# Patient Record
Sex: FEMALE | Race: WHITE | NOT HISPANIC OR LATINO | Employment: OTHER | URBAN - METROPOLITAN AREA
[De-identification: names, ages, dates, MRNs, and addresses within clinical notes are randomized per-mention and may not be internally consistent; named-entity substitution may affect disease eponyms.]

---

## 2017-10-02 ENCOUNTER — TRANSFERRED RECORDS (OUTPATIENT)
Dept: HEALTH INFORMATION MANAGEMENT | Facility: CLINIC | Age: 67
End: 2017-10-02

## 2017-10-05 ENCOUNTER — TRANSFERRED RECORDS (OUTPATIENT)
Dept: HEALTH INFORMATION MANAGEMENT | Facility: CLINIC | Age: 67
End: 2017-10-05

## 2017-10-11 RX ORDER — CLINDAMYCIN HCL 300 MG
600 CAPSULE ORAL DAILY PRN
COMMUNITY
End: 2022-05-19

## 2017-10-11 RX ORDER — LORAZEPAM 1 MG/1
1 TABLET ORAL AT BEDTIME
Status: ON HOLD | COMMUNITY
End: 2019-08-10

## 2017-10-13 ENCOUNTER — TRANSFERRED RECORDS (OUTPATIENT)
Dept: HEALTH INFORMATION MANAGEMENT | Facility: CLINIC | Age: 67
End: 2017-10-13

## 2017-10-15 ENCOUNTER — ANESTHESIA EVENT (OUTPATIENT)
Dept: SURGERY | Facility: CLINIC | Age: 67
End: 2017-10-15
Payer: MEDICARE

## 2017-10-16 ENCOUNTER — APPOINTMENT (OUTPATIENT)
Dept: GENERAL RADIOLOGY | Facility: CLINIC | Age: 67
End: 2017-10-16
Attending: ORTHOPAEDIC SURGERY
Payer: MEDICARE

## 2017-10-16 ENCOUNTER — HOSPITAL ENCOUNTER (OUTPATIENT)
Facility: CLINIC | Age: 67
Discharge: HOME | End: 2017-10-18
Attending: ORTHOPAEDIC SURGERY | Admitting: ORTHOPAEDIC SURGERY
Payer: MEDICARE

## 2017-10-16 ENCOUNTER — ANESTHESIA (OUTPATIENT)
Dept: SURGERY | Facility: CLINIC | Age: 67
End: 2017-10-16
Payer: MEDICARE

## 2017-10-16 DIAGNOSIS — Z98.890 S/P LAMINECTOMY: Primary | ICD-10-CM

## 2017-10-16 PROBLEM — M48.00 SPINAL STENOSIS: Status: ACTIVE | Noted: 2017-10-16

## 2017-10-16 LAB — HGB BLD-MCNC: 14.4 G/DL (ref 11.7–15.7)

## 2017-10-16 PROCEDURE — 25000128 H RX IP 250 OP 636: Performed by: ANESTHESIOLOGY

## 2017-10-16 PROCEDURE — 25000128 H RX IP 250 OP 636: Performed by: NURSE ANESTHETIST, CERTIFIED REGISTERED

## 2017-10-16 PROCEDURE — 25000125 ZZHC RX 250: Performed by: NURSE ANESTHETIST, CERTIFIED REGISTERED

## 2017-10-16 PROCEDURE — 36415 COLL VENOUS BLD VENIPUNCTURE: CPT | Performed by: ANESTHESIOLOGY

## 2017-10-16 PROCEDURE — 37000008 ZZH ANESTHESIA TECHNICAL FEE, 1ST 30 MIN: Performed by: ORTHOPAEDIC SURGERY

## 2017-10-16 PROCEDURE — 27210794 ZZH OR GENERAL SUPPLY STERILE: Performed by: ORTHOPAEDIC SURGERY

## 2017-10-16 PROCEDURE — 25000128 H RX IP 250 OP 636: Performed by: PHYSICIAN ASSISTANT

## 2017-10-16 PROCEDURE — 36000063 ZZH SURGERY LEVEL 4 EA 15 ADDTL MIN: Performed by: ORTHOPAEDIC SURGERY

## 2017-10-16 PROCEDURE — 85018 HEMOGLOBIN: CPT | Performed by: ANESTHESIOLOGY

## 2017-10-16 PROCEDURE — 40000277 XR SURGERY CARM FLUORO LESS THAN 5 MIN W STILLS

## 2017-10-16 PROCEDURE — 37000009 ZZH ANESTHESIA TECHNICAL FEE, EACH ADDTL 15 MIN: Performed by: ORTHOPAEDIC SURGERY

## 2017-10-16 PROCEDURE — 25000125 ZZHC RX 250: Performed by: ORTHOPAEDIC SURGERY

## 2017-10-16 PROCEDURE — 25000566 ZZH SEVOFLURANE, EA 15 MIN: Performed by: ORTHOPAEDIC SURGERY

## 2017-10-16 PROCEDURE — A9270 NON-COVERED ITEM OR SERVICE: HCPCS | Mod: GY | Performed by: PHYSICIAN ASSISTANT

## 2017-10-16 PROCEDURE — 27210995 ZZH RX 272: Performed by: ORTHOPAEDIC SURGERY

## 2017-10-16 PROCEDURE — 36000065 ZZH SURGERY LEVEL 4 W FLUORO 1ST 30 MIN: Performed by: ORTHOPAEDIC SURGERY

## 2017-10-16 PROCEDURE — 25000128 H RX IP 250 OP 636: Performed by: ORTHOPAEDIC SURGERY

## 2017-10-16 PROCEDURE — 25000132 ZZH RX MED GY IP 250 OP 250 PS 637: Mod: GY | Performed by: PHYSICIAN ASSISTANT

## 2017-10-16 PROCEDURE — 71000012 ZZH RECOVERY PHASE 1 LEVEL 1 FIRST HR: Performed by: ORTHOPAEDIC SURGERY

## 2017-10-16 PROCEDURE — 40000170 ZZH STATISTIC PRE-PROCEDURE ASSESSMENT II: Performed by: ORTHOPAEDIC SURGERY

## 2017-10-16 RX ORDER — OXYCODONE HYDROCHLORIDE 5 MG/1
5-10 TABLET ORAL
Status: DISCONTINUED | OUTPATIENT
Start: 2017-10-16 | End: 2017-10-18 | Stop reason: HOSPADM

## 2017-10-16 RX ORDER — ONDANSETRON 4 MG/1
4 TABLET, ORALLY DISINTEGRATING ORAL EVERY 6 HOURS PRN
Status: DISCONTINUED | OUTPATIENT
Start: 2017-10-16 | End: 2017-10-18 | Stop reason: HOSPADM

## 2017-10-16 RX ORDER — LORAZEPAM 1 MG/1
1 TABLET ORAL AT BEDTIME
Status: DISCONTINUED | OUTPATIENT
Start: 2017-10-16 | End: 2017-10-18 | Stop reason: HOSPADM

## 2017-10-16 RX ORDER — ALBUTEROL SULFATE 0.83 MG/ML
2.5 SOLUTION RESPIRATORY (INHALATION) EVERY 4 HOURS PRN
Status: DISCONTINUED | OUTPATIENT
Start: 2017-10-16 | End: 2017-10-16 | Stop reason: HOSPADM

## 2017-10-16 RX ORDER — MEPERIDINE HYDROCHLORIDE 25 MG/ML
12.5 INJECTION INTRAMUSCULAR; INTRAVENOUS; SUBCUTANEOUS EVERY 5 MIN PRN
Status: DISCONTINUED | OUTPATIENT
Start: 2017-10-16 | End: 2017-10-16 | Stop reason: HOSPADM

## 2017-10-16 RX ORDER — ONDANSETRON 2 MG/ML
4 INJECTION INTRAMUSCULAR; INTRAVENOUS EVERY 6 HOURS PRN
Status: DISCONTINUED | OUTPATIENT
Start: 2017-10-16 | End: 2017-10-18 | Stop reason: HOSPADM

## 2017-10-16 RX ORDER — SODIUM CHLORIDE 9 MG/ML
INJECTION, SOLUTION INTRAVENOUS CONTINUOUS
Status: DISCONTINUED | OUTPATIENT
Start: 2017-10-16 | End: 2017-10-17 | Stop reason: CLARIF

## 2017-10-16 RX ORDER — CLINDAMYCIN PHOSPHATE 900 MG/50ML
900 INJECTION, SOLUTION INTRAVENOUS
Status: DISCONTINUED | OUTPATIENT
Start: 2017-10-16 | End: 2017-10-16 | Stop reason: HOSPADM

## 2017-10-16 RX ORDER — SODIUM CHLORIDE, SODIUM LACTATE, POTASSIUM CHLORIDE, CALCIUM CHLORIDE 600; 310; 30; 20 MG/100ML; MG/100ML; MG/100ML; MG/100ML
INJECTION, SOLUTION INTRAVENOUS CONTINUOUS PRN
Status: DISCONTINUED | OUTPATIENT
Start: 2017-10-16 | End: 2017-10-16

## 2017-10-16 RX ORDER — NEOSTIGMINE METHYLSULFATE 1 MG/ML
VIAL (ML) INJECTION PRN
Status: DISCONTINUED | OUTPATIENT
Start: 2017-10-16 | End: 2017-10-16

## 2017-10-16 RX ORDER — SODIUM CHLORIDE, SODIUM LACTATE, POTASSIUM CHLORIDE, CALCIUM CHLORIDE 600; 310; 30; 20 MG/100ML; MG/100ML; MG/100ML; MG/100ML
INJECTION, SOLUTION INTRAVENOUS CONTINUOUS
Status: DISCONTINUED | OUTPATIENT
Start: 2017-10-16 | End: 2017-10-16 | Stop reason: HOSPADM

## 2017-10-16 RX ORDER — HYDROMORPHONE HYDROCHLORIDE 1 MG/ML
.3-.5 INJECTION, SOLUTION INTRAMUSCULAR; INTRAVENOUS; SUBCUTANEOUS EVERY 30 MIN PRN
Status: DISCONTINUED | OUTPATIENT
Start: 2017-10-16 | End: 2017-10-18 | Stop reason: HOSPADM

## 2017-10-16 RX ORDER — GLYCOPYRROLATE 0.2 MG/ML
INJECTION, SOLUTION INTRAMUSCULAR; INTRAVENOUS PRN
Status: DISCONTINUED | OUTPATIENT
Start: 2017-10-16 | End: 2017-10-16

## 2017-10-16 RX ORDER — ACETAMINOPHEN 325 MG/1
650 TABLET ORAL EVERY 6 HOURS PRN
Status: DISCONTINUED | OUTPATIENT
Start: 2017-10-16 | End: 2017-10-18 | Stop reason: HOSPADM

## 2017-10-16 RX ORDER — HYDROMORPHONE HYDROCHLORIDE 1 MG/ML
.3-.5 INJECTION, SOLUTION INTRAMUSCULAR; INTRAVENOUS; SUBCUTANEOUS EVERY 5 MIN PRN
Status: DISCONTINUED | OUTPATIENT
Start: 2017-10-16 | End: 2017-10-16 | Stop reason: HOSPADM

## 2017-10-16 RX ORDER — CEFAZOLIN SODIUM 1 G/3ML
1 INJECTION, POWDER, FOR SOLUTION INTRAMUSCULAR; INTRAVENOUS SEE ADMIN INSTRUCTIONS
Status: DISCONTINUED | OUTPATIENT
Start: 2017-10-16 | End: 2017-10-16 | Stop reason: HOSPADM

## 2017-10-16 RX ORDER — CLINDAMYCIN PHOSPHATE 900 MG/50ML
900 INJECTION, SOLUTION INTRAVENOUS SEE ADMIN INSTRUCTIONS
Status: DISCONTINUED | OUTPATIENT
Start: 2017-10-16 | End: 2017-10-16 | Stop reason: HOSPADM

## 2017-10-16 RX ORDER — NALOXONE HYDROCHLORIDE 0.4 MG/ML
.1-.4 INJECTION, SOLUTION INTRAMUSCULAR; INTRAVENOUS; SUBCUTANEOUS
Status: DISCONTINUED | OUTPATIENT
Start: 2017-10-16 | End: 2017-10-18 | Stop reason: HOSPADM

## 2017-10-16 RX ORDER — FENTANYL CITRATE 50 UG/ML
25-50 INJECTION, SOLUTION INTRAMUSCULAR; INTRAVENOUS
Status: DISCONTINUED | OUTPATIENT
Start: 2017-10-16 | End: 2017-10-16 | Stop reason: HOSPADM

## 2017-10-16 RX ORDER — EPHEDRINE SULFATE 50 MG/ML
INJECTION, SOLUTION INTRAVENOUS PRN
Status: DISCONTINUED | OUTPATIENT
Start: 2017-10-16 | End: 2017-10-16

## 2017-10-16 RX ORDER — DEXAMETHASONE SODIUM PHOSPHATE 4 MG/ML
INJECTION, SOLUTION INTRA-ARTICULAR; INTRALESIONAL; INTRAMUSCULAR; INTRAVENOUS; SOFT TISSUE PRN
Status: DISCONTINUED | OUTPATIENT
Start: 2017-10-16 | End: 2017-10-16

## 2017-10-16 RX ORDER — PROPOFOL 10 MG/ML
INJECTION, EMULSION INTRAVENOUS PRN
Status: DISCONTINUED | OUTPATIENT
Start: 2017-10-16 | End: 2017-10-16

## 2017-10-16 RX ORDER — LABETALOL HYDROCHLORIDE 5 MG/ML
INJECTION, SOLUTION INTRAVENOUS PRN
Status: DISCONTINUED | OUTPATIENT
Start: 2017-10-16 | End: 2017-10-16

## 2017-10-16 RX ORDER — CEFAZOLIN SODIUM 1 G/3ML
1 INJECTION, POWDER, FOR SOLUTION INTRAMUSCULAR; INTRAVENOUS EVERY 8 HOURS
Status: DISCONTINUED | OUTPATIENT
Start: 2017-10-16 | End: 2017-10-17

## 2017-10-16 RX ORDER — LIDOCAINE HYDROCHLORIDE 20 MG/ML
INJECTION, SOLUTION INFILTRATION; PERINEURAL PRN
Status: DISCONTINUED | OUTPATIENT
Start: 2017-10-16 | End: 2017-10-16

## 2017-10-16 RX ORDER — ONDANSETRON 2 MG/ML
INJECTION INTRAMUSCULAR; INTRAVENOUS PRN
Status: DISCONTINUED | OUTPATIENT
Start: 2017-10-16 | End: 2017-10-16

## 2017-10-16 RX ORDER — CEFAZOLIN SODIUM 2 G/100ML
2 INJECTION, SOLUTION INTRAVENOUS
Status: COMPLETED | OUTPATIENT
Start: 2017-10-16 | End: 2017-10-16

## 2017-10-16 RX ORDER — ONDANSETRON 2 MG/ML
4 INJECTION INTRAMUSCULAR; INTRAVENOUS EVERY 30 MIN PRN
Status: DISCONTINUED | OUTPATIENT
Start: 2017-10-16 | End: 2017-10-16 | Stop reason: HOSPADM

## 2017-10-16 RX ORDER — FENTANYL CITRATE 50 UG/ML
INJECTION, SOLUTION INTRAMUSCULAR; INTRAVENOUS PRN
Status: DISCONTINUED | OUTPATIENT
Start: 2017-10-16 | End: 2017-10-16

## 2017-10-16 RX ORDER — ONDANSETRON 4 MG/1
4 TABLET, ORALLY DISINTEGRATING ORAL EVERY 30 MIN PRN
Status: DISCONTINUED | OUTPATIENT
Start: 2017-10-16 | End: 2017-10-16 | Stop reason: HOSPADM

## 2017-10-16 RX ADMIN — ONDANSETRON 4 MG: 2 INJECTION INTRAMUSCULAR; INTRAVENOUS at 14:30

## 2017-10-16 RX ADMIN — DEXAMETHASONE SODIUM PHOSPHATE 4 MG: 4 INJECTION, SOLUTION INTRA-ARTICULAR; INTRALESIONAL; INTRAMUSCULAR; INTRAVENOUS; SOFT TISSUE at 13:30

## 2017-10-16 RX ADMIN — SODIUM CHLORIDE: 9 INJECTION, SOLUTION INTRAVENOUS at 17:52

## 2017-10-16 RX ADMIN — LABETALOL HYDROCHLORIDE 10 MG: 5 INJECTION, SOLUTION INTRAVENOUS at 14:54

## 2017-10-16 RX ADMIN — LIDOCAINE HYDROCHLORIDE 40 MG: 20 INJECTION, SOLUTION INFILTRATION; PERINEURAL at 13:01

## 2017-10-16 RX ADMIN — PHENYLEPHRINE HYDROCHLORIDE 50 MCG: 10 INJECTION, SOLUTION INTRAMUSCULAR; INTRAVENOUS; SUBCUTANEOUS at 13:48

## 2017-10-16 RX ADMIN — GLYCOPYRROLATE 0.6 MG: 0.2 INJECTION, SOLUTION INTRAMUSCULAR; INTRAVENOUS at 14:27

## 2017-10-16 RX ADMIN — MIDAZOLAM HYDROCHLORIDE 2 MG: 1 INJECTION, SOLUTION INTRAMUSCULAR; INTRAVENOUS at 12:58

## 2017-10-16 RX ADMIN — HYDROMORPHONE HYDROCHLORIDE 0.5 MG: 1 INJECTION, SOLUTION INTRAMUSCULAR; INTRAVENOUS; SUBCUTANEOUS at 21:58

## 2017-10-16 RX ADMIN — FENTANYL CITRATE 50 MCG: 50 INJECTION, SOLUTION INTRAMUSCULAR; INTRAVENOUS at 14:30

## 2017-10-16 RX ADMIN — PHENYLEPHRINE HYDROCHLORIDE 50 MCG: 10 INJECTION, SOLUTION INTRAMUSCULAR; INTRAVENOUS; SUBCUTANEOUS at 13:39

## 2017-10-16 RX ADMIN — PHENYLEPHRINE HYDROCHLORIDE 0.2 MCG/KG/MIN: 10 INJECTION, SOLUTION INTRAMUSCULAR; INTRAVENOUS; SUBCUTANEOUS at 14:02

## 2017-10-16 RX ADMIN — SODIUM CHLORIDE, POTASSIUM CHLORIDE, SODIUM LACTATE AND CALCIUM CHLORIDE: 600; 310; 30; 20 INJECTION, SOLUTION INTRAVENOUS at 13:30

## 2017-10-16 RX ADMIN — PROPOFOL 200 MG: 10 INJECTION, EMULSION INTRAVENOUS at 13:01

## 2017-10-16 RX ADMIN — NEOSTIGMINE METHYLSULFATE 3 MG: 1 INJECTION INTRAMUSCULAR; INTRAVENOUS; SUBCUTANEOUS at 14:28

## 2017-10-16 RX ADMIN — LORAZEPAM 1 MG: 1 TABLET ORAL at 22:50

## 2017-10-16 RX ADMIN — FENTANYL CITRATE 50 MCG: 50 INJECTION, SOLUTION INTRAMUSCULAR; INTRAVENOUS at 13:01

## 2017-10-16 RX ADMIN — HYDROMORPHONE HYDROCHLORIDE 0.5 MG: 1 INJECTION, SOLUTION INTRAMUSCULAR; INTRAVENOUS; SUBCUTANEOUS at 16:27

## 2017-10-16 RX ADMIN — EPHEDRINE SULFATE 5 MG: 50 INJECTION, SOLUTION INTRAVENOUS at 13:10

## 2017-10-16 RX ADMIN — ROCURONIUM BROMIDE 50 MG: 10 INJECTION INTRAVENOUS at 13:01

## 2017-10-16 RX ADMIN — EPHEDRINE SULFATE 5 MG: 50 INJECTION, SOLUTION INTRAVENOUS at 13:39

## 2017-10-16 RX ADMIN — CEFAZOLIN SODIUM 2 G: 2 INJECTION, SOLUTION INTRAVENOUS at 13:11

## 2017-10-16 RX ADMIN — EPHEDRINE SULFATE 5 MG: 50 INJECTION, SOLUTION INTRAVENOUS at 13:48

## 2017-10-16 RX ADMIN — HYDROMORPHONE HYDROCHLORIDE 0.5 MG: 1 INJECTION, SOLUTION INTRAMUSCULAR; INTRAVENOUS; SUBCUTANEOUS at 15:29

## 2017-10-16 RX ADMIN — EPHEDRINE SULFATE 5 MG: 50 INJECTION, SOLUTION INTRAVENOUS at 13:15

## 2017-10-16 RX ADMIN — SODIUM CHLORIDE, POTASSIUM CHLORIDE, SODIUM LACTATE AND CALCIUM CHLORIDE: 600; 310; 30; 20 INJECTION, SOLUTION INTRAVENOUS at 12:57

## 2017-10-16 RX ADMIN — CEFAZOLIN SODIUM 1 G: 1 INJECTION, POWDER, FOR SOLUTION INTRAMUSCULAR; INTRAVENOUS at 21:58

## 2017-10-16 ASSESSMENT — LIFESTYLE VARIABLES: TOBACCO_USE: 1
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
## 2017-10-16 NOTE — IP AVS SNAPSHOT
[address] [facility]    [facility] [address]    [place] [address]    Phone:  [phone]                                       After Visit Summary   10/16/2017    Leonor Bernstein    MRN: [id number]           After Visit Summary Signature Page     I have received my discharge instructions, and my questions have been answered. I have discussed any challenges I see with this plan with the nurse or doctor.    ..........................................................................................................................................  Patient/Patient Representative Signature      ..........................................................................................................................................  Patient Representative Print Name and Relationship to Patient    ..................................................               ................................................  Date                                            Time    ..........................................................................................................................................  Reviewed by Signature/Title    ...................................................              ..............................................  Date                                                            Time

## 2017-10-16 NOTE — PROGRESS NOTES
Admission medication history interview status for the 10/16/2017  admission is complete. See EPIC admission navigator for prior to admission medications     Medication history source reliability:Good    Medication history interview source(s):Patient    Medication history resources (including written lists, pill bottles, clinic record):None    Primary pharmacy.[facility]    Additional medication history information not noted on PTA med list :None    Time spent in this activity: 45 minutes    Prior to Admission medications    Medication Sig Last Dose Taking? Auth Provider   FLUOXETINE HCL PO Take 60 mg by mouth daily (Takes 3 x 20mg tablet = 60mg dose) 10/16/2017 at 0700 Yes Reported, Patient   LORAZEPAM PO Take 1 mg by mouth At Bedtime 10/15/2017 at pm Yes Reported, Patient   Ferrous Sulfate (IRON SUPPLEMENT PO) Take 325 mg by mouth 2 times daily 10/15/2017 at pm Yes Reported, Patient   Naproxen Sod-Diphenhydramine (ALEVE PM PO) Take 2 tablets by mouth At Bedtime  more than a week at hs Yes Reported, Patient   CLINDAMYCIN HCL PO Take 600 mg by mouth daily as needed (1 hour before dental procedure) (Takes 2 x 300mg tablet = 600mg dose) more than month at prn Yes Reported, Patient   ergocalciferol (ERGOCALCIFEROL) 13783 UNIT capsule Take 50,000 Units by mouth Every Mon, Wed, Fri Morning  10/13/2017 at am Yes Reported, Patient   Cyanocobalamin (VITAMIN B-12 IJ) Inject  as directed every 21 days. 10/2/2017 Yes Reported, Patient

## 2017-10-16 NOTE — ANESTHESIA CARE TRANSFER NOTE
Patient: Leonor A Destinylney    Procedure(s):  BILATERAL L2-3 DECOMPRESSION  - Wound Class: I-Clean    Diagnosis: CENTRAL STENOSIS L2-3 WITH RIGHT LEG RADICULOPATHY AND SENSOR AND MOTOR DEFICIET   Diagnosis Additional Information: No value filed.    Anesthesia Type:   General, ETT     Note:  Airway :Face Mask  Patient transferred to:PACU  Comments: Patient awake, eyes open, follows commands, spontaneous respirations with tidal volumes 300-500cc.  Vital signs stable. Dr. Morton present. Patient suctioned and extubated. Dentition as preop. Patient transported to recovery with oxygen. Report given, care transferred,  and questions answered.  Vitals in PACU:  /77  HR 72  RR 18  Sats 97%Handoff Report: Identifed the Patient, Identified the Reponsible Provider, Reviewed the pertinent medical history, Discussed the surgical course, Reviewed Intra-OP anesthesia mangement and issues during anesthesia, Set expectations for post-procedure period and Allowed opportunity for questions and acknowledgement of understanding      Vitals: (Last set prior to Anesthesia Care Transfer)    CRNA VITALS  10/16/2017 1425 - 10/16/2017 1501      10/16/2017             Resp Rate (set): 10                Electronically Signed By: JACQUE Askew CRNA  October 16, 2017  3:01 PM

## 2017-10-16 NOTE — IP AVS SNAPSHOT
MRN:[id number]                      After Visit Summary   10/16/2017    Leonor Bernstein    MRN: [id number]           Thank you!     Thank you for choosing [facility] for your care. Our goal is always to provide you with excellent care. Hearing back from our patients is one way we can continue to improve our services. Please take a few minutes to complete the written survey that you may receive in the mail after you visit with us. Thank you!        Patient Information     Date Of Birth          1950        Designated Caregiver       Most Recent Value    Caregiver    Will someone help with your care after discharge? yes    Name of designated caregiver Vincent [friend ]    Phone number of caregiver [phone]    Caregiver address [place], [place]      About your hospital stay     You were admitted on:  October 16, 2017 You last received care in the:  [facility] [address] [facility]    You were discharged on:  October 18, 2017       Who to Call     For medical emergencies, please call 911.  For non-urgent questions about your medical care, please call your primary care provider or clinic, [phone]  For questions related to your surgery, please call your surgery clinic        Attending Provider     Provider Andrea Rosales MD Orthopedics       Primary Care Provider Office Phone # Fax #    Mini Hayden [phone]      After Care Instructions     Activity       Your activity upon discharge: follow Dr. Coelho d/c instruction sheet            Discharge Instructions           Supplies       List the supplies the pt needs to go home  4x4s and tape                  Follow-up Appointments     Follow-up and recommended labs and tests        patient has a follow-up appointment to see me, given a discharge instruction sheet and dressing supplies.                  Further instructions from your care team                 Care after a Discectomy/Decompression -  Dr. Andrea Coelho    The following information will help you through your recovery at home.  Pain    It is normal for you to experience some pain in the area of your incision after your surgery.  Some of your leg pain may go away immediately after your surgery.  Some of the pain will gradually decrease over the next few days or weeks depending on the amount of inflammation present in the nerve.      Sometimes Dr. Coelho will place a steroid preparation on the nerve during surgery.   This may help decrease the nerve inflammation for the first few days after surgery.  If some of your leg pain returns 3 to 5 days after surgery it may be due to the steroid wearing off.  The pain should not be as bad as your pre-op pain and will diminish over a period of weeks.      You should call Dr. Coelho s office if your leg pain returns suddenly and does not improve over 24 hours.    Activity    You may increase your activity as tolerated; walking is the best form of exercise after spine surgery.      Avoid bending, lifting, and twisting (BLT).  Avoid activities such as vacuuming, raking and shoveling.    Try to limit your lifting to 10-15lbs during the first 2 weeks and then increase to 20-25lbs over the next 6 weeks.    You may return to work approximately 1- 2 weeks after your surgery, if you have a sedentary or desk type job.  If you have a physical job Dr. Coelho and his staff will help you determine a return to work plan.      You may resume sexual activity when you feel ready.  Stop if you have pain.    Driving    You may drive if you feel strong enough and are not taking any narcotic pain medications.    Incision Site     The first 3 days after surgery Dr. Coelho would like you to keep your incision dry.   You may take a sponge bath during this time or cover your dressing with a transparent material called Tegaderm (sold at most pharmacies).      The first 3 days after surgery you should change your gauze dressing at least once a  day.  After 3 days you may remove the gauze dressing and leave it off, at this point you may shower without covering your incision, allow water and soap to run over your incision but do not scrub the area or soak in a tub.    Your incision is covered with steri-strips (narrow white tapes); they will get loose and fall of in 7-10 days.  If they do not fall off after 10 days you may remove them or Dr. Laquita dyer staff will remove them at your post-op visit.    Most patients have dissolvable stitches which do not need to be removed.  In some cases nylon stitches are used and they need to be removed, 10-14 days after surgery.  If you have staples or nylon sutures please call for a removal appointment with Dr. Laquita dyer nurse.    Pain Management     Take your prescribed pain medication as needed and directed.  Use Tylenol and Ibuprofen for your discomfort when you no longer need the narcotic pain medication.      If you need a refill on your pain medication call [phone], please allow 24 hours for your prescription to be refilled, Dr. Laquita dyer office does not refill pain medications on Friday.   Diet     Eat a healthy diet; this will help your recovery.    Drink plenty of fluids, water, milk or juice.    If you have trouble with constipation you should eat more fiber, drink more fluids, increase your walking or try an over the counter laxative.    Follow-up Visits    You should have a post-op appointment that was scheduled for you at the same time your surgery was scheduled. If you do not, please call Dr. Laquita dyer office when you get home from your surgery.    Write down any questions you have about your surgery, recovery, return to work and other topics you wished to be covered at your post-op visit.  This way, we will be able to address all of your questions at your next visit.    Call Dr. Laquita dyer office if you have any questions or concerns.    When to Call your Doctor:    If you have any redness, warmth or swelling at  "the incision site.    If your incision opens up.    If you have increasing drainage from your incision.    If you have a temperature greater than 100.5 degrees Fahrenheit.    [facility] [address] [address]  Phone: [phone]  Fax: [phone]  Web site: [email or web address]    Pending Results     No orders found from 10/14/2017 to 10/17/2017.            Statement of Approval     Ordered          10/18/17 0734  I have reviewed and agree with all the recommendations and orders detailed in this document.  EFFECTIVE NOW     Approved and electronically signed by:  Mandi Stevens PA-C           10/17/17 0736  I have reviewed and agree with all the recommendations and orders detailed in this document.  EFFECTIVE NOW     Approved and electronically signed by:  Andrea Coelho MD             Admission Information     Date & Time Provider Department Dept. Phone    10/16/2017 Andrea Coelho MD [address] [facility] [phone]      Your Vitals Were     Blood Pressure Temperature Respirations Height Weight Pulse Oximetry    130/63 (BP Location: Right arm) 98.3  F (36.8  C) (Oral) 16 1.626 m (5' 4\") 100.2 kg (221 lb) 92%    BMI (Body Mass Index)                   37.93 kg/m2           MyChart Information     [employer] lets you send messages to your doctor, view your test results, renew your prescriptions, schedule appointments and more. To sign up, go to [email or web address] . Click on \"Log in\" on the left side of the screen, which will take you to the Welcome page. Then click on \"Sign up Now\" on the right side of the page.     You will be asked to enter the access code listed below, as well as some personal information. Please follow the directions to create your username and password.     Your access code is: [email or web address]  Expires: 1/15/2018 12:44 PM     Your access code will  in 90 days. If you need help or a new code, please call your [facility] clinic or [phone].        Care " EveryWhere ID     This is your Care EveryWhere ID. This could be used by other organizations to access your [facility] medical records  [id number]        Equal Access to Services     ENDY ROGEL : Jian Bansal, stalin mohamud, prisca thurmanmalorin wilson, shama raphaeljosejayne blas. So [facility] [phone].    ATENCIÓN: Si habla español, tiene a mensah disposición servicios gratuitos de asistencia lingüística. Llame al [phone].    We comply with applicable federal civil rights laws and [place] laws. We do not discriminate on the basis of race, color, national origin, age, disability, sex, sexual orientation, or gender identity.               Review of your medicines      START taking        Dose / Directions    oxyCODONE 5 MG IR tablet   Commonly known as:  ROXICODONE   Used for:  S/P laminectomy        Dose:  5-10 mg   Take 1-2 tablets (5-10 mg) by mouth every 3 hours as needed for moderate to severe pain   Quantity:  25 tablet   Refills:  0         CONTINUE these medicines which have NOT CHANGED        Dose / Directions    ALEVE PM PO        Dose:  2 tablet   Take 2 tablets by mouth At Bedtime   Refills:  0       CLINDAMYCIN HCL PO        Dose:  600 mg   Take 600 mg by mouth daily as needed (1 hour before dental procedure) (Takes 2 x 300mg tablet = 600mg dose)   Refills:  0       ergocalciferol 42232 UNITS capsule   Commonly known as:  ERGOCALCIFEROL        Dose:  24029 Units   Take 50,000 Units by mouth Every Mon, Wed, Fri Morning   Refills:  0       FLUOXETINE HCL PO        Dose:  60 mg   Take 60 mg by mouth daily (Takes 3 x 20mg tablet = 60mg dose)   Refills:  0       IRON SUPPLEMENT PO        Dose:  325 mg   Take 325 mg by mouth 2 times daily   Refills:  0       LORAZEPAM PO        Dose:  1 mg   Take 1 mg by mouth At Bedtime   Refills:  0       VITAMIN B-12 IJ        Inject  as directed every 21 days.   Refills:  0            Where to get your medicines      Some of these will  need a paper prescription and others can be bought over the counter. Ask your nurse if you have questions.     Bring a paper prescription for each of these medications     oxyCODONE 5 MG IR tablet               ANTIBIOTIC INSTRUCTION     You've Been Prescribed an Antibiotic - Now What?  Your healthcare team thinks that you or your loved one might have an infection. Some infections can be treated with antibiotics, which are powerful, life-saving drugs. Like all medications, antibiotics have side effects and should only be used when necessary. There are some important things you should know about your antibiotic treatment.      Your healthcare team may run tests before you start taking an antibiotic.    Your team may take samples (e.g., from your blood, urine or other areas) to run tests to look for bacteria. These test can be important to determine if you need an antibiotic at all and, if you do, which antibiotic will work best.      Within a few days, your healthcare team might change or even stop your antibiotic.    Your team may start you on an antibiotic while they are working to find out what is making you sick.    Your team might change your antibiotic because test results show that a different antibiotic would be better to treat your infection.    In some cases, once your team has more information, they learn that you do not need an antibiotic at all. They may find out that you don't have an infection, or that the antibiotic you're taking won't work against your infection. For example, an infection caused by a virus can't be treated with antibiotics. Staying on an antibiotic when you don't need it is more likely to be harmful than helpful.      You may experience side effects from your antibiotic.    Like all medications, antibiotics have side effects. Some of these can be serious.    Let you healthcare team know if you have any known allergies when you are admitted to the hospital.    One significant side  effect of nearly all antibiotics is the risk of severe and sometimes deadly diarrhea caused by Clostridium difficile (C. Difficile). This occurs when a person takes antibiotics because some good germs are destroyed. Antibiotic use allows C. diificile to take over, putting patients at high risk for this serious infection.    As a patient or caregiver, it is important to understand your or your loved one's antibiotic treatment. It is especially important for caregivers to speak up when patients can't speak for themselves. Here are some important questions to ask your healthcare team.    What infection is this antibiotic treating and how do you know I have that infection?    What side effects might occur from this antibiotic?    How long will I need to take this antibiotic?    Is it safe to take this antibiotic with other medications or supplements (e.g., vitamins) that I am taking?     Are there any special directions I need to know about taking this antibiotic? For example, should I take it with food?    How will I be monitored to know whether my infection is responding to the antibiotic?    What tests may help to make sure the right antibiotic is prescribed for me?      Information provided by:  www.cdc.gov/getsmart  U.S. Department of Health and Human Services  Centers for disease Control and Prevention  National Center for Emerging and Zoonotic Infectious Diseases  Division of Healthcare Quality Promotion         Protect others around you: Learn how to safely use, store and throw away your medicines at www.disposemymeds.org.             Medication List: This is a list of all your medications and when to take them. Check marks below indicate your daily home schedule. Keep this list as a reference.      Medications           Morning Afternoon Evening Bedtime As Needed    ALEVE PM PO   Take 2 tablets by mouth At Bedtime   Next Dose Due:  Resume home schedule                                   CLINDAMYCIN HCL PO   Take  600 mg by mouth daily as needed (1 hour before dental procedure) (Takes 2 x 300mg tablet = 600mg dose)                                   ergocalciferol 58762 UNITS capsule   Commonly known as:  ERGOCALCIFEROL   Take 50,000 Units by mouth Every Mon, Wed, Fri Morning   Next Dose Due:  Resume home schedule                                FLUOXETINE HCL PO   Take 60 mg by mouth daily (Takes 3 x 20mg tablet = 60mg dose)   Last time this was given:  60 mg on 10/18/2017 10:42 AM   Next Dose Due:  Tomorrow AM                                   IRON SUPPLEMENT PO   Take 325 mg by mouth 2 times daily   Next Dose Due:  Resume home schedule                                      LORAZEPAM PO   Take 1 mg by mouth At Bedtime   Last time this was given:  1 mg on 10/16/2017 10:50 PM   Next Dose Due:  Tonight at bedtime                                   oxyCODONE 5 MG IR tablet   Commonly known as:  ROXICODONE   Take 1-2 tablets (5-10 mg) by mouth every 3 hours as needed for moderate to severe pain   Last time this was given:  10 mg on 10/18/2017 10:43 AM                                   VITAMIN B-12 IJ   Inject  as directed every 21 days.   Next Dose Due:  Resume home schedule

## 2017-10-16 NOTE — ANESTHESIA POSTPROCEDURE EVALUATION
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    Anesthesia Type:  General, ETT    Note:  Anesthesia Post Evaluation    Patient location during evaluation: PACU  Patient participation: Able to fully participate in evaluation  Level of consciousness: awake and alert  Pain management: satisfactory to patient  Airway patency: patent  Cardiovascular status: hemodynamically stable  Respiratory status: acceptable and unassisted  Hydration status: balanced  PONV: none     Anesthetic complications: None          Last vitals:  Vitals:    10/16/17 1529 10/16/17 1530 10/16/17 1550   BP:  142/69 144/75   Resp: 20 20 12   Temp:   36.9  C (98.5  F)   SpO2:  99% 98%         Electronically Signed By: Otis Morton MD  October 16, 2017  4:10 PM

## 2017-10-17 PROCEDURE — A9270 NON-COVERED ITEM OR SERVICE: HCPCS | Mod: GY | Performed by: PHYSICIAN ASSISTANT

## 2017-10-17 PROCEDURE — 25000132 ZZH RX MED GY IP 250 OP 250 PS 637: Mod: GY | Performed by: PHYSICIAN ASSISTANT

## 2017-10-17 PROCEDURE — 25000128 H RX IP 250 OP 636: Performed by: PHYSICIAN ASSISTANT

## 2017-10-17 RX ORDER — OXYCODONE HYDROCHLORIDE 5 MG/1
5-10 TABLET ORAL
Qty: 25 TABLET | Refills: 0 | Status: ON HOLD | OUTPATIENT
Start: 2017-10-17 | End: 2019-06-03

## 2017-10-17 RX ADMIN — OXYCODONE HYDROCHLORIDE 10 MG: 5 TABLET ORAL at 10:19

## 2017-10-17 RX ADMIN — OXYCODONE HYDROCHLORIDE 10 MG: 5 TABLET ORAL at 12:57

## 2017-10-17 RX ADMIN — CEFAZOLIN SODIUM 1 G: 1 INJECTION, POWDER, FOR SOLUTION INTRAMUSCULAR; INTRAVENOUS at 13:00

## 2017-10-17 RX ADMIN — FLUOXETINE 60 MG: 20 CAPSULE ORAL at 10:19

## 2017-10-17 RX ADMIN — CEFAZOLIN SODIUM 1 G: 1 INJECTION, POWDER, FOR SOLUTION INTRAMUSCULAR; INTRAVENOUS at 04:16

## 2017-10-17 RX ADMIN — HYDROMORPHONE HYDROCHLORIDE 0.5 MG: 1 INJECTION, SOLUTION INTRAMUSCULAR; INTRAVENOUS; SUBCUTANEOUS at 03:31

## 2017-10-17 RX ADMIN — ONDANSETRON 4 MG: 2 SOLUTION INTRAMUSCULAR; INTRAVENOUS at 08:46

## 2017-10-17 RX ADMIN — HYDROMORPHONE HYDROCHLORIDE 0.5 MG: 1 INJECTION, SOLUTION INTRAMUSCULAR; INTRAVENOUS; SUBCUTANEOUS at 06:06

## 2017-10-17 RX ADMIN — HYDROMORPHONE HYDROCHLORIDE 0.5 MG: 1 INJECTION, SOLUTION INTRAMUSCULAR; INTRAVENOUS; SUBCUTANEOUS at 01:18

## 2017-10-17 RX ADMIN — OXYCODONE HYDROCHLORIDE 10 MG: 5 TABLET ORAL at 20:32

## 2017-10-17 RX ADMIN — OXYCODONE HYDROCHLORIDE 10 MG: 5 TABLET ORAL at 16:16

## 2017-10-17 NOTE — PLAN OF CARE
Problem: Patient Care Overview  Goal: Plan of Care/Patient Progress Review  Outcome: Improving  Patient alert x 4, mild right thigh numbness above knee, incisional pain relieved with oxycodone, patient up in chair for meals and 2 walks in [place] so far , needs 1 more walk, d/cd HMV, plan for discharge tomorrow.

## 2017-10-17 NOTE — PLAN OF CARE
Problem: Patient Care Overview  Goal: Plan of Care/Patient Progress Review  Outcome: No Change  Patient is A&Ox4. POD1 Bilateral L2-3 Decompression. VSS. C/o incisional pain, gave PRN IV dilaudid x3. Back incision dressing CDI. Johnson draining adequately. Tolerating regular diet. Hemovac in place. CMS intact ex slight numbness to right thigh to knee. Will continue to monitor.

## 2017-10-17 NOTE — PLAN OF CARE
Problem: Patient Care Overview  Goal: Plan of Care/Patient Progress Review  Outcome: No Change  19-23: A&O x4, VSS, back pain relieved with prn iv dilaudid, CMS intact except slight numbness on right thigh to knee-reported improving. Dressing CDI with hemovac in place. Encouraged CDB and use of IS. Hypoactive bowel sound. No flatus, good UOP via martinez.

## 2017-10-17 NOTE — PROGRESS NOTES
[facility]    Spine Surgery  Daily Post-Op Note    Assessment & Plan   Procedure(s):  DECOMPRESSION LUMBAR TWO LEVELS   -1 Day Post-Op      ASSESSMENT:  Stable or improving status post L2-3 decompression    PLAN:  Mobilized today, DC Johnson catheter,  DC Hemovac if less than 30 out at noon  Change dressing when Hemovac removed  Ambulate in hallway  Possibly home later this afternoon or tomorrow morning    Andrea Coelho MD      Interval History   Did not sleep wel last night., has incisional pain, right leg feels better than preop.  Still has some numbness in her right anterior thigh.no nausea or vomiting.  Mouth is dry.  Has not been up yet.  Johnson in place.    Physical Exam   Temp: 98.1  F (36.7  C) Temp src: Oral BP: 134/66   Heart Rate: 65 Resp: 16 SpO2: 99 % O2 Device: Nasal cannula Oxygen Delivery: 2 LPM  Vitals:    10/16/17 1034   Weight: 100.2 kg (221 lb)     Vital Signs with Ranges  Temp:  [97.1  F (36.2  C)-98.5  F (36.9  C)] 98.1  F (36.7  C)  Heart Rate:  [65-73] 65  Resp:  [12-24] 16  BP: (123-183)/(64-91) 134/66  SpO2:  [90 %-99 %] 99 %  I/O last 3 completed shifts:  In: 3155 [P.O.:240; I.V.:2915]  Out: 750 [Urine:650; Drains:80; Blood:20]    Alert, Oriented  Abd: S,NT,ND  Wound / Dressing: Clean, dry, and intact  Motor: hip flexors and quads strength is good bilaterally  Sensory: decreased light touch sensation right anterior thigh  Vascular: no edema  Hemovac 30 mL    Medications     NaCl 75 mL/hr at 10/16/17 1752        FLUoxetine (PROzac) capsule 60 mg  60 mg Oral Daily     LORazepam (ATIVAN) tablet 1 mg  1 mg Oral At Bedtime     sodium chloride (PF)  3 mL Intravenous Q8H     ceFAZolin  1 g Intravenous Q8H       Data     Recent Labs  Lab 10/16/17  1047   HGB 14.4       No results found for this or any previous visit (from the past 24 hour(s)).

## 2017-10-18 VITALS
BODY MASS INDEX: 37.73 KG/M2 | TEMPERATURE: 98.3 F | SYSTOLIC BLOOD PRESSURE: 130 MMHG | DIASTOLIC BLOOD PRESSURE: 63 MMHG | OXYGEN SATURATION: 92 % | RESPIRATION RATE: 16 BRPM | WEIGHT: 221 LBS | HEIGHT: 64 IN

## 2017-10-18 PROCEDURE — A9270 NON-COVERED ITEM OR SERVICE: HCPCS | Mod: GY | Performed by: PHYSICIAN ASSISTANT

## 2017-10-18 PROCEDURE — 25000132 ZZH RX MED GY IP 250 OP 250 PS 637: Mod: GY | Performed by: PHYSICIAN ASSISTANT

## 2017-10-18 RX ADMIN — OXYCODONE HYDROCHLORIDE 10 MG: 5 TABLET ORAL at 00:28

## 2017-10-18 RX ADMIN — ACETAMINOPHEN 650 MG: 325 TABLET, FILM COATED ORAL at 04:43

## 2017-10-18 RX ADMIN — FLUOXETINE 60 MG: 20 CAPSULE ORAL at 10:42

## 2017-10-18 RX ADMIN — OXYCODONE HYDROCHLORIDE 10 MG: 5 TABLET ORAL at 10:43

## 2017-10-18 RX ADMIN — OXYCODONE HYDROCHLORIDE 10 MG: 5 TABLET ORAL at 04:43

## 2017-10-18 RX ADMIN — ACETAMINOPHEN 650 MG: 325 TABLET, FILM COATED ORAL at 10:42

## 2017-10-18 NOTE — DISCHARGE INSTRUCTIONS
Care after a Discectomy/Decompression - Dr. Andrea Coelho    The following information will help you through your recovery at home.  Pain    It is normal for you to experience some pain in the area of your incision after your surgery.  Some of your leg pain may go away immediately after your surgery.  Some of the pain will gradually decrease over the next few days or weeks depending on the amount of inflammation present in the nerve.      Sometimes Dr. Coelho will place a steroid preparation on the nerve during surgery.   This may help decrease the nerve inflammation for the first few days after surgery.  If some of your leg pain returns 3 to 5 days after surgery it may be due to the steroid wearing off.  The pain should not be as bad as your pre-op pain and will diminish over a period of weeks.      You should call Dr. Coelho s office if your leg pain returns suddenly and does not improve over 24 hours.    Activity    You may increase your activity as tolerated; walking is the best form of exercise after spine surgery.      Avoid bending, lifting, and twisting (BLT).  Avoid activities such as vacuuming, raking and shoveling.    Try to limit your lifting to 10-15lbs during the first 2 weeks and then increase to 20-25lbs over the next 6 weeks.    You may return to work approximately 1- 2 weeks after your surgery, if you have a sedentary or desk type job.  If you have a physical job Dr. Coelho and his staff will help you determine a return to work plan.      You may resume sexual activity when you feel ready.  Stop if you have pain.    Driving    You may drive if you feel strong enough and are not taking any narcotic pain medications.    Incision Site     The first 3 days after surgery Dr. Coelho would like you to keep your incision dry.   You may take a sponge bath during this time or cover your dressing with a transparent material called Tegaderm (sold at most pharmacies).      The first 3 days after surgery you  should change your gauze dressing at least once a day.  After 3 days you may remove the gauze dressing and leave it off, at this point you may shower without covering your incision, allow water and soap to run over your incision but do not scrub the area or soak in a tub.    Your incision is covered with steri-strips (narrow white tapes); they will get loose and fall of in 7-10 days.  If they do not fall off after 10 days you may remove them or Dr. Laquita dyer staff will remove them at your post-op visit.    Most patients have dissolvable stitches which do not need to be removed.  In some cases nylon stitches are used and they need to be removed, 10-14 days after surgery.  If you have staples or nylon sutures please call for a removal appointment with Dr. Laquita dyer nurse.    Pain Management     Take your prescribed pain medication as needed and directed.  Use Tylenol and Ibuprofen for your discomfort when you no longer need the narcotic pain medication.      If you need a refill on your pain medication call [phone], please allow 24 hours for your prescription to be refilled, Dr. Laquita dyer office does not refill pain medications on Friday.   Diet     Eat a healthy diet; this will help your recovery.    Drink plenty of fluids, water, milk or juice.    If you have trouble with constipation you should eat more fiber, drink more fluids, increase your walking or try an over the counter laxative.    Follow-up Visits    You should have a post-op appointment that was scheduled for you at the same time your surgery was scheduled. If you do not, please call Dr. Laquita dyer office when you get home from your surgery.    Write down any questions you have about your surgery, recovery, return to work and other topics you wished to be covered at your post-op visit.  This way, we will be able to address all of your questions at your next visit.    Call Dr. Laquita dyer office if you have any questions or concerns.    When to Call your  Doctor:    If you have any redness, warmth or swelling at the incision site.    If your incision opens up.    If you have increasing drainage from your incision.    If you have a temperature greater than 100.5 degrees Fahrenheit.    [address] [place] [address]  Phone: [phone]  Fax: [phone]  Web site: [email or web address]

## 2017-10-18 NOTE — PLAN OF CARE
"Problem: Patient Care Overview  Goal: Plan of Care/Patient Progress Review  Outcome: No Change  A&O. CMS - moving all extremities spontaneously/baseline numbness still present in right anterior thigh.  Bowel sounds hypoactive, flatus positive, declining prune juice of bowel meds at this time, \"I usually have diarrhea\"/last bm prior to surgery/confident patient can manage at home. VSS. Dressing changed/clean/dry/intact, no drainage/sterry strips intact. Up with 1 assist/gait belt to chair for breakfast & dressing help. C/o lower back pain, decreased with rest/ice/oxycodone/tylenol. Discharged to home via friend transport.  Patient agreed with discharge plan, no unmet needs upon discharge.        "

## 2017-10-18 NOTE — PLAN OF CARE
Problem: Patient Care Overview  Goal: Plan of Care/Patient Progress Review  A&O x 4. CMS intact. Bowel sounds active in all 4 quadrants. VSS. Dressing CDI. Up with 1 GB.Pt walked down to the nurses station and back with staff. C/o incisional pain and back pain, decreased with oxycodone, tylenol and ice. Plan to DC today.

## 2017-10-24 NOTE — OP NOTE
DATE OF SURGERY:  10/16/2017.      PREOPERATIVE DIAGNOSIS:  Severe spinal stenosis L2-3 with right leg radiculopathy including weakness of right quadriceps muscle and decreased sensation in L3 distribution.      POSTOPERATIVE DIAGNOSIS:  Severe spinal stenosis L2-3 with right leg radiculopathy including weakness of right quadriceps muscle and decreased sensation in L3 distribution.      PROCEDURE:  Bilateral L2-3 decompression.      SURGEON:  Andrea Coelho MD      ASSISTANT:  Mandi Stevens PA-C      ANESTHESIA:  General.      OPERATIVE DESCRIPTION:  Leonor Bernstein was brought to the operating room.  A general anesthetic was administered.  A Johnson catheter was placed.  She was then positioned prone on the Cox frame.  She was carefully padded and positioned and her back was prepped and draped in a routine sterile fashion.      She had had a previous decompression at the L3-4 level in 2011.  A marking needle was placed over what was felt to be the L2-3 level just above her previous incision scar.  A lateral x-ray was obtained.  This confirmed that the needle was at the L2-3 level.  The needle was removed.  After the timeout, a midline skin incision was made over the L2-3 level.  Electrocautery was used to develop the incision down to the fascia.  The fascia was incised and a clamp was placed on the interspinous ligament between L2 and L3.  Another lateral x-ray was obtained and this again confirmed our level.  This was marked with a rongeur.      The incision was extended to just above the spinous process of L2 and into the midpoint of the spinous process of L3.  A bilateral exposure out to the facet joints was performed.  A Lo retractor was placed.  At this point then, the spinous process of L2 was removed along with part of the spinous process of L3.  She had a very narrow interlaminar space and a narrow interfacet distance.  Laminotomies were performed at both L2 and L3.  She had a thickened ligamentum  flavum which was removed and also some hypertrophic facet capsules bilaterally that were removed.  Care was taken to protect the dura.  When I was done, I had good visualization of the L3 nerve roots bilaterally.  There was no compression on the spinal cord.  I removed the ligamentum flavum all the way up to the undersurface of L2 lamina where it was attached.      The wound was then irrigated with antibiotic solution.  Additional Marcaine was used in the skin, fascia and muscle.  Gelfoam and Celestone were placed over the dura and the wound was closed in a routine fashion with a Hemovac drain under the fascia and #1 Vicryl interrupted was used for the fascia, 2-0 for the subq and 3-0 Vicryl for the skin.  Dressings were applied, drapes removed and she was taken to the recovery room in good condition.      ESTIMATED BLOOD LOSS:  10 mL.      COMPLICATIONS:  None.      DRAINS:  One Hemovac.         ANDREA KEATING MD             D: 10/24/2017 10:30   T: 10/24/2017 10:54   MT: TS      Name:     LUIS CHAUDHARY   MRN:      [id number]        Account:        [id number]   :      1950           Procedure Date: 10/16/2017      Document: [id number]       cc: Andrea Hayden MD

## 2019-05-30 RX ORDER — BUPROPION HYDROCHLORIDE 150 MG/1
150 TABLET ORAL EVERY MORNING
COMMUNITY

## 2019-06-03 ENCOUNTER — ANESTHESIA (OUTPATIENT)
Dept: SURGERY | Facility: CLINIC | Age: 69
End: 2019-06-03

## 2019-06-03 ENCOUNTER — HOSPITAL ENCOUNTER (OUTPATIENT)
Facility: CLINIC | Age: 69
Discharge: HOME | End: 2019-06-03
Attending: ORTHOPAEDIC SURGERY | Admitting: ORTHOPAEDIC SURGERY
Payer: COMMERCIAL

## 2019-06-03 ENCOUNTER — ANESTHESIA EVENT (OUTPATIENT)
Dept: SURGERY | Facility: CLINIC | Age: 69
End: 2019-06-03

## 2019-06-03 VITALS
BODY MASS INDEX: 36.5 KG/M2 | HEIGHT: 65 IN | DIASTOLIC BLOOD PRESSURE: 96 MMHG | SYSTOLIC BLOOD PRESSURE: 172 MMHG | WEIGHT: 219.1 LBS | RESPIRATION RATE: 16 BRPM | TEMPERATURE: 97.5 F | OXYGEN SATURATION: 95 %

## 2019-06-03 LAB
ABO + RH BLD: NORMAL
ABO + RH BLD: NORMAL
ALBUMIN SERPL-MCNC: 3.4 G/DL (ref 3.4–5)
ALP SERPL-CCNC: 98 U/L (ref 40–150)
ALT SERPL W P-5'-P-CCNC: 17 U/L (ref 0–50)
ANION GAP SERPL CALCULATED.3IONS-SCNC: 6 MMOL/L (ref 3–14)
AST SERPL W P-5'-P-CCNC: 20 U/L (ref 0–45)
BILIRUB SERPL-MCNC: 1.3 MG/DL (ref 0.2–1.3)
BLD GP AB SCN SERPL QL: NORMAL
BLD PROD TYP BPU: NORMAL
BLOOD BANK CMNT PATIENT-IMP: NORMAL
BUN SERPL-MCNC: 20 MG/DL (ref 7–30)
CALCIUM SERPL-MCNC: 8.8 MG/DL (ref 8.5–10.1)
CHLORIDE SERPL-SCNC: 106 MMOL/L (ref 94–109)
CO2 SERPL-SCNC: 28 MMOL/L (ref 20–32)
CREAT SERPL-MCNC: 1.28 MG/DL (ref 0.52–1.04)
DEPRECATED CALCIDIOL+CALCIFEROL SERPL-MC: 53 UG/L (ref 20–75)
ERYTHROCYTE [DISTWIDTH] IN BLOOD BY AUTOMATED COUNT: 14 % (ref 10–15)
GFR SERPL CREATININE-BSD FRML MDRD: 43 ML/MIN/{1.73_M2}
GLUCOSE SERPL-MCNC: 102 MG/DL (ref 70–99)
HCT VFR BLD AUTO: 39.8 % (ref 35–47)
HGB BLD-MCNC: 13.3 G/DL (ref 11.7–15.7)
INR PPP: 0.99 (ref 0.86–1.14)
MCH RBC QN AUTO: 30.4 PG (ref 26.5–33)
MCHC RBC AUTO-ENTMCNC: 33.4 G/DL (ref 31.5–36.5)
MCV RBC AUTO: 91 FL (ref 78–100)
NUM BPU REQUESTED: 2
PLATELET # BLD AUTO: 315 10E9/L (ref 150–450)
POTASSIUM SERPL-SCNC: 3.4 MMOL/L (ref 3.4–5.3)
PROT SERPL-MCNC: 7.3 G/DL (ref 6.8–8.8)
RBC # BLD AUTO: 4.38 10E12/L (ref 3.8–5.2)
SODIUM SERPL-SCNC: 140 MMOL/L (ref 133–144)
SPECIMEN EXP DATE BLD: NORMAL
WBC # BLD AUTO: 7.8 10E9/L (ref 4–11)

## 2019-06-03 PROCEDURE — 86850 RBC ANTIBODY SCREEN: CPT | Performed by: ANESTHESIOLOGY

## 2019-06-03 PROCEDURE — 25000132 ZZH RX MED GY IP 250 OP 250 PS 637: Performed by: ORTHOPAEDIC SURGERY

## 2019-06-03 PROCEDURE — 80053 COMPREHEN METABOLIC PANEL: CPT | Performed by: ANESTHESIOLOGY

## 2019-06-03 PROCEDURE — 40000883 ZZH CANCELLED SURGERY UP TO 61-90 MINS: Performed by: ORTHOPAEDIC SURGERY

## 2019-06-03 PROCEDURE — 85610 PROTHROMBIN TIME: CPT | Performed by: ANESTHESIOLOGY

## 2019-06-03 PROCEDURE — 86900 BLOOD TYPING SEROLOGIC ABO: CPT | Performed by: ANESTHESIOLOGY

## 2019-06-03 PROCEDURE — 93005 ELECTROCARDIOGRAM TRACING: CPT

## 2019-06-03 PROCEDURE — 86901 BLOOD TYPING SEROLOGIC RH(D): CPT | Performed by: ANESTHESIOLOGY

## 2019-06-03 PROCEDURE — 85027 COMPLETE CBC AUTOMATED: CPT | Performed by: ANESTHESIOLOGY

## 2019-06-03 PROCEDURE — 36415 COLL VENOUS BLD VENIPUNCTURE: CPT | Performed by: ANESTHESIOLOGY

## 2019-06-03 PROCEDURE — 36415 COLL VENOUS BLD VENIPUNCTURE: CPT | Performed by: ORTHOPAEDIC SURGERY

## 2019-06-03 PROCEDURE — 93010 ELECTROCARDIOGRAM REPORT: CPT | Performed by: INTERNAL MEDICINE

## 2019-06-03 PROCEDURE — 86923 COMPATIBILITY TEST ELECTRIC: CPT | Performed by: ANESTHESIOLOGY

## 2019-06-03 PROCEDURE — 82306 VITAMIN D 25 HYDROXY: CPT | Performed by: ORTHOPAEDIC SURGERY

## 2019-06-03 RX ORDER — CEFAZOLIN SODIUM 2 G/100ML
2 INJECTION, SOLUTION INTRAVENOUS
Status: DISCONTINUED | OUTPATIENT
Start: 2019-06-03 | End: 2019-06-10 | Stop reason: HOSPADM

## 2019-06-03 RX ORDER — ACETAMINOPHEN 325 MG/1
975 TABLET ORAL ONCE
Status: COMPLETED | OUTPATIENT
Start: 2019-06-03 | End: 2019-06-03

## 2019-06-03 RX ORDER — SODIUM CHLORIDE, SODIUM LACTATE, POTASSIUM CHLORIDE, CALCIUM CHLORIDE 600; 310; 30; 20 MG/100ML; MG/100ML; MG/100ML; MG/100ML
INJECTION, SOLUTION INTRAVENOUS CONTINUOUS
Status: CANCELLED | OUTPATIENT
Start: 2019-06-03

## 2019-06-03 RX ORDER — SODIUM CHLORIDE, SODIUM LACTATE, POTASSIUM CHLORIDE, CALCIUM CHLORIDE 600; 310; 30; 20 MG/100ML; MG/100ML; MG/100ML; MG/100ML
INJECTION, SOLUTION INTRAVENOUS CONTINUOUS
Status: DISCONTINUED | OUTPATIENT
Start: 2019-06-03 | End: 2019-06-10 | Stop reason: HOSPADM

## 2019-06-03 RX ORDER — ONDANSETRON 4 MG/1
4 TABLET, ORALLY DISINTEGRATING ORAL EVERY 30 MIN PRN
Status: CANCELLED | OUTPATIENT
Start: 2019-06-03

## 2019-06-03 RX ORDER — FENTANYL CITRATE 50 UG/ML
25-50 INJECTION, SOLUTION INTRAMUSCULAR; INTRAVENOUS
Status: CANCELLED | OUTPATIENT
Start: 2019-06-03

## 2019-06-03 RX ORDER — CEFAZOLIN SODIUM 1 G/3ML
1 INJECTION, POWDER, FOR SOLUTION INTRAMUSCULAR; INTRAVENOUS SEE ADMIN INSTRUCTIONS
Status: DISCONTINUED | OUTPATIENT
Start: 2019-06-03 | End: 2019-06-10 | Stop reason: HOSPADM

## 2019-06-03 RX ORDER — LORAZEPAM 1 MG/1
1 TABLET ORAL ONCE
Status: ON HOLD | COMMUNITY
Start: 2019-06-03 | End: 2019-08-06

## 2019-06-03 RX ORDER — ONDANSETRON 2 MG/ML
4 INJECTION INTRAMUSCULAR; INTRAVENOUS EVERY 30 MIN PRN
Status: CANCELLED | OUTPATIENT
Start: 2019-06-03

## 2019-06-03 RX ORDER — NALOXONE HYDROCHLORIDE 0.4 MG/ML
.1-.4 INJECTION, SOLUTION INTRAMUSCULAR; INTRAVENOUS; SUBCUTANEOUS
Status: CANCELLED | OUTPATIENT
Start: 2019-06-03 | End: 2019-06-04

## 2019-06-03 RX ORDER — FLUOXETINE 20 MG/1
3 TABLET, FILM COATED ORAL EVERY MORNING
COMMUNITY

## 2019-06-03 RX ORDER — CYANOCOBALAMIN 1000 UG/ML
1 INJECTION, SOLUTION INTRAMUSCULAR; SUBCUTANEOUS
COMMUNITY

## 2019-06-03 RX ADMIN — ACETAMINOPHEN 975 MG: 325 TABLET, FILM COATED ORAL at 06:17

## 2019-06-03 ASSESSMENT — MIFFLIN-ST. JEOR: SCORE: 1524.71

## 2019-06-03 NOTE — PROGRESS NOTES
Pt hypertensive in pre op.  MDA aware.  Pt also had high BP in pre op assessment via primary MD.  Dr. Doan made aware.  Pt also has untreated EDDIE.  MDA and surgeon discussed at length reasoning for canceling surgery and addressing BP and EDDIE for several weeks prior to rescheduling surgery.  Pt and family verbalize understanding.

## 2019-06-03 NOTE — ANESTHESIA PREPROCEDURE EVALUATION
Anesthesia Pre-Procedure Evaluation    Patient: Leonor Bernstein   MRN: [id number] : 1950          Preoperative Diagnosis: DEGENERATIVE SCOLIOSIS ; RECURRENT SPINAL STENOSIS ; NEUROGENIC CLAUDICATION    Procedure(s):  REVISION DECOMPRESSION AT L 2 -S1 ; POSTERIOR LUMBAR INTERBODY FUSION L4-5 AND L 5 -S1 ; POSTERIOR SPINAL FUSION L2- PELVIS USING PLASTIC SPACER LOCAL AUTOGRAFT AND INSTRUMENTATION (CELL SAVER ; JAYSON TABLE MIDAS AM8; ALLUTIEN ( FOR SPACER) EVEREST FOR INSTRUMENTATION ) NEEDS STELTH AT THE END OF CASE    Past Medical History:   Diagnosis Date     Allergic rhinitis      Anemia     iron def     Arthritis     hip     Depressive disorder      Gastroesophageal reflux disease      Hemorrhoids      Hypertension     not on BP     Obese      Osteoporosis      RLS (restless legs syndrome)      Sciatica of right side 2019    with wweakness, numbness and tingling     Sleep apnea     doesn't tolerate CPAP     Vitamin deficiency      Past Surgical History:   Procedure Laterality Date     CHOLECYSTECTOMY       COLONOSCOPY       DECOMPRESSION LUMBAR TWO LEVELS Bilateral 10/16/2017    Procedure: DECOMPRESSION LUMBAR TWO LEVELS;  BILATERAL L2-3 DECOMPRESSION ;  Surgeon: Andrea Keating MD;  Location:  OR     GASTRIC BYPASS       GI SURGERY      gastric bypass     GYN SURGERY      ovarian cystectomy     GYN SURGERY  1986    tubal lig     LAMINECT/DISCECTOMY, LUMBAR  1970     LAMINECTOMY LUMBAR ONE LEVEL  2011    Procedure:LAMINECTOMY LUMBAR ONE LEVEL; BILATERAL L3-4 DECOMPRESSION ; Surgeon:ANDREA KEATING; Location: OR     ORTHOPEDIC SURGERY       right total hip       Anesthesia Evaluation     .             ROS/MED HX    ENT/Pulmonary: Comment: Cough, non productive, no fever/chills/signs of infection    (+)sleep apnea, doesn't use CPAP , . .    Neurologic:       Cardiovascular: Comment: Pt hypertensive at pre op, no previous history    (+) hypertension----. : . . . :. .      "  METS/Exercise Tolerance:  >4 METS   Hematologic:     (+) Anemia, -      Musculoskeletal: Comment: Spinal stenosis  S/p SERGIO  (+) arthritis,  -       GI/Hepatic:     (+) GERD Asymptomatic on medication,       Renal/Genitourinary:         Endo:     (+) Obesity, .   (-) Type II DM   Psychiatric:     (+) psychiatric history depression      Infectious Disease:        (-) Recent Fever   Malignancy:         Other:                          Physical Exam      Airway   Mallampati: III  TM distance: >3 FB  Neck ROM: full    Dental   (+) caps    Cardiovascular   Rhythm and rate: regular and normal      Pulmonary    breath sounds clear to auscultation            Lab Results   Component Value Date    HGB 14.4 10/16/2017    POTASSIUM 3.8 12/19/2011    CR 0.77 12/19/2011       Preop Vitals  BP Readings from Last 3 Encounters:   06/03/19 (!) 168/97   10/18/17 130/63   12/21/11 136/70    Pulse Readings from Last 3 Encounters:   No data found for Pulse      Resp Readings from Last 3 Encounters:   06/03/19 16   10/18/17 16   12/21/11 18    SpO2 Readings from Last 3 Encounters:   06/03/19 95%   10/18/17 92%   12/21/11 91%      Temp Readings from Last 1 Encounters:   06/03/19 36.4  C (97.5  F) (Temporal)    Ht Readings from Last 1 Encounters:   06/03/19 1.651 m (5' 5\")      Wt Readings from Last 1 Encounters:   06/03/19 99.4 kg (219 lb 1.6 oz)    Estimated body mass index is 36.46 kg/m  as calculated from the following:    Height as of this encounter: 1.651 m (5' 5\").    Weight as of this encounter: 99.4 kg (219 lb 1.6 oz).       Anesthesia Plan      History & Physical Review  History and physical reviewed and following examination; no interval change.    ASA Status:  2 .    NPO Status:  > 8 hours    Plan for General   PONV prophylaxis:  Ondansetron (or other 5HT-3) and Other (See comment)  Additional equipment: Videolaryngoscope, 2nd IV and Arterial Line Long discussion with patient with family present.  I discussed her HTN, EDDIE and " "the increased risks she is at including blindness.  I have advised patient, with family present to control HTN and EDDIE prior to a surgery of this magnitude.  Patient states \"Nothing is gonna happen.\"  I explained the likelihood of increased blood loss, fluid shifts and other factors that put her at increased risk of perioperative complications in the setting of uncontrolled HTN, but patient is adament to have surgery today as her back pain is significantly altering her quality of life.      Plan for a line, phenylephrine infusion to keep MAP >90.      After further discussion with patient and surgeon, decision is made to cancel for today.        Postoperative Care  Postoperative pain management:  Oral pain medications and IV analgesics.      Consents  Anesthetic plan, risks, benefits and alternatives discussed with:  Patient.  Use of blood products discussed: Yes.   Use of blood products discussed with Patient.  Consented to blood products.  .                 Chemo De Anda MD  "

## 2019-06-03 NOTE — PROGRESS NOTES
Admission medication history interview status for the 6/3/2019  admission is complete. See EPIC admission navigator for prior to admission medications     Medication history source reliability:Good    Medication history interview source(s):Patient    Medication history resources (including written lists, pill bottles, clinic record):mailed in list    Primary [email or web address]    Additional medication history information not noted on PTA med list :None    Time spent in this activity: 45 minutes    Prior to Admission medications    Medication Sig Last Dose Taking? Auth Provider   buPROPion (WELLBUTRIN XL) 150 MG 24 hr tablet Take 150 mg by mouth every morning 6/3/2019 at 0400 Yes Reported, Patient   clindamycin (CLEOCIN) 300 MG capsule Take 600 mg by mouth daily as needed (1 hour before dental procedure) (Takes 2 x 300mg tablet = 600mg dose)  more than a month at prn Yes Reported, Patient   cyanocobalamin (CYANOCOBALAMIN) 1000 MCG/ML injection Inject 1 mL into the muscle every 30 days more than a week Yes Reported, Patient   ergocalciferol (ERGOCALCIFEROL) 24943 UNIT capsule Take 50,000 Units by mouth Every Mon, Wed, Fri Morning  5/31/2019 at am Yes Reported, Patient   FLUoxetine 20 MG tablet Take 60 mg by mouth daily (Takes 3 x 20mg tablet = 60mg dose) 6/3/2019 at 0400 Yes Reported, Patient   LORazepam (ATIVAN) 1 MG tablet Take 1 mg by mouth At Bedtime  6/2/2019 at pm Yes Reported, Patient   Naproxen Sod-Diphenhydramine (ALEVE PM PO) Take 2 tablets by mouth At Bedtime  5/29/2019 at pm Yes Reported, Patient   LORazepam (ATIVAN) 1 MG tablet Take 1 mg by mouth once 6/3/2019 at 0400  Reported, Patient

## 2019-06-03 NOTE — PROGRESS NOTES
Discussed the patient's increased risk of surgery, blood loss, CVA, cardiovascular event etc.at the bedside with Dr. De Anda and the patient's brother.  Due to her untreated hypertension and obstructive sleep apnea.  Our recommendation was to postpone the surgery until her blood pressure has been stabilized and she has been on CPAP for at least 4 weeks.    The patient and her brother understand we will reschedule in the future.

## 2019-06-04 LAB
BLD PROD TYP BPU: NORMAL
BLD PROD TYP BPU: NORMAL
BLD UNIT ID BPU: 0
BLD UNIT ID BPU: 0
BLOOD PRODUCT CODE: NORMAL
BLOOD PRODUCT CODE: NORMAL
BPU ID: NORMAL
BPU ID: NORMAL
TRANSFUSION STATUS PATIENT QL: NORMAL

## 2019-06-06 LAB — INTERPRETATION ECG - MUSE: NORMAL

## 2019-08-04 RX ORDER — GABAPENTIN 100 MG/1
100 CAPSULE ORAL
Status: CANCELLED | OUTPATIENT
Start: 2019-08-04

## 2019-08-04 RX ORDER — CEFAZOLIN SODIUM 2 G/100ML
2 INJECTION, SOLUTION INTRAVENOUS
Status: CANCELLED | OUTPATIENT
Start: 2019-08-04

## 2019-08-04 RX ORDER — ACETAMINOPHEN 325 MG/1
975 TABLET ORAL ONCE
Status: CANCELLED | OUTPATIENT
Start: 2019-08-04 | End: 2019-08-04

## 2019-08-05 RX ORDER — AMLODIPINE BESYLATE 5 MG/1
1 TABLET ORAL EVERY MORNING
COMMUNITY

## 2019-08-06 ENCOUNTER — ANESTHESIA EVENT (OUTPATIENT)
Dept: SURGERY | Facility: CLINIC | Age: 69
DRG: 457 | End: 2019-08-06
Payer: COMMERCIAL

## 2019-08-06 ENCOUNTER — APPOINTMENT (OUTPATIENT)
Dept: GENERAL RADIOLOGY | Facility: CLINIC | Age: 69
DRG: 457 | End: 2019-08-06
Attending: ORTHOPAEDIC SURGERY
Payer: COMMERCIAL

## 2019-08-06 ENCOUNTER — HOSPITAL ENCOUNTER (INPATIENT)
Facility: CLINIC | Age: 69
LOS: 4 days | Discharge: NURSING, REHAB OR LONG-TERM CARE | DRG: 457 | End: 2019-08-10
Attending: ORTHOPAEDIC SURGERY | Admitting: ORTHOPAEDIC SURGERY
Payer: COMMERCIAL

## 2019-08-06 ENCOUNTER — ANESTHESIA (OUTPATIENT)
Dept: SURGERY | Facility: CLINIC | Age: 69
DRG: 457 | End: 2019-08-06
Payer: COMMERCIAL

## 2019-08-06 DIAGNOSIS — M41.50 DEGENERATIVE SCOLIOSIS IN ADULT PATIENT: Primary | ICD-10-CM

## 2019-08-06 DIAGNOSIS — M48.062 SPINAL STENOSIS OF LUMBAR REGION WITH NEUROGENIC CLAUDICATION: ICD-10-CM

## 2019-08-06 LAB
ABO + RH BLD: NORMAL
ABO + RH BLD: NORMAL
ANION GAP SERPL CALCULATED.3IONS-SCNC: 5 MMOL/L (ref 3–14)
BLD GP AB SCN SERPL QL: NORMAL
BLD PROD TYP BPU: NORMAL
BLOOD BANK CMNT PATIENT-IMP: NORMAL
BUN SERPL-MCNC: 18 MG/DL (ref 7–30)
CALCIUM SERPL-MCNC: 7.6 MG/DL (ref 8.5–10.1)
CHLORIDE SERPL-SCNC: 112 MMOL/L (ref 94–109)
CO2 SERPL-SCNC: 25 MMOL/L (ref 20–32)
CREAT SERPL-MCNC: 0.98 MG/DL (ref 0.52–1.04)
DEPRECATED CALCIDIOL+CALCIFEROL SERPL-MC: 32 UG/L (ref 20–75)
ERYTHROCYTE [DISTWIDTH] IN BLOOD BY AUTOMATED COUNT: 14.9 % (ref 10–15)
GFR SERPL CREATININE-BSD FRML MDRD: 59 ML/MIN/{1.73_M2}
GLUCOSE SERPL-MCNC: 116 MG/DL (ref 70–99)
HCT VFR BLD AUTO: 35.9 % (ref 35–47)
HGB BLD-MCNC: 11.4 G/DL (ref 11.7–15.7)
MCH RBC QN AUTO: 30.2 PG (ref 26.5–33)
MCHC RBC AUTO-ENTMCNC: 31.8 G/DL (ref 31.5–36.5)
MCV RBC AUTO: 95 FL (ref 78–100)
NUM BPU REQUESTED: 2
PLATELET # BLD AUTO: 236 10E9/L (ref 150–450)
POTASSIUM SERPL-SCNC: 4 MMOL/L (ref 3.4–5.3)
RBC # BLD AUTO: 3.78 10E12/L (ref 3.8–5.2)
SODIUM SERPL-SCNC: 142 MMOL/L (ref 133–144)
SPECIMEN EXP DATE BLD: NORMAL
WBC # BLD AUTO: 13.3 10E9/L (ref 4–11)

## 2019-08-06 PROCEDURE — 25800030 ZZH RX IP 258 OP 636: Performed by: NURSE ANESTHETIST, CERTIFIED REGISTERED

## 2019-08-06 PROCEDURE — 27211220 ZZ H OR ASSEMBLY BASIC A&A LINE 00208-00: Performed by: ORTHOPAEDIC SURGERY

## 2019-08-06 PROCEDURE — 36000075 ZZH SURGERY LEVEL 6 EA 15 ADDTL MIN: Performed by: ORTHOPAEDIC SURGERY

## 2019-08-06 PROCEDURE — 25000128 H RX IP 250 OP 636: Performed by: ORTHOPAEDIC SURGERY

## 2019-08-06 PROCEDURE — 25800030 ZZH RX IP 258 OP 636: Performed by: ANESTHESIOLOGY

## 2019-08-06 PROCEDURE — 86850 RBC ANTIBODY SCREEN: CPT | Performed by: ANESTHESIOLOGY

## 2019-08-06 PROCEDURE — 25000301 ZZH OR RX SURGIFLO W/THROMBIN KIT 2ML 1991 OPNP: Performed by: ORTHOPAEDIC SURGERY

## 2019-08-06 PROCEDURE — 37000009 ZZH ANESTHESIA TECHNICAL FEE, EACH ADDTL 15 MIN: Performed by: ORTHOPAEDIC SURGERY

## 2019-08-06 PROCEDURE — 86900 BLOOD TYPING SEROLOGIC ABO: CPT | Performed by: ANESTHESIOLOGY

## 2019-08-06 PROCEDURE — 40000985 XR LUMBAR SPINE PORT 1 VW

## 2019-08-06 PROCEDURE — 82306 VITAMIN D 25 HYDROXY: CPT | Performed by: ORTHOPAEDIC SURGERY

## 2019-08-06 PROCEDURE — 71000012 ZZH RECOVERY PHASE 1 LEVEL 1 FIRST HR: Performed by: ORTHOPAEDIC SURGERY

## 2019-08-06 PROCEDURE — 27211219 ZZ H OR RESEVOIR 3L 150 MICRON FILTER CELLSAVER HARDSHELL 00205-00: Performed by: ORTHOPAEDIC SURGERY

## 2019-08-06 PROCEDURE — 27211203: Performed by: ORTHOPAEDIC SURGERY

## 2019-08-06 PROCEDURE — 0SG3071 FUSION OF LUMBOSACRAL JOINT WITH AUTOLOGOUS TISSUE SUBSTITUTE, POSTERIOR APPROACH, POSTERIOR COLUMN, OPEN APPROACH: ICD-10-PCS | Performed by: ORTHOPAEDIC SURGERY

## 2019-08-06 PROCEDURE — 85027 COMPLETE CBC AUTOMATED: CPT | Performed by: PHYSICIAN ASSISTANT

## 2019-08-06 PROCEDURE — 86891 AUTOLOGOUS BLOOD OP SALVAGE: CPT | Performed by: ORTHOPAEDIC SURGERY

## 2019-08-06 PROCEDURE — 0SG1071 FUSION OF 2 OR MORE LUMBAR VERTEBRAL JOINTS WITH AUTOLOGOUS TISSUE SUBSTITUTE, POSTERIOR APPROACH, POSTERIOR COLUMN, OPEN APPROACH: ICD-10-PCS | Performed by: ORTHOPAEDIC SURGERY

## 2019-08-06 PROCEDURE — 86901 BLOOD TYPING SEROLOGIC RH(D): CPT | Performed by: ANESTHESIOLOGY

## 2019-08-06 PROCEDURE — 12000000 ZZH R&B MED SURG/OB

## 2019-08-06 PROCEDURE — 27211215 ZZ H OR FILTER BLOOD TRANS 40 MICRON SQ40S: Performed by: ORTHOPAEDIC SURGERY

## 2019-08-06 PROCEDURE — 25800030 ZZH RX IP 258 OP 636: Performed by: ORTHOPAEDIC SURGERY

## 2019-08-06 PROCEDURE — P9041 ALBUMIN (HUMAN),5%, 50ML: HCPCS | Performed by: NURSE ANESTHETIST, CERTIFIED REGISTERED

## 2019-08-06 PROCEDURE — 25000132 ZZH RX MED GY IP 250 OP 250 PS 637: Performed by: ORTHOPAEDIC SURGERY

## 2019-08-06 PROCEDURE — 36000073 ZZH SURGERY LEVEL 6 1ST 30 MIN: Performed by: ORTHOPAEDIC SURGERY

## 2019-08-06 PROCEDURE — 01NB0ZZ RELEASE LUMBAR NERVE, OPEN APPROACH: ICD-10-PCS | Performed by: ORTHOPAEDIC SURGERY

## 2019-08-06 PROCEDURE — 99232 SBSQ HOSP IP/OBS MODERATE 35: CPT | Performed by: PHYSICIAN ASSISTANT

## 2019-08-06 PROCEDURE — 36415 COLL VENOUS BLD VENIPUNCTURE: CPT | Performed by: ORTHOPAEDIC SURGERY

## 2019-08-06 PROCEDURE — 25000125 ZZHC RX 250: Performed by: ORTHOPAEDIC SURGERY

## 2019-08-06 PROCEDURE — 36415 COLL VENOUS BLD VENIPUNCTURE: CPT | Performed by: PHYSICIAN ASSISTANT

## 2019-08-06 PROCEDURE — 27210794 ZZH OR GENERAL SUPPLY STERILE: Performed by: ORTHOPAEDIC SURGERY

## 2019-08-06 PROCEDURE — 37000008 ZZH ANESTHESIA TECHNICAL FEE, 1ST 30 MIN: Performed by: ORTHOPAEDIC SURGERY

## 2019-08-06 PROCEDURE — 25000125 ZZHC RX 250: Performed by: NURSE ANESTHETIST, CERTIFIED REGISTERED

## 2019-08-06 PROCEDURE — 86923 COMPATIBILITY TEST ELECTRIC: CPT | Performed by: ANESTHESIOLOGY

## 2019-08-06 PROCEDURE — C1762 CONN TISS, HUMAN(INC FASCIA): HCPCS | Performed by: ORTHOPAEDIC SURGERY

## 2019-08-06 PROCEDURE — 25000128 H RX IP 250 OP 636: Performed by: NURSE ANESTHETIST, CERTIFIED REGISTERED

## 2019-08-06 PROCEDURE — 25000566 ZZH SEVOFLURANE, EA 15 MIN: Performed by: ORTHOPAEDIC SURGERY

## 2019-08-06 PROCEDURE — 40000170 ZZH STATISTIC PRE-PROCEDURE ASSESSMENT II: Performed by: ORTHOPAEDIC SURGERY

## 2019-08-06 PROCEDURE — 80048 BASIC METABOLIC PNL TOTAL CA: CPT | Performed by: PHYSICIAN ASSISTANT

## 2019-08-06 PROCEDURE — 99207 ZZC CONSULT E&M CHANGED TO SUBSEQUENT LEVEL: CPT | Performed by: PHYSICIAN ASSISTANT

## 2019-08-06 DEVICE — GRAFT BONE PUTTY DBM MIX 05ML: Type: IMPLANTABLE DEVICE | Status: FUNCTIONAL

## 2019-08-06 RX ORDER — NEOSTIGMINE METHYLSULFATE 1 MG/ML
VIAL (ML) INJECTION PRN
Status: DISCONTINUED | OUTPATIENT
Start: 2019-08-06 | End: 2019-08-06

## 2019-08-06 RX ORDER — EPHEDRINE SULFATE 50 MG/ML
INJECTION, SOLUTION INTRAMUSCULAR; INTRAVENOUS; SUBCUTANEOUS PRN
Status: DISCONTINUED | OUTPATIENT
Start: 2019-08-06 | End: 2019-08-06

## 2019-08-06 RX ORDER — NALOXONE HYDROCHLORIDE 0.4 MG/ML
.1-.4 INJECTION, SOLUTION INTRAMUSCULAR; INTRAVENOUS; SUBCUTANEOUS
Status: DISCONTINUED | OUTPATIENT
Start: 2019-08-06 | End: 2019-08-10 | Stop reason: HOSPADM

## 2019-08-06 RX ORDER — CEFAZOLIN SODIUM 2 G/100ML
2 INJECTION, SOLUTION INTRAVENOUS SEE ADMIN INSTRUCTIONS
Status: DISCONTINUED | OUTPATIENT
Start: 2019-08-06 | End: 2019-08-06 | Stop reason: HOSPADM

## 2019-08-06 RX ORDER — LIDOCAINE 40 MG/G
CREAM TOPICAL
Status: DISCONTINUED | OUTPATIENT
Start: 2019-08-06 | End: 2019-08-10 | Stop reason: HOSPADM

## 2019-08-06 RX ORDER — OXYCODONE HYDROCHLORIDE 5 MG/1
5 TABLET ORAL
Status: DISCONTINUED | OUTPATIENT
Start: 2019-08-06 | End: 2019-08-07

## 2019-08-06 RX ORDER — GABAPENTIN 100 MG/1
100 CAPSULE ORAL
Status: COMPLETED | OUTPATIENT
Start: 2019-08-06 | End: 2019-08-06

## 2019-08-06 RX ORDER — HYDROMORPHONE HYDROCHLORIDE 1 MG/ML
.3-.5 INJECTION, SOLUTION INTRAMUSCULAR; INTRAVENOUS; SUBCUTANEOUS EVERY 5 MIN PRN
Status: DISCONTINUED | OUTPATIENT
Start: 2019-08-06 | End: 2019-08-06 | Stop reason: HOSPADM

## 2019-08-06 RX ORDER — ACETAMINOPHEN 325 MG/1
975 TABLET ORAL ONCE
Status: COMPLETED | OUTPATIENT
Start: 2019-08-06 | End: 2019-08-06

## 2019-08-06 RX ORDER — FENTANYL CITRATE 50 UG/ML
25-50 INJECTION, SOLUTION INTRAMUSCULAR; INTRAVENOUS
Status: DISCONTINUED | OUTPATIENT
Start: 2019-08-06 | End: 2019-08-06 | Stop reason: HOSPADM

## 2019-08-06 RX ORDER — LORAZEPAM 1 MG/1
1 TABLET ORAL AT BEDTIME
Status: DISCONTINUED | OUTPATIENT
Start: 2019-08-06 | End: 2019-08-08

## 2019-08-06 RX ORDER — METOCLOPRAMIDE HYDROCHLORIDE 5 MG/ML
5 INJECTION INTRAMUSCULAR; INTRAVENOUS EVERY 6 HOURS PRN
Status: DISCONTINUED | OUTPATIENT
Start: 2019-08-06 | End: 2019-08-10 | Stop reason: HOSPADM

## 2019-08-06 RX ORDER — ONDANSETRON 4 MG/1
4 TABLET, ORALLY DISINTEGRATING ORAL EVERY 6 HOURS PRN
Status: DISCONTINUED | OUTPATIENT
Start: 2019-08-06 | End: 2019-08-10 | Stop reason: HOSPADM

## 2019-08-06 RX ORDER — CEFAZOLIN SODIUM 2 G/100ML
2 INJECTION, SOLUTION INTRAVENOUS
Status: COMPLETED | OUTPATIENT
Start: 2019-08-06 | End: 2019-08-06

## 2019-08-06 RX ORDER — ONDANSETRON 2 MG/ML
4 INJECTION INTRAMUSCULAR; INTRAVENOUS EVERY 30 MIN PRN
Status: DISCONTINUED | OUTPATIENT
Start: 2019-08-06 | End: 2019-08-06 | Stop reason: HOSPADM

## 2019-08-06 RX ORDER — LIDOCAINE HYDROCHLORIDE 20 MG/ML
INJECTION, SOLUTION INFILTRATION; PERINEURAL PRN
Status: DISCONTINUED | OUTPATIENT
Start: 2019-08-06 | End: 2019-08-06

## 2019-08-06 RX ORDER — ACETAMINOPHEN 325 MG/1
975 TABLET ORAL EVERY 8 HOURS
Status: DISPENSED | OUTPATIENT
Start: 2019-08-06 | End: 2019-08-09

## 2019-08-06 RX ORDER — PROPOFOL 10 MG/ML
INJECTION, EMULSION INTRAVENOUS PRN
Status: DISCONTINUED | OUTPATIENT
Start: 2019-08-06 | End: 2019-08-06

## 2019-08-06 RX ORDER — ACETAMINOPHEN 325 MG/1
650 TABLET ORAL EVERY 4 HOURS PRN
Status: DISCONTINUED | OUTPATIENT
Start: 2019-08-09 | End: 2019-08-10 | Stop reason: HOSPADM

## 2019-08-06 RX ORDER — HYDROMORPHONE HYDROCHLORIDE 1 MG/ML
0.2 INJECTION, SOLUTION INTRAMUSCULAR; INTRAVENOUS; SUBCUTANEOUS
Status: DISCONTINUED | OUTPATIENT
Start: 2019-08-06 | End: 2019-08-08

## 2019-08-06 RX ORDER — AMOXICILLIN 250 MG
1 CAPSULE ORAL 2 TIMES DAILY
Status: DISCONTINUED | OUTPATIENT
Start: 2019-08-06 | End: 2019-08-10 | Stop reason: HOSPADM

## 2019-08-06 RX ORDER — BISACODYL 10 MG
10 SUPPOSITORY, RECTAL RECTAL DAILY PRN
Status: DISCONTINUED | OUTPATIENT
Start: 2019-08-06 | End: 2019-08-10 | Stop reason: HOSPADM

## 2019-08-06 RX ORDER — ONDANSETRON 2 MG/ML
4 INJECTION INTRAMUSCULAR; INTRAVENOUS EVERY 6 HOURS PRN
Status: DISCONTINUED | OUTPATIENT
Start: 2019-08-06 | End: 2019-08-10 | Stop reason: HOSPADM

## 2019-08-06 RX ORDER — BUPIVACAINE HYDROCHLORIDE AND EPINEPHRINE 2.5; 5 MG/ML; UG/ML
INJECTION, SOLUTION EPIDURAL; INFILTRATION; INTRACAUDAL; PERINEURAL PRN
Status: DISCONTINUED | OUTPATIENT
Start: 2019-08-06 | End: 2019-08-06 | Stop reason: HOSPADM

## 2019-08-06 RX ORDER — AMOXICILLIN 250 MG
2 CAPSULE ORAL 2 TIMES DAILY
Status: DISCONTINUED | OUTPATIENT
Start: 2019-08-06 | End: 2019-08-10 | Stop reason: HOSPADM

## 2019-08-06 RX ORDER — SODIUM CHLORIDE, SODIUM LACTATE, POTASSIUM CHLORIDE, CALCIUM CHLORIDE 600; 310; 30; 20 MG/100ML; MG/100ML; MG/100ML; MG/100ML
INJECTION, SOLUTION INTRAVENOUS CONTINUOUS
Status: DISCONTINUED | OUTPATIENT
Start: 2019-08-06 | End: 2019-08-06 | Stop reason: HOSPADM

## 2019-08-06 RX ORDER — METOCLOPRAMIDE 5 MG/1
5 TABLET ORAL EVERY 6 HOURS PRN
Status: DISCONTINUED | OUTPATIENT
Start: 2019-08-06 | End: 2019-08-10 | Stop reason: HOSPADM

## 2019-08-06 RX ORDER — PROPOFOL 10 MG/ML
INJECTION, EMULSION INTRAVENOUS CONTINUOUS PRN
Status: DISCONTINUED | OUTPATIENT
Start: 2019-08-06 | End: 2019-08-06

## 2019-08-06 RX ORDER — ERGOCALCIFEROL 1.25 MG/1
50000 CAPSULE, LIQUID FILLED ORAL
Status: DISCONTINUED | OUTPATIENT
Start: 2019-08-07 | End: 2019-08-10 | Stop reason: HOSPADM

## 2019-08-06 RX ORDER — LORAZEPAM 0.5 MG/1
.5-1 TABLET ORAL EVERY 6 HOURS PRN
Status: DISCONTINUED | OUTPATIENT
Start: 2019-08-06 | End: 2019-08-08

## 2019-08-06 RX ORDER — SODIUM CHLORIDE AND POTASSIUM CHLORIDE 150; 900 MG/100ML; MG/100ML
INJECTION, SOLUTION INTRAVENOUS CONTINUOUS
Status: DISCONTINUED | OUTPATIENT
Start: 2019-08-06 | End: 2019-08-07

## 2019-08-06 RX ORDER — BUPROPION HYDROCHLORIDE 150 MG/1
150 TABLET ORAL EVERY MORNING
Status: DISCONTINUED | OUTPATIENT
Start: 2019-08-07 | End: 2019-08-10 | Stop reason: HOSPADM

## 2019-08-06 RX ORDER — GLYCOPYRROLATE 0.2 MG/ML
INJECTION, SOLUTION INTRAMUSCULAR; INTRAVENOUS PRN
Status: DISCONTINUED | OUTPATIENT
Start: 2019-08-06 | End: 2019-08-06

## 2019-08-06 RX ORDER — CEFAZOLIN SODIUM 1 G/3ML
1 INJECTION, POWDER, FOR SOLUTION INTRAMUSCULAR; INTRAVENOUS SEE ADMIN INSTRUCTIONS
Status: DISCONTINUED | OUTPATIENT
Start: 2019-08-06 | End: 2019-08-06 | Stop reason: HOSPADM

## 2019-08-06 RX ORDER — ONDANSETRON 4 MG/1
4 TABLET, ORALLY DISINTEGRATING ORAL EVERY 30 MIN PRN
Status: DISCONTINUED | OUTPATIENT
Start: 2019-08-06 | End: 2019-08-06 | Stop reason: HOSPADM

## 2019-08-06 RX ORDER — SODIUM CHLORIDE 9 MG/ML
INJECTION, SOLUTION INTRAVENOUS CONTINUOUS PRN
Status: DISCONTINUED | OUTPATIENT
Start: 2019-08-06 | End: 2019-08-06

## 2019-08-06 RX ORDER — AMLODIPINE BESYLATE 5 MG/1
5 TABLET ORAL DAILY
Status: DISCONTINUED | OUTPATIENT
Start: 2019-08-07 | End: 2019-08-10 | Stop reason: HOSPADM

## 2019-08-06 RX ORDER — FENTANYL CITRATE 50 UG/ML
INJECTION, SOLUTION INTRAMUSCULAR; INTRAVENOUS PRN
Status: DISCONTINUED | OUTPATIENT
Start: 2019-08-06 | End: 2019-08-06

## 2019-08-06 RX ORDER — LORAZEPAM 2 MG/ML
.5-1 INJECTION INTRAMUSCULAR EVERY 6 HOURS PRN
Status: DISCONTINUED | OUTPATIENT
Start: 2019-08-06 | End: 2019-08-08

## 2019-08-06 RX ORDER — ALBUMIN, HUMAN INJ 5% 5 %
SOLUTION INTRAVENOUS CONTINUOUS PRN
Status: DISCONTINUED | OUTPATIENT
Start: 2019-08-06 | End: 2019-08-06

## 2019-08-06 RX ORDER — ONDANSETRON 2 MG/ML
INJECTION INTRAMUSCULAR; INTRAVENOUS PRN
Status: DISCONTINUED | OUTPATIENT
Start: 2019-08-06 | End: 2019-08-06

## 2019-08-06 RX ORDER — NALOXONE HYDROCHLORIDE 0.4 MG/ML
.1-.4 INJECTION, SOLUTION INTRAMUSCULAR; INTRAVENOUS; SUBCUTANEOUS
Status: CANCELLED | OUTPATIENT
Start: 2019-08-06 | End: 2019-08-07

## 2019-08-06 RX ORDER — VECURONIUM BROMIDE 1 MG/ML
INJECTION, POWDER, LYOPHILIZED, FOR SOLUTION INTRAVENOUS PRN
Status: DISCONTINUED | OUTPATIENT
Start: 2019-08-06 | End: 2019-08-06

## 2019-08-06 RX ORDER — CEFAZOLIN SODIUM 1 G/3ML
1 INJECTION, POWDER, FOR SOLUTION INTRAMUSCULAR; INTRAVENOUS EVERY 8 HOURS
Status: COMPLETED | OUTPATIENT
Start: 2019-08-06 | End: 2019-08-07

## 2019-08-06 RX ADMIN — VECURONIUM BROMIDE 2 MG: 1 INJECTION, POWDER, LYOPHILIZED, FOR SOLUTION INTRAVENOUS at 09:30

## 2019-08-06 RX ADMIN — ONDANSETRON 4 MG: 2 INJECTION INTRAMUSCULAR; INTRAVENOUS at 12:55

## 2019-08-06 RX ADMIN — HYDROMORPHONE HYDROCHLORIDE 0.5 MG: 1 INJECTION, SOLUTION INTRAMUSCULAR; INTRAVENOUS; SUBCUTANEOUS at 13:02

## 2019-08-06 RX ADMIN — FENTANYL CITRATE 50 MCG: 50 INJECTION, SOLUTION INTRAMUSCULAR; INTRAVENOUS at 08:28

## 2019-08-06 RX ADMIN — LIDOCAINE HYDROCHLORIDE 40 MG: 20 INJECTION, SOLUTION INFILTRATION; PERINEURAL at 07:50

## 2019-08-06 RX ADMIN — PROPOFOL 100 MG: 10 INJECTION, EMULSION INTRAVENOUS at 08:28

## 2019-08-06 RX ADMIN — SODIUM CHLORIDE, POTASSIUM CHLORIDE, SODIUM LACTATE AND CALCIUM CHLORIDE: 600; 310; 30; 20 INJECTION, SOLUTION INTRAVENOUS at 10:34

## 2019-08-06 RX ADMIN — PROPOFOL 50 MG: 10 INJECTION, EMULSION INTRAVENOUS at 07:59

## 2019-08-06 RX ADMIN — LIDOCAINE HYDROCHLORIDE 60 MG: 20 INJECTION, SOLUTION INFILTRATION; PERINEURAL at 08:28

## 2019-08-06 RX ADMIN — OXYCODONE HYDROCHLORIDE 5 MG: 5 TABLET ORAL at 23:29

## 2019-08-06 RX ADMIN — Medication 5 MG: at 12:06

## 2019-08-06 RX ADMIN — Medication 5 MG: at 13:22

## 2019-08-06 RX ADMIN — VECURONIUM BROMIDE 2 MG: 1 INJECTION, POWDER, LYOPHILIZED, FOR SOLUTION INTRAVENOUS at 09:24

## 2019-08-06 RX ADMIN — ALBUMIN HUMAN: 0.05 INJECTION, SOLUTION INTRAVENOUS at 11:00

## 2019-08-06 RX ADMIN — SODIUM CHLORIDE, POTASSIUM CHLORIDE, SODIUM LACTATE AND CALCIUM CHLORIDE: 600; 310; 30; 20 INJECTION, SOLUTION INTRAVENOUS at 06:52

## 2019-08-06 RX ADMIN — CEFAZOLIN SODIUM 2 G: 2 INJECTION, SOLUTION INTRAVENOUS at 08:30

## 2019-08-06 RX ADMIN — NEOSTIGMINE METHYLSULFATE 5 MG: 1 INJECTION, SOLUTION INTRAVENOUS at 13:30

## 2019-08-06 RX ADMIN — POTASSIUM CHLORIDE AND SODIUM CHLORIDE: 900; 150 INJECTION, SOLUTION INTRAVENOUS at 16:38

## 2019-08-06 RX ADMIN — Medication 5 MG: at 12:54

## 2019-08-06 RX ADMIN — LORAZEPAM 1 MG: 1 TABLET ORAL at 21:02

## 2019-08-06 RX ADMIN — Medication 5 MG: at 10:29

## 2019-08-06 RX ADMIN — GLYCOPYRROLATE 0.8 MG: 0.2 INJECTION, SOLUTION INTRAMUSCULAR; INTRAVENOUS at 13:30

## 2019-08-06 RX ADMIN — Medication 10 MG: at 08:41

## 2019-08-06 RX ADMIN — VECURONIUM BROMIDE 2 MG: 1 INJECTION, POWDER, LYOPHILIZED, FOR SOLUTION INTRAVENOUS at 10:25

## 2019-08-06 RX ADMIN — PROPOFOL 50 MG: 10 INJECTION, EMULSION INTRAVENOUS at 07:55

## 2019-08-06 RX ADMIN — VECURONIUM BROMIDE 2 MG: 1 INJECTION, POWDER, LYOPHILIZED, FOR SOLUTION INTRAVENOUS at 11:10

## 2019-08-06 RX ADMIN — OXYCODONE HYDROCHLORIDE 5 MG: 5 TABLET ORAL at 20:10

## 2019-08-06 RX ADMIN — VECURONIUM BROMIDE 2 MG: 1 INJECTION, POWDER, LYOPHILIZED, FOR SOLUTION INTRAVENOUS at 12:21

## 2019-08-06 RX ADMIN — PHENYLEPHRINE HYDROCHLORIDE 50 MCG: 10 INJECTION INTRAVENOUS at 09:06

## 2019-08-06 RX ADMIN — PHENYLEPHRINE HYDROCHLORIDE 0.4 MCG/KG/MIN: 10 INJECTION INTRAVENOUS at 09:10

## 2019-08-06 RX ADMIN — PROPOFOL 100 MG: 10 INJECTION, EMULSION INTRAVENOUS at 07:50

## 2019-08-06 RX ADMIN — CEFAZOLIN SODIUM 2 G: 2 INJECTION, SOLUTION INTRAVENOUS at 12:30

## 2019-08-06 RX ADMIN — CEFAZOLIN 1 G: 1 INJECTION, POWDER, FOR SOLUTION INTRAMUSCULAR; INTRAVENOUS at 20:13

## 2019-08-06 RX ADMIN — DEXMEDETOMIDINE HYDROCHLORIDE 0.3 MCG/KG/HR: 100 INJECTION, SOLUTION INTRAVENOUS at 08:05

## 2019-08-06 RX ADMIN — Medication 5 MG: at 12:42

## 2019-08-06 RX ADMIN — ACETAMINOPHEN 975 MG: 325 TABLET, FILM COATED ORAL at 06:19

## 2019-08-06 RX ADMIN — PHENYLEPHRINE HYDROCHLORIDE 50 MCG: 10 INJECTION INTRAVENOUS at 10:29

## 2019-08-06 RX ADMIN — PHENYLEPHRINE HYDROCHLORIDE 100 MCG: 10 INJECTION INTRAVENOUS at 13:39

## 2019-08-06 RX ADMIN — FENTANYL CITRATE 50 MCG: 50 INJECTION, SOLUTION INTRAMUSCULAR; INTRAVENOUS at 10:20

## 2019-08-06 RX ADMIN — Medication 5 MG: at 08:37

## 2019-08-06 RX ADMIN — GABAPENTIN 100 MG: 100 CAPSULE ORAL at 06:19

## 2019-08-06 RX ADMIN — DEXMEDETOMIDINE HYDROCHLORIDE 0.3 MCG/KG/HR: 100 INJECTION, SOLUTION INTRAVENOUS at 11:22

## 2019-08-06 RX ADMIN — Medication 5 MG: at 09:10

## 2019-08-06 RX ADMIN — PROPOFOL 30 MCG/KG/MIN: 10 INJECTION, EMULSION INTRAVENOUS at 08:05

## 2019-08-06 RX ADMIN — HYDROMORPHONE HYDROCHLORIDE 0.5 MG: 1 INJECTION, SOLUTION INTRAMUSCULAR; INTRAVENOUS; SUBCUTANEOUS at 11:50

## 2019-08-06 RX ADMIN — HYDROMORPHONE HYDROCHLORIDE 0.2 MG: 1 INJECTION, SOLUTION INTRAMUSCULAR; INTRAVENOUS; SUBCUTANEOUS at 20:58

## 2019-08-06 RX ADMIN — PHENYLEPHRINE HYDROCHLORIDE 50 MCG: 10 INJECTION INTRAVENOUS at 12:54

## 2019-08-06 RX ADMIN — FENTANYL CITRATE 50 MCG: 50 INJECTION, SOLUTION INTRAMUSCULAR; INTRAVENOUS at 09:20

## 2019-08-06 RX ADMIN — VECURONIUM BROMIDE 2 MG: 1 INJECTION, POWDER, LYOPHILIZED, FOR SOLUTION INTRAVENOUS at 08:34

## 2019-08-06 RX ADMIN — MIDAZOLAM 1 MG: 1 INJECTION INTRAMUSCULAR; INTRAVENOUS at 07:42

## 2019-08-06 RX ADMIN — FENTANYL CITRATE 100 MCG: 50 INJECTION, SOLUTION INTRAMUSCULAR; INTRAVENOUS at 07:50

## 2019-08-06 RX ADMIN — FENTANYL CITRATE 50 MCG: 50 INJECTION, SOLUTION INTRAMUSCULAR; INTRAVENOUS at 11:24

## 2019-08-06 RX ADMIN — PHENYLEPHRINE HYDROCHLORIDE 100 MCG: 10 INJECTION INTRAVENOUS at 11:32

## 2019-08-06 RX ADMIN — Medication 5 MG: at 08:55

## 2019-08-06 RX ADMIN — MIDAZOLAM 1 MG: 1 INJECTION INTRAMUSCULAR; INTRAVENOUS at 07:44

## 2019-08-06 RX ADMIN — SODIUM CHLORIDE: 9 INJECTION, SOLUTION INTRAVENOUS at 08:50

## 2019-08-06 RX ADMIN — ROCURONIUM BROMIDE 50 MG: 10 INJECTION INTRAVENOUS at 07:50

## 2019-08-06 ASSESSMENT — MIFFLIN-ST. JEOR: SCORE: 1542.4

## 2019-08-06 ASSESSMENT — ACTIVITIES OF DAILY LIVING (ADL)
TOILETING: 0-->INDEPENDENT
SWALLOWING: 0-->SWALLOWS FOODS/LIQUIDS WITHOUT DIFFICULTY
RETIRED_COMMUNICATION: 0-->UNDERSTANDS/COMMUNICATES WITHOUT DIFFICULTY
COGNITION: 0 - NO COGNITION ISSUES REPORTED
RETIRED_EATING: 0-->INDEPENDENT
FALL_HISTORY_WITHIN_LAST_SIX_MONTHS: YES
BATHING: 0-->INDEPENDENT
NUMBER_OF_TIMES_PATIENT_HAS_FALLEN_WITHIN_LAST_SIX_MONTHS: 1
AMBULATION: 0-->INDEPENDENT
TRANSFERRING: 0-->INDEPENDENT
DRESS: 0-->INDEPENDENT
ADLS_ACUITY_SCORE: 10
ADLS_ACUITY_SCORE: 12

## 2019-08-06 ASSESSMENT — LIFESTYLE VARIABLES: TOBACCO_USE: 1

## 2019-08-06 NOTE — ANESTHESIA PREPROCEDURE EVALUATION
Anesthesia Pre-Procedure Evaluation    Patient: Leonor Bernstein   MRN: [id number] : 1950          Preoperative Diagnosis: DEGENERATIVE SCOLIOSIS ; RECURRENT SPINAL STENOSIS ; NEUROGENIC CLAUDICATION    Procedure(s):  REVISION DECOMPRESSION AT L 2 -S1 ; POSTERIOR LUMBAR INTERBODY FUSION L4-5 AND L 5 -S1 ; POSTERIOR SPINAL FUSION L2- PELVIS USING PLASTIC SPACER LOCAL AUTOGRAFT AND INSTRUMENTATION (CELL SAVER ; JAYSON TABLE MIDAS AM8; ALLUTIEN ( FOR SPACER) EVEREST FOR INSTRUMENTATION)    Past Medical History:   Diagnosis Date     Allergic rhinitis      Anemia     iron def     Arthritis     hip     Depressive disorder      Gastroesophageal reflux disease      Hemorrhoids      Hypertension     not on BP     Obese      Osteoporosis      RLS (restless legs syndrome)      Sciatica of right side 2019    with wweakness, numbness and tingling     Sleep apnea     doesn't tolerate CPAP     Vitamin deficiency      Past Surgical History:   Procedure Laterality Date     CHOLECYSTECTOMY       COLONOSCOPY       DECOMPRESSION LUMBAR TWO LEVELS Bilateral 10/16/2017    Procedure: DECOMPRESSION LUMBAR TWO LEVELS;  BILATERAL L2-3 DECOMPRESSION ;  Surgeon: Andrea Keating MD;  Location:  OR     GASTRIC BYPASS       GI SURGERY      gastric bypass     GYN SURGERY      ovarian cystectomy     GYN SURGERY  1986    tubal lig     LAMINECT/DISCECTOMY, LUMBAR  1970     LAMINECTOMY LUMBAR ONE LEVEL  2011    Procedure:LAMINECTOMY LUMBAR ONE LEVEL; BILATERAL L3-4 DECOMPRESSION ; Surgeon:ANDREA KEATING; Location: OR     ORTHOPEDIC SURGERY       right total hip       Anesthesia Evaluation     .             ROS/MED HX    ENT/Pulmonary: Comment: Cough, non productive, no fever/chills/signs of infection    (+)sleep apnea, tobacco use, Past use doesn't use CPAP , . .    Neurologic:       Cardiovascular: Comment: Pt hypertensive at pre op, no previous history    (+) hypertension----. : . . . :. .      (-) CAD  "  METS/Exercise Tolerance:  >4 METS   Hematologic:     (+) Anemia, -      Musculoskeletal: Comment: Spinal stenosis  S/p SERGIO  (+) arthritis,  -       GI/Hepatic: Comment: S/p gastric bypass    (+) GERD Asymptomatic on medication,       Renal/Genitourinary:         Endo:     (+) Obesity, .   (-) Type II DM   Psychiatric:     (+) psychiatric history depression      Infectious Disease:        (-) Recent Fever   Malignancy:         Other:                          Physical Exam  Normal systems: dental    Airway   Mallampati: II  TM distance: >3 FB  Neck ROM: full    Dental     Cardiovascular   Rhythm and rate: regular and normal      Pulmonary    breath sounds clear to auscultation            Lab Results   Component Value Date    WBC 7.8 06/03/2019    HGB 13.3 06/03/2019    HCT 39.8 06/03/2019     06/03/2019     06/03/2019    POTASSIUM 3.4 06/03/2019    CHLORIDE 106 06/03/2019    CO2 28 06/03/2019    BUN 20 06/03/2019    CR 1.28 (H) 06/03/2019     (H) 06/03/2019    VICENTE 8.8 06/03/2019    ALBUMIN 3.4 06/03/2019    PROTTOTAL 7.3 06/03/2019    ALT 17 06/03/2019    AST 20 06/03/2019    ALKPHOS 98 06/03/2019    BILITOTAL 1.3 06/03/2019    INR 0.99 06/03/2019       Preop Vitals  BP Readings from Last 3 Encounters:   08/06/19 (!) 145/77   06/03/19 (!) 172/96   10/18/17 130/63    Pulse Readings from Last 3 Encounters:   08/06/19 69      Resp Readings from Last 3 Encounters:   08/06/19 16   06/03/19 16   10/18/17 16    SpO2 Readings from Last 3 Encounters:   08/06/19 96%   06/03/19 95%   10/18/17 92%      Temp Readings from Last 1 Encounters:   08/06/19 36.6  C (97.8  F) (Temporal)    Ht Readings from Last 1 Encounters:   08/06/19 1.651 m (5' 5\")      Wt Readings from Last 1 Encounters:   08/06/19 101.2 kg (223 lb)    Estimated body mass index is 37.11 kg/m  as calculated from the following:    Height as of this encounter: 1.651 m (5' 5\").    Weight as of this encounter: 101.2 kg (223 lb).       Anesthesia " Plan      History & Physical Review  History and physical reviewed and following examination; no interval change.    ASA Status:  2 .    NPO Status:  > 8 hours    Plan for General and ETT with Intravenous and Propofol induction. Maintenance will be Balanced.    PONV prophylaxis:  Ondansetron (or other 5HT-3) and Dexamethasone or Solumedrol  Additional equipment: 2nd IV Precedex/propofol infusion      Postoperative Care  Postoperative pain management:  IV analgesics and Oral pain medications.      Consents  Anesthetic plan, risks, benefits and alternatives discussed with:  Patient.  Use of blood products discussed: Yes.   Use of blood products discussed with Patient.  Consented to blood products.  .                 Chemo De Anda MD

## 2019-08-06 NOTE — OP NOTE
Procedure Date: 08/06/2019      PREOPERATIVE DIAGNOSIS:  Degenerative scoliosis, recurrent spinal stenosis and neurogenic claudication.      POSTOPERATIVE DIAGNOSIS:  Degenerative scoliosis, recurrent spinal stenosis and neurogenic claudication.      PROCEDURE:  Revision decompression of bilateral L2, L3, L4, L5 and S1 nerves, posterior spinal fusion from L2 to the sacrum using local autograft and demineralized bone matrix.      SURGEON:  Raj Doan MD.      ASSISTANT:  Delilah Lee PA-C.      ANESTHESIA:  General with an endotracheal tube.      INDICATIONS FOR PROCEDURE:  Ms. Bernstein is a 69-year-old woman who has a 1-year history of low back and right leg pain.  Symptoms were predominantly in the right leg.  She had a multilevel lumbar decompression in the past in 12/2011, which was done at the L3-4 level, and a bilateral L2-L3 decompression done on 10/16/2017.  Her symptoms responded relatively well to the treatment, but then one year ago she developed recurrent symptoms.  Her preoperative imaging studies showed severe central stenosis at the L2-L3 and L3-L4 levels.  There were prior decompressions at each of these levels and at L4-L5 as well.  There was severe central stenosis at L2-L3 and L3-L4 with bilateral lateral recess stenosis at L4-L5.  Her symptoms were refractory to conservative treatments including physical therapy and epidural injections and therefore she elected for operative treatment.  Initially it was recommended to her that she have a revision decompression and instrumented fusion from L2 to the sacrum, but a preoperative CT scan was obtained that showed her quantitative bone density to be too low to accept pedicle screws because of the likelihood of hardware failure, junctional kyphosis, etc.  Therefore, it was recommended to her that she undergo the revision decompression and fusion without instrumentation and that no interbody fusion be attempted at the L4-L5 and L5-S1 levels.   She understood that the results of the surgery without instrumentation would likely not be as good as typical when a patient who has normal bone density and instrumentation can be used, but that the likelihood of significant postoperative complications requiring revision surgery was much greater than 50% if the instrumentation was used.      DESCRIPTION OF PROCEDURE:  After informed consent was obtained from the patient and her family and satisfactory general anesthesia achieved, the patient was turned to the prone position on the Cox frame.  Care was taken to pad the bony prominences.  Her back was prepped in the usual sterile fashion.  The pulses were palpable in her upper extremities after positioning.  In her lower extremities, she did not have palpable pulses, but had a 98% oxygen saturation when her toes were tested with a pulse oximeter.      After appropriate patient and site identification, her old surgical scar was used as a guide for a new incision.  The old scar was approximately 20 cm in length.  Marcaine 0.25% with epinephrine was infiltrated into the skin edges to aid with hemostasis and postoperative pain control.  Subcutaneous dissection continued with the Bovie down to lumbar fascia.  Lumbar fascia was divided over the L2 and L5 spinous processes.  Kocher clamps were placed on both the spinous processes and a lateral x-ray was obtained confirming position at L2 and L5.  The x-ray was read by Radiology who concurred.  The  dissection continued down the spinous processes of L2 through S1.  The inferior edge of L1 was also exposed.  The dissection continued out over the facet joints, taking care not to violate them at the L1-2 level.  They were violated at the other levels.  There were quite significant degenerative changes in the lower lumbar region.  The bone density was quite soft.  The dissection continued out over the transverse process of L2 through L5 and onto the sacral ala and sponges  were packed into the posterolateral gutters.  Attention was turned to the midline where the remaining spinous processes of L2, L3 and L5 were removed and the lamina was thinned with the Leksell rongeur.  All the bone removed with the decompression was cleaned of its soft tissue and placed through the bone mill for later use as graft.  Because there was not significant stenosis under the L4 lamina, this was left in place.  Entry was gained into the spinal canal at the L5-S1 interspace and midline laminectomy was performed.  There was severe lateral recess stenosis encountered bilaterally at L5-S1 and L4-5.  The inferior several millimeters of the L4 lamina was removed to accomplish a lateral recess decompression at L4-5, L5-S1 and foraminotomies at L5 and S1.  After the decompression, a Brenner elevator could be passed out along the path of both the L5 and S1 nerves and there was no evidence of any further tethering material.  There was a marked amount of bony hypertrophy at the L3-4 level and the Midas drill was used to thin the lamina on the superior edge of the L4 lamina and the inferior edge of L3.  There was essentially no space between the L3 and L4 lamina.  When the ligamentum flavum was initially encountered, it was dissected with an angled curet and then Kerrison rongeurs were used to perform a midline laminectomy up to the L1-2 interspace.  Again, there was marked central stenosis at the L2-L3 and L3-4 levels and there was a marked amount of epidural fibrosis at these levels as well.  This was gently teased from the lamina with a small angled curet and dental tools.  The majority of scar tissue was posterolaterally.  Out laterally there was significant lateral recess stenosis, but predominantly virgin tissue.  After the decompression, a Brenner a Brenner elevator could be passed out along the path of the L2, L3, and L4 nerves, and there was no evidence of any further tethering material.  The wound was  irrigated with copious amounts of antibiotic solution.  There was no compromise of the dura.  There was approximately 12 mL of the local autograft cleaned and ground and this was placed on the right side.  Only the local autograft was placed on the right side in the posterolateral gutter from L2 to the sacrum.  On the left side, the local autograft was placed at the L2-L3, L3-L4 and L5-S1 levels and 5 mL of demineralized bone matrix was placed on the left at the L4-5 level.  Bone and demineralized bone matrix was packed up against the pars area and was pushed down onto the transverse processes, all of which had been decorticated prior to placement of the bone graft.  The wound was then irrigated with copious amounts of antibiotic solution.  The patency of the foramen was checked and from L2 to the sacrum all were open and patent.  The exposed dura was covered with thrombin-soaked Gelfoam.  A 15 Frisian channel drain was placed in the epidural space and brought out to the right of the incision.  The lumbar muscle fascia was reapproximated with multiple figure-of-eight sutures of #1 Vicryl and a running stitch of #2 Stratafix.  Skin was closed with multiple subcutaneous sutures of 2-0 Vicryl and a running subcuticular stitch of 3-0 Vicryl.  Sterile Steri-Strips and dressing were applied.  The patient was turned from the Franky table, awakened, extubated, and left the operating room in good condition.      Estimated blood loss was 625 mL.  Replacement was 225 mL from the Cell Saver, 250 mL of albumin and 2600 of crystalloid and there were no complications.  Total time for the procedure from skin to skin was 285 minutes.       AMY Quintana's assistance was essential during the entire procedure for safe retraction of muscle and neural tissue, suctioning of blood for visualization and holding alignment during placement of the bone graft.    GABRIEL PINON MD             D: 08/06/2019   T: 08/06/2019   MT: SUNNY       Name:     LUIS CHAUDHARY   MRN:      [id number]        Account:        [id number]   :      1950           Procedure Date: 2019      Document: [id number]       cc: Raj Doan MD

## 2019-08-06 NOTE — PLAN OF CARE
PACU transfer 1600, VSS, except 4L 02, CMS intact except for right leg to foot numbness states better than it was. Pulses 2+, denies pain. Tolerating ice chips. Johnson-patent, RAJI bulb drained 75cc, bowel sounds hypoactive. Plan TBD.

## 2019-08-06 NOTE — ANESTHESIA POSTPROCEDURE EVALUATION
Patient: Leonor Bernstein    Procedure(s):  REVISION laminectomy L 2 -Sacrum ; POSTERIOR spinal FUSION L2 -Sacrum using local autograft    Diagnosis:DEGENERATIVE SCOLIOSIS ; RECURRENT SPINAL STENOSIS ; NEUROGENIC CLAUDICATION  Diagnosis Additional Information: No value filed.    Anesthesia Type:  General, ETT    Note:  Anesthesia Post Evaluation    Patient location during evaluation: PACU  Patient participation: Able to fully participate in evaluation  Level of consciousness: awake and alert  Pain management: adequate  Airway patency: patent  Cardiovascular status: acceptable and hemodynamically stable  Respiratory status: acceptable and unassisted  Hydration status: acceptable  PONV: none             Last vitals:  Vitals:    08/06/19 1420 08/06/19 1440 08/06/19 1510   BP: 136/77 (!) 148/81 128/73   Pulse: 69 75 68   Resp: 15 16 16   Temp:   36.7  C (98  F)   SpO2: 100% 97% 96%         Electronically Signed By: Fei Najera DO  August 6, 2019  3:25 PM

## 2019-08-06 NOTE — ANESTHESIA CARE TRANSFER NOTE
Patient: Leonor Bernstein    Procedure(s):  REVISION laminectomy L 2 -Sacrum ; POSTERIOR spinal FUSION L2 -Sacrum using local autograft    Diagnosis: DEGENERATIVE SCOLIOSIS ; RECURRENT SPINAL STENOSIS ; NEUROGENIC CLAUDICATION  Diagnosis Additional Information: No value filed.    Anesthesia Type:   General, ETT     Note:  Airway :Face Mask  Patient transferred to:PACU  Comments: Pt awake, moves toes bilaterally to command, squeezes hands to command.  VSS.O2 6L via face mask with sats 100%  Monitors on, report to RN.Handoff Report: Identifed the Patient, Identified the Reponsible Provider, Reviewed the pertinent medical history, Discussed the surgical course, Reviewed Intra-OP anesthesia mangement and issues during anesthesia, Set expectations for post-procedure period and Allowed opportunity for questions and acknowledgement of understanding      Vitals: (Last set prior to Anesthesia Care Transfer)    CRNA VITALS  8/6/2019 1332 - 8/6/2019 1409      8/6/2019             NIBP:  97/55    Pulse:  74    NIBP Mean:  74    ART BP:  148/83    ART Mean:  110    SpO2:  100 %    Resp Rate (set):  10                Electronically Signed By: JACQUE Koroma CRNA  August 6, 2019  2:09 PM

## 2019-08-06 NOTE — BRIEF OP NOTE
[facility]    Brief Operative Note    Pre-operative diagnosis: DEGENERATIVE SCOLIOSIS ; RECURRENT SPINAL STENOSIS ; NEUROGENIC CLAUDICATION  Post-operative diagnosis same  Procedure: Revision decompression of bilateral L2, L3, L4, L5 and S1 nerves.  Posterior spinal fusion from L2-S1 using local autograft and demineralized bone matrix.  Surgeon: Surgeon(s) and Role:     * Raj Doan MD - Primary     * Delilah Howell PA-C - Assisting  Anesthesia: General   Estimated blood loss: 625 mL  Drains: Franky-Ghotra  Specimens: * No specimens in log *  Findings:   Severe central stenosis and epidural fibrosis at the L2-3 and L3-4 levels, severe bilateral lateral recess stenosis at the L4-5 level. Marked osteoporosis.  Complications: None.  Implants:    Implant Name Type Inv. Item Serial No.  Lot No. LRB No. Used   GRAFT BONE PUTTY DBM MX [id number] Bone/Tissue/Biologic GRAFT BONE PUTTY DBM MX [id number] MUSCULOSKELETAL ARELLANO  N/A 1

## 2019-08-06 NOTE — PROGRESS NOTES
Admission medication history interview status for the 8/6/2019  admission is complete. See EPIC admission navigator for prior to admission medications     Medication history source reliability:Good    Medication history interview source(s):Patient    Medication history resources (including written lists, pill bottles, clinic record):None    [facility] pharmacy[facility]    Additional medication history information not noted on PTA med list :None    Time spent in this activity: 45 minutes    Prior to Admission medications    Medication Sig Last Dose Taking? Auth Provider   amLODIPine (NORVASC) 5 MG tablet Take 5 mg by mouth daily 8/6/2019 at 0415 Yes Reported, Patient   buPROPion (WELLBUTRIN XL) 150 MG 24 hr tablet Take 150 mg by mouth every morning 8/6/2019 at 0415 Yes Reported, Patient   clindamycin (CLEOCIN) 300 MG capsule Take 600 mg by mouth daily as needed (1 hour before dental procedure) (Takes 2 x 300mg tablet = 600mg dose)  more than a month at prn Yes Reported, Patient   cyanocobalamin (CYANOCOBALAMIN) 1000 MCG/ML injection Inject 1 mL into the muscle every 21 days  8/5/2019 at Unknown time Yes Reported, Patient   diphenhydrAMINE-acetaminophen (TYLENOL PM)  MG tablet Take 2 tablets by mouth nightly as needed for sleep 8/5/2019 at pm Yes Reported, Patient   ergocalciferol (ERGOCALCIFEROL) 61861 UNIT capsule Take 50,000 Units by mouth Every Mon, Wed, Fri Morning  8/5/2019 at am Yes Reported, Patient   FLUoxetine 20 MG tablet Take 60 mg by mouth daily (Takes 3 x 20mg tablet = 60mg dose) 8/6/2019 at 0415 Yes Reported, Patient   LORazepam (ATIVAN) 1 MG tablet Take 1 mg by mouth At Bedtime  8/5/2019 at pm Yes Reported, Patient   Naproxen Sod-Diphenhydramine (ALEVE PM PO) Take 2 tablets by mouth At Bedtime  more than a week Yes Reported, Patient

## 2019-08-07 ENCOUNTER — APPOINTMENT (OUTPATIENT)
Dept: PHYSICAL THERAPY | Facility: CLINIC | Age: 69
DRG: 457 | End: 2019-08-07
Attending: ORTHOPAEDIC SURGERY
Payer: COMMERCIAL

## 2019-08-07 ENCOUNTER — DOCUMENTATION ONLY (OUTPATIENT)
Dept: OTHER | Facility: CLINIC | Age: 69
End: 2019-08-07

## 2019-08-07 ENCOUNTER — AMBULATORY - HEALTHEAST (OUTPATIENT)
Dept: OTHER | Facility: CLINIC | Age: 69
End: 2019-08-07

## 2019-08-07 LAB
ANION GAP SERPL CALCULATED.3IONS-SCNC: 7 MMOL/L (ref 3–14)
BLD PROD TYP BPU: NORMAL
BLD PROD TYP BPU: NORMAL
BLD UNIT ID BPU: 0
BLD UNIT ID BPU: 0
BLOOD PRODUCT CODE: NORMAL
BLOOD PRODUCT CODE: NORMAL
BPU ID: NORMAL
BPU ID: NORMAL
BUN SERPL-MCNC: 16 MG/DL (ref 7–30)
CA-I SERPL ISE-MCNC: 4.1 MG/DL (ref 4.4–5.2)
CALCIUM SERPL-MCNC: 7.4 MG/DL (ref 8.5–10.1)
CHLORIDE SERPL-SCNC: 111 MMOL/L (ref 94–109)
CO2 SERPL-SCNC: 22 MMOL/L (ref 20–32)
CREAT SERPL-MCNC: 1.15 MG/DL (ref 0.52–1.04)
GFR SERPL CREATININE-BSD FRML MDRD: 49 ML/MIN/{1.73_M2}
GLUCOSE SERPL-MCNC: 121 MG/DL (ref 70–99)
HGB BLD-MCNC: 10 G/DL (ref 11.7–15.7)
HGB BLD-MCNC: 10.1 G/DL (ref 11.7–15.7)
POTASSIUM SERPL-SCNC: 4.5 MMOL/L (ref 3.4–5.3)
SODIUM SERPL-SCNC: 140 MMOL/L (ref 133–144)
TRANSFUSION STATUS PATIENT QL: NORMAL
WBC # BLD AUTO: 10.5 10E9/L (ref 4–11)

## 2019-08-07 PROCEDURE — 85018 HEMOGLOBIN: CPT | Performed by: PHYSICIAN ASSISTANT

## 2019-08-07 PROCEDURE — 99232 SBSQ HOSP IP/OBS MODERATE 35: CPT | Performed by: PHYSICIAN ASSISTANT

## 2019-08-07 PROCEDURE — 36415 COLL VENOUS BLD VENIPUNCTURE: CPT | Performed by: PHYSICIAN ASSISTANT

## 2019-08-07 PROCEDURE — 25000128 H RX IP 250 OP 636: Performed by: ORTHOPAEDIC SURGERY

## 2019-08-07 PROCEDURE — 85048 AUTOMATED LEUKOCYTE COUNT: CPT | Performed by: ORTHOPAEDIC SURGERY

## 2019-08-07 PROCEDURE — 82330 ASSAY OF CALCIUM: CPT | Performed by: PHYSICIAN ASSISTANT

## 2019-08-07 PROCEDURE — 25000132 ZZH RX MED GY IP 250 OP 250 PS 637: Performed by: ORTHOPAEDIC SURGERY

## 2019-08-07 PROCEDURE — 97161 PT EVAL LOW COMPLEX 20 MIN: CPT | Mod: GP

## 2019-08-07 PROCEDURE — 25000128 H RX IP 250 OP 636: Performed by: PHYSICIAN ASSISTANT

## 2019-08-07 PROCEDURE — 82947 ASSAY GLUCOSE BLOOD QUANT: CPT | Performed by: ORTHOPAEDIC SURGERY

## 2019-08-07 PROCEDURE — 80048 BASIC METABOLIC PNL TOTAL CA: CPT | Performed by: ORTHOPAEDIC SURGERY

## 2019-08-07 PROCEDURE — 36415 COLL VENOUS BLD VENIPUNCTURE: CPT | Performed by: ORTHOPAEDIC SURGERY

## 2019-08-07 PROCEDURE — 85018 HEMOGLOBIN: CPT | Performed by: ORTHOPAEDIC SURGERY

## 2019-08-07 PROCEDURE — 97530 THERAPEUTIC ACTIVITIES: CPT | Mod: GP

## 2019-08-07 PROCEDURE — 25800030 ZZH RX IP 258 OP 636: Performed by: PHYSICIAN ASSISTANT

## 2019-08-07 PROCEDURE — 12000000 ZZH R&B MED SURG/OB

## 2019-08-07 RX ORDER — HYDROMORPHONE HYDROCHLORIDE 2 MG/1
2-4 TABLET ORAL EVERY 4 HOURS PRN
Status: DISCONTINUED | OUTPATIENT
Start: 2019-08-07 | End: 2019-08-08

## 2019-08-07 RX ORDER — OXYCODONE HYDROCHLORIDE 5 MG/1
5-10 TABLET ORAL
Status: DISCONTINUED | OUTPATIENT
Start: 2019-08-07 | End: 2019-08-07

## 2019-08-07 RX ORDER — SODIUM CHLORIDE 9 MG/ML
INJECTION, SOLUTION INTRAVENOUS CONTINUOUS
Status: DISCONTINUED | OUTPATIENT
Start: 2019-08-07 | End: 2019-08-09

## 2019-08-07 RX ADMIN — HYDROMORPHONE HYDROCHLORIDE 0.2 MG: 1 INJECTION, SOLUTION INTRAMUSCULAR; INTRAVENOUS; SUBCUTANEOUS at 07:38

## 2019-08-07 RX ADMIN — FLUOXETINE 60 MG: 20 CAPSULE ORAL at 08:41

## 2019-08-07 RX ADMIN — ERGOCALCIFEROL 50000 UNITS: 1.25 CAPSULE, LIQUID FILLED ORAL at 08:43

## 2019-08-07 RX ADMIN — CHOLECALCIFEROL CAP 125 MCG (5000 UNIT) 10000 UNITS: 125 CAP at 08:41

## 2019-08-07 RX ADMIN — SODIUM CHLORIDE: 9 INJECTION, SOLUTION INTRAVENOUS at 10:19

## 2019-08-07 RX ADMIN — SENNOSIDES AND DOCUSATE SODIUM 1 TABLET: 8.6; 5 TABLET ORAL at 21:03

## 2019-08-07 RX ADMIN — ACETAMINOPHEN 975 MG: 325 TABLET, FILM COATED ORAL at 15:52

## 2019-08-07 RX ADMIN — SENNOSIDES AND DOCUSATE SODIUM 1 TABLET: 8.6; 5 TABLET ORAL at 08:42

## 2019-08-07 RX ADMIN — SODIUM CHLORIDE, POTASSIUM CHLORIDE, SODIUM LACTATE AND CALCIUM CHLORIDE 500 ML: 600; 310; 30; 20 INJECTION, SOLUTION INTRAVENOUS at 15:53

## 2019-08-07 RX ADMIN — HYDROMORPHONE HYDROCHLORIDE 0.2 MG: 1 INJECTION, SOLUTION INTRAMUSCULAR; INTRAVENOUS; SUBCUTANEOUS at 21:02

## 2019-08-07 RX ADMIN — HYDROMORPHONE HYDROCHLORIDE 0.2 MG: 1 INJECTION, SOLUTION INTRAMUSCULAR; INTRAVENOUS; SUBCUTANEOUS at 02:03

## 2019-08-07 RX ADMIN — LORAZEPAM 1 MG: 1 TABLET ORAL at 21:03

## 2019-08-07 RX ADMIN — BUPROPION HYDROCHLORIDE 150 MG: 150 TABLET, FILM COATED, EXTENDED RELEASE ORAL at 08:42

## 2019-08-07 RX ADMIN — HYDROMORPHONE HYDROCHLORIDE 0.2 MG: 1 INJECTION, SOLUTION INTRAMUSCULAR; INTRAVENOUS; SUBCUTANEOUS at 04:37

## 2019-08-07 RX ADMIN — CALCIUM GLUCONATE 1 G: 98 INJECTION, SOLUTION INTRAVENOUS at 17:03

## 2019-08-07 RX ADMIN — MAGNESIUM HYDROXIDE 30 ML: 400 SUSPENSION ORAL at 17:03

## 2019-08-07 RX ADMIN — ACETAMINOPHEN 975 MG: 325 TABLET, FILM COATED ORAL at 21:02

## 2019-08-07 RX ADMIN — HYDROMORPHONE HYDROCHLORIDE 4 MG: 2 TABLET ORAL at 13:11

## 2019-08-07 RX ADMIN — ACETAMINOPHEN 975 MG: 325 TABLET, FILM COATED ORAL at 06:08

## 2019-08-07 RX ADMIN — SODIUM CHLORIDE: 9 INJECTION, SOLUTION INTRAVENOUS at 21:21

## 2019-08-07 RX ADMIN — OXYCODONE HYDROCHLORIDE 5 MG: 5 TABLET ORAL at 09:57

## 2019-08-07 RX ADMIN — OXYCODONE HYDROCHLORIDE 5 MG: 5 TABLET ORAL at 03:27

## 2019-08-07 RX ADMIN — CEFAZOLIN 1 G: 1 INJECTION, POWDER, FOR SOLUTION INTRAMUSCULAR; INTRAVENOUS at 04:38

## 2019-08-07 RX ADMIN — POTASSIUM CHLORIDE AND SODIUM CHLORIDE: 900; 150 INJECTION, SOLUTION INTRAVENOUS at 03:28

## 2019-08-07 RX ADMIN — HYDROMORPHONE HYDROCHLORIDE 4 MG: 2 TABLET ORAL at 17:04

## 2019-08-07 ASSESSMENT — ACTIVITIES OF DAILY LIVING (ADL)
ADLS_ACUITY_SCORE: 12
ADLS_ACUITY_SCORE: 12
ADLS_ACUITY_SCORE: 14
ADLS_ACUITY_SCORE: 14
ADLS_ACUITY_SCORE: 12
ADLS_ACUITY_SCORE: 14

## 2019-08-07 NOTE — PROVIDER NOTIFICATION
MD Notification    Notified Person: MD    Notified Person Name: DULCE Stover    Notification Date/Time: 8/7/19 1463     Notification Interaction: text page    Purpose of Notification: Hi Betina, just wanted to let you know that pt's hgb recheck came back at 10.0 and also her urine output is only 225 for 8 hours-28/hr.     Orders Received: Update: bolus order recieved    Comments:

## 2019-08-07 NOTE — PROVIDER NOTIFICATION
MD Notification    Notified Person: MD Doan    Notified Person Name:    Notification Date/Time: 8/7/19 1706     Notification Interaction: web page    Purpose of Notification:     Just an FYI:  Physical therapy just spoke with me in regards to his session with pt and pt reports new numbness in left foot. Completed a neuro/CMS check- right lower extremity weaker than right with numbness and left lower is strong with new numbness in foot.     Orders Received:    Comments:

## 2019-08-07 NOTE — PROVIDER NOTIFICATION
Pt has been receiving IV dilaudid for breakthrough pain - TORB received to increase prn oxycodone to 5-10 mg q 3 hrs prn. Order placed and bedside RN notified.

## 2019-08-07 NOTE — PROGRESS NOTES
[facility]    Medicine Progress Note - Hospitalist Service       Date of Admission:  8/6/2019  Assessment & Plan   Leonor Bernstein is a pleasant 69-year-old female with a past medical history of hypertension, GERD, depressive disorder, anemia, osteoporosis and sleep apnea who underwent a planned revision L2-S1 decompression and fusion on 08/06/2019.  The Hospitalist Service was consulted for routine postoperative medical co-management.      Revision decompression of bilateral L2-S1 and posterior spinal fusion L2-S1 - 8/6/19   The patient is doing well.  Denies any pain currently.  She is reporting increased numbness in the right lower extremity from the knee down to the foot.  Her numbness was present prior to surgery but is now more pronounced.    - Will monitor numbness  - Defer routine postoperative cares to the Orthopedic Spine Surgery team   - Add on ionized calcium, replete if low  - PT/OT consulted per primary service  - Plan for rehab on Friday 8/9/19  - suggest Dilaudid PO over oxycodone due to renal dysfunction     Acute blood loss anemia, worsening    Preop Hgb 12.9 and postop 11.4 -->10.1.  mL  - ~500ml output from RAJI drain since surger = ~1125mL total blood loss between EBL + RAJI drain  - Likely also a dilutional factor from IV fluids    - Repeat hemoglobin daily and again today at 1400    Recent Labs   Lab 08/07/19  0623 08/06/19  1628   HGB 10.1* 11.4*     Recent acute kidney injury     Seen in Care Everywhere.  Per pt, it was attributed to increased naproxen use which she stopped using.   Cr Trend: 6/3 1.28 --> 7/1 1.44--> 7/29 1.65--> 8/5 1.34--> 0.98-->1.15  Previous baseline up until 4/2019 appears to be 0.9-1    - Would avoid NSAIDs going forward  - avoid nephrotoxins  - bump in Cr today from 0.98-->1.15, recheck in AM  - continue on gentle IVF as per surgical order, adjust to remove the potassium as 4.5 today  - suggest Dilaudid PO over oxycodone due to renal  dysfunction    Mild leukocytosis, resolved  WBC is mildly elevated at 13.3.  The patient is afebrile.  No infectious symptoms.  This is likely stress response from Surgery and resolved POD #1 to 10.5.     Hypertension.    The patient was recently taken off losartan placed on amlodipine 5 mg daily. Normotensive.   - Continue PTA amlodipine with hold parameters      Obstructive sleep apnea, not on CPAP    The patient does not tolerate CPAP.    - Recommend continuous pulse oximetry, capnography during this hospitalization.   - Weaned off O2     Depressive disorder.    - Resume PTA Wellbutrin and fluoxetine.    - Ativan available p.r.n.     Osteoporosis and vitamin D deficiency.    - Resume prior to admission vitamin D supplement, ortho increasing     Gastroesophageal reflux disease.  The patient is not on a PPI.     Diet: Advance Diet as Tolerated: Regular Diet Adult    DVT Prophylaxis: Defer to primary service  Johnson Catheter: in place, indication: Anesthesia  Code Status: Full Code      Disposition Plan   Expected discharge: 2 - 3 days, recommended to transitional care unit once adequate pain management/ tolerating PO medications, hemoglobin stable, renal function improved and safe disposition plan/ TCU bed available.  Entered: Betina Stover PA-C 08/07/2019, 10:13 AM       The patient's care was discussed with the Bedside Nurse and Patient.    Betina Stover PA-C  Hospitalist Service  [facility]    ______________________________________________________________________    Interval History   Overall feeling well. No N/V. C/o pain which is relieved with oxy. No lightheadedness or dizziness. No SOB. BPs soft, held CCB this AM. Increased numbness to Rt foot, ortho aware.    Data reviewed today: I reviewed all medications, new labs and imaging results over the last 24 hours. I personally reviewed no images or EKG's today.    Physical Exam   Vital Signs: Temp: 98  F (36.7  C) Temp src: Oral BP:  102/48 Pulse: 68 Heart Rate: 84 Resp: 24 SpO2: 93 % O2 Device: Nasal cannula Oxygen Delivery: 1 LPM  Weight: 223 lbs 0 oz    General: Awake, alert,  woman who appears stated age. Looks comfortable sitting up in bed. No acute distress.  HEENT: Normocephalic, atraumatic. Extraocular movements intact.    Respiratory: Clear to auscultation bilaterally, no rales, wheezing, or rhonchi to anterolateral fields.  Cardiovascular: Regular rate and rhythm, +S1 and S2, no murmur auscultated. No peripheral edema.   Gastrointestinal: Soft, non-tender, non-distended. Bowel sounds present. Rt sided RAJI drain with ~25ml bloody drainage noted.  Skin: Warm, dry. No obvious rashes or lesions on exposed skin.   Musculoskeletal: No joint swelling, erythema or tenderness. Moves all extremities equally.  Neurologic: AAO x3. Cranial nerves 2-12 grossly intact, normal strength and sensation.  Psychiatric: Appropriate mood and affect. No obvious anxiety or depression.    Data   Recent Labs   Lab 08/07/19  0623 08/06/19  1628   WBC 10.5 13.3*   HGB 10.1* 11.4*   MCV  --  95   PLT  --  236    142   POTASSIUM 4.5 4.0   CHLORIDE 111* 112*   CO2 22 25   BUN 16 18   CR 1.15* 0.98   ANIONGAP 7 5   VICENTE 7.4* 7.6*   * 116*     No results found for this or any previous visit (from the past 24 hour(s)).  Medications     No Medication Sleep Aids for this Patient       sodium chloride         acetaminophen  975 mg Oral Q8H     amLODIPine  5 mg Oral Daily     buPROPion  150 mg Oral QAM     cholecalciferol  10,000 Units Oral Daily     FLUoxetine  60 mg Oral Daily     LORazepam  1 mg Oral At Bedtime     senna-docusate  1 tablet Oral BID    Or     senna-docusate  2 tablet Oral BID     sodium chloride (PF)  3 mL Intracatheter Q8H     vitamin D2  50,000 Units Oral Q Mon Wed Fri AM

## 2019-08-07 NOTE — CONSULTS
Consult Date:  08/06/2019      PRIMARY CARE PHYSICIAN:  Mini Hayden MD.      REQUESTING PHYSICIAN:  Dr. Raj Doan of Orthopedic Surgery.      REASON FOR CONSULTATION:  Postoperative medical management of issues including hypertension.      HISTORY OF PRESENT ILLNESS:  Leonor Bernstein is a pleasant 69-year-old female with a past medical history of hypertension, GERD, depressive disorder, anemia, sleep apnea and vitamin D deficiency who underwent a planned revision decompression of L2-S1 with fusion on 08/06/2019.  This was for recurrent lumbar spinal stenosis with neurogenic claudication.  Procedure was performed under general anesthesia.  It is without any immediate postoperative complications.  Estimated blood loss 625 mL.  The patient was subsequently admitted to the Neurosurgery floor for further postoperative monitoring and cares.      The patient is resting comfortably in bed on my arrival.  She denies any pain.  She is reporting right lower extremity numbness from the knee down, which is more pronounced than prior to surgery.  She denies any weakness.  Denies any recent fevers, chills, flu-like symptoms, headache, lightheadedness, chest pain, shortness of breath, abdominal pain, nausea, vomiting, diarrhea, dysuria.  In review of the patient's chart, the patient was recently taken off of losartan and switched to amlodipine.  She states this change was made as the losartan was not controlling her blood pressure.  She is also noted to have a recent creatinine elevation which was attributed to naproxen use, which she has since discontinued.  Her vital signs are currently stable.  Lab work from today shows a BMP with potassium 4.0, chloride 112, creatinine 0.98, glucose 116.  Otherwise, a CBC showing WBC 13.3 with hemoglobin 11.4, platelet count 236.  Her EKG is reviewed, shows sinus rhythm with nonspecific T-wave abnormality.  No previous EKG available for comparison.  The patient otherwise voices no  concerns.      PAST MEDICAL HISTORY:   1.  Allergic rhinitis.   2.  Iron deficiency anemia.   3.  Osteoarthritis.   4.  Depressive disorder.   5.  GERD.   6.  Hemorrhoids.   7.  Hypertension.   8.  Obesity.   9.  Osteoporosis.   10.  Restless leg syndrome.   11.  Right lower extremity sciatica.   12.  Sleep apnea, does not tolerate CPAP.   13.  Vitamin D deficiency.      PAST SURGICAL HISTORY:    Past Surgical History:   Procedure Laterality Date     CHOLECYSTECTOMY       COLONOSCOPY       DECOMPRESSION LUMBAR TWO LEVELS Bilateral 10/16/2017    Procedure: DECOMPRESSION LUMBAR TWO LEVELS;  BILATERAL L2-3 DECOMPRESSION ;  Surgeon: Andrea Keating MD;  Location: [facility] OR     GASTRIC BYPASS  1979     GI SURGERY      gastric bypass     GYN SURGERY      ovarian cystectomy     GYN SURGERY  1986    tubal lig     LAMINECT/DISCECTOMY, LUMBAR  1970     LAMINECTOMY LUMBAR ONE LEVEL  12/19/2011    Procedure:LAMINECTOMY LUMBAR ONE LEVEL; BILATERAL L3-4 DECOMPRESSION ; Surgeon:ANDRAE KEATING; Location: OR     ORTHOPEDIC SURGERY  2009     right total hip       FAMILY HISTORY:  This was reviewed with the patient and noncontributory to this presentation.      SOCIAL HISTORY:  The patient is a former smoker and quit in 1973.  She has a 2-pack-year history.  She drinks alcohol seldomly.  No illicit drug use.      PRIOR TO ADMISSION MEDICATIONS:    Medications Prior to Admission   Medication Sig Dispense Refill Last Dose     amLODIPine (NORVASC) 5 MG tablet Take 5 mg by mouth daily   8/6/2019 at 0415     buPROPion (WELLBUTRIN XL) 150 MG 24 hr tablet Take 150 mg by mouth every morning   8/6/2019 at 0415     clindamycin (CLEOCIN) 300 MG capsule Take 600 mg by mouth daily as needed (1 hour before dental procedure) (Takes 2 x 300mg tablet = 600mg dose)    more than a month at prn     cyanocobalamin (CYANOCOBALAMIN) 1000 MCG/ML injection Inject 1 mL into the muscle every 21 days    8/5/2019 at Unknown time      diphenhydrAMINE-acetaminophen (TYLENOL PM)  MG tablet Take 2 tablets by mouth nightly as needed for sleep   8/5/2019 at pm     ergocalciferol (ERGOCALCIFEROL) 06634 UNIT capsule Take 50,000 Units by mouth Every Mon, Wed, Fri Morning    8/5/2019 at am     FLUoxetine 20 MG tablet Take 60 mg by mouth daily (Takes 3 x 20mg tablet = 60mg dose)   8/6/2019 at 0415     LORazepam (ATIVAN) 1 MG tablet Take 1 mg by mouth At Bedtime    8/5/2019 at pm     Naproxen Sod-Diphenhydramine (ALEVE PM PO) Take 2 tablets by mouth At Bedtime    more than a week     ALLERGIES:  AMBIEN AND PENICILLINS.      REVIEW OF SYSTEMS:  A complete 10-point review of systems was performed and is negative other than the items previously mentioned above in the HPI.      PHYSICAL EXAMINATION:   VITAL SIGNS:  Blood pressure 115/54, heart rate 66 beats per minute, temperature 97.3, respiratory rate 16, oxygen saturation 95% on 4 liters of oxygen.   GENERAL:  The patient is alert, oriented to person, place and situation, cooperative, lying in bed, no apparent distress.   HEENT:  Pupils equal and round.  Extraocular movements grossly intact.  Head normocephalic.  Throat, lips, mucosa and tongue appear moist.   NECK:  Supple, no cervical adenopathy.   CARDIOVASCULAR:  Heart regular rate and rhythm, no murmurs, rubs or gallops.  Distal pulses are intact.  No edema.   PULMONARY:  Lungs clear to auscultation bilaterally, no crackles, wheezes or rhonchi.  Breathing is nonlabored   GASTROINTESTINAL:  Abdomen soft, nontender, nondistended with hypoactive bowel sounds.   MUSCULOSKELETAL:  The patient moves all 4 extremities equally with normal strength.   NEUROLOGIC:  Alert, cranial nerves II-XII are grossly intact.  Motor function is intact with strength 5/5 in all 4 extremities.  Her right lower extremity has decreased sensation from the knee down to the foot.  No other sensory changes in the remainder of her extremities.   SKIN:  Warm, dry, nondiaphoretic.    PSYCHIATRIC:  Normal mood and affect.      LABORATORY DATA:  BMP:  Potassium 4.0, chloride 112, calcium 7.6, creatinine 0.98, glucose 116, otherwise within normal limits.  CBC:  WBC 13.3, hemoglobin 11.4, hematocrit 35.9, platelet count 236, RBC 3.78, otherwise within normal limits.      EKG:  Personally reviewed.  Sinus rhythm with nonspecific T-wave abnormality.      ASSESSMENT AND PLAN:  Leonor Bernstein is a pleasant 69-year-old female with a past medical history of hypertension, GERD, depressive disorder, anemia, osteoporosis and sleep apnea who underwent a planned revision L2-S1 decompression and fusion on 08/06/2019.  The Hospitalist Service was consulted for routine postoperative medical co-management.     1.  Revision decompression of bilateral L2-S1 and posterior spinal fusion L2-S1, postoperative day #0.  The patient is doing well.  Denies any pain currently.  She is reporting increased numbness in the right lower extremity from the knee down to the foot.  Her numbness was present prior to surgery but is now more pronounced.  Will monitor.  Defer routine postoperative cares to the Orthopedic Spine Surgery team.     2.  Acute blood loss anemia.  Preoperative hemoglobin was 12.9.  Hemoglobin postoperatively is 11.4.  Estimated blood loss is 625 mL.  Likely also a dilutional factor from IV fluids.  Repeat hemoglobin in a.m.     3.  Mild leukocytosis.  WBC is mildly elevated at 13.3.  The patient is afebrile.  No infectious symptoms.  This is likely stress response from Surgery.  Will repeat WBC in the morning.     4.  Hypertension.  The patient was recently taken off losartan placed on amlodipine 5 mg daily.  Blood pressures are currently controlled.  Continue amlodipine with hold parameters.     5.  Recent acute kidney injury.  This is as seen in Care Everywhere.  Per the patient, it was attributed to increased naproxen use.  She has subsequently stopped using naproxen and creatinine today is acceptable  at 0.98.  Would avoid NSAIDs going forward.     6.  Obstructive sleep apnea.  The patient does not tolerate CPAP.  Recommend continuous pulse oximetry, capnography during this hospitalization.  Wean oxygen as able.     7.  Depressive disorder.  Resume prior to admission Wellbutrin and fluoxetine.  She has Ativan available p.r.n.     8.  Osteoporosis and vitamin D deficiency.  Resume prior to admission vitamin D supplement.     9.  Gastroesophageal reflux disease.  The patient is not on a PPI.     10.  Deep venous thrombosis prophylaxis per Orthopedic Surgery.     11.  CODE STATUS:  Full code.     12.  Disposition:  Per Ortho.      This patient was discussed with Dr. Gabriele Baxter of the [facility] Hospitalist Service.  He is in agreement with my assessment and plan of care.      The Hospitalist Service would like to thank Dr. Doan for consulting us.  We will continue to follow.         GABRIELE BAXTER MD       As dictated by BEATA OROZCO PA-C            D: 2019   T: 2019   MT: WT      Name:     LUIS CHAUDHARY   MRN:      [id number]        Account:       [id number]   :      1950           Consult Date:  2019      Document: [id number]       cc: Raj Hayden MD

## 2019-08-07 NOTE — PROVIDER NOTIFICATION
MD Notification    Notified Person: MD Doan    Notified Person Name:    Notification Date/Time: 8/7/19 9578    Notification Interaction: web page    Purpose of Notification:   In regards to your order to pull the martinez on POD #1-she has been having low urine output and the hospitalist has ordered a bolus, would you like me to leave the martinez in to monitor output. Also, patient has been unable to get out of bed due to nausea, lightheadedness, dizziness, and right leg weakness. Her dressing has drainage on it. Per your dressing order to contact you for further instructions-please advise.    Orders Received:    Comments:

## 2019-08-07 NOTE — PLAN OF CARE
POD #1 from a L2-sacrum lami revision and L2-sacrum posterior fusion by Dr. Doan. A&Ox4. CMS right lower leg numbness-has a baseline of numbness but has increased since surgery and provider aware. New left foot numbness-provider notified. Bowel sounds audible, not passing flatus, martinez patent-low urine output, provider notified and bolus ordered. No appetite-tolerating clears and apple sauce. VS-O2 de-sat'ed on RA, placed NC 1.5L O2 93-94%, and BP soft. Dressing marked drainage-awaiting orders from Dr. Doan per his dressing order. RAJI drain patent and stripped every four hours. Pt became lightheaded, dizzy, and nauseous when trying to get out of bed. C/o 8/10 back pain, decreased with PO dilaudid 4 mg x2, ice applied, and position changes. Plan possible transfer to TCU on 8/9.

## 2019-08-07 NOTE — PROGRESS NOTES
History:   Minimal complaints.  Patient is numbness in her right foot is increased from preop.  Pain is managed on oral medication.  Tolerating clear liquid diet without difficulty.  Denies abdominal discomfort but no flatus and has not been out of bed yet.  Vitals:   B/P: 99/56, T: 99.6, P: 68, R: 20       Lab Results   Component Value Date     08/07/2019     08/06/2019     06/03/2019    Lab Results   Component Value Date    CHLORIDE 111 08/07/2019    CHLORIDE 112 08/06/2019    CHLORIDE 106 06/03/2019    Lab Results   Component Value Date    BUN 16 08/07/2019    BUN 18 08/06/2019    BUN 20 06/03/2019      Lab Results   Component Value Date    POTASSIUM 4.5 08/07/2019    POTASSIUM 4.0 08/06/2019    POTASSIUM 3.4 06/03/2019    Lab Results   Component Value Date    CO2 22 08/07/2019    CO2 25 08/06/2019    CO2 28 06/03/2019    Lab Results   Component Value Date    CR 1.15 08/07/2019    CR 0.98 08/06/2019    CR 1.28 06/03/2019        Lab Results   Component Value Date    WBC 10.5 08/07/2019    WBC 13.3 (H) 08/06/2019    WBC 7.8 06/03/2019    HGB 10.1 (L) 08/07/2019    HGB 11.4 (L) 08/06/2019    HGB 13.3 06/03/2019    HCT 35.9 08/06/2019    HCT 39.8 06/03/2019    MCV 95 08/06/2019    MCV 91 06/03/2019     08/06/2019     06/03/2019         Intake/Output Summary (Last 24 hours) at 8/7/2019 0808  Last data filed at 8/7/2019 0609  Gross per 24 hour   Intake 5256 ml   Output 2055 ml   Net 3201 ml      Results for orders placed or performed during the hospital encounter of 08/06/19 (from the past 24 hour(s))   XR Lumbar Spine Port 1 View    Narrative    LUMBAR SPINE PORTABLE ONE VIEW   8/6/2019 9:10 AM     HISTORY: Intra-op.    COMPARISON: None.      Impression    IMPRESSION: There are surgical instruments overlying the spinous  process of L5 and L2 assuming 5 lumbar type vertebra.    IMAN JOY MD   Basic metabolic panel   Result Value Ref Range    Sodium 142 133 - 144 mmol/L    Potassium  4.0 3.4 - 5.3 mmol/L    Chloride 112 (H) 94 - 109 mmol/L    Carbon Dioxide 25 20 - 32 mmol/L    Anion Gap 5 3 - 14 mmol/L    Glucose 116 (H) 70 - 99 mg/dL    Urea Nitrogen 18 7 - 30 mg/dL    Creatinine 0.98 0.52 - 1.04 mg/dL    GFR Estimate 59 (L) >60 mL/min/[1.73_m2]    GFR Estimate If Black 68 >60 mL/min/[1.73_m2]    Calcium 7.6 (L) 8.5 - 10.1 mg/dL   CBC with platelets   Result Value Ref Range    WBC 13.3 (H) 4.0 - 11.0 10e9/L    RBC Count 3.78 (L) 3.8 - 5.2 10e12/L    Hemoglobin 11.4 (L) 11.7 - 15.7 g/dL    Hematocrit 35.9 35.0 - 47.0 %    MCV 95 78 - 100 fl    MCH 30.2 26.5 - 33.0 pg    MCHC 31.8 31.5 - 36.5 g/dL    RDW 14.9 10.0 - 15.0 %    Platelet Count 236 150 - 450 10e9/L   Hemoglobin   Result Value Ref Range    Hemoglobin 10.1 (L) 11.7 - 15.7 g/dL   Basic metabolic panel   Result Value Ref Range    Sodium 140 133 - 144 mmol/L    Potassium 4.5 3.4 - 5.3 mmol/L    Chloride 111 (H) 94 - 109 mmol/L    Carbon Dioxide 22 20 - 32 mmol/L    Anion Gap 7 3 - 14 mmol/L    Glucose 121 (H) 70 - 99 mg/dL    Urea Nitrogen 16 7 - 30 mg/dL    Creatinine 1.15 (H) 0.52 - 1.04 mg/dL    GFR Estimate 49 (L) >60 mL/min/[1.73_m2]    GFR Estimate If Black 56 (L) >60 mL/min/[1.73_m2]    Calcium 7.4 (L) 8.5 - 10.1 mg/dL   WBC count   Result Value Ref Range    WBC 10.5 4.0 - 11.0 10e9/L     Hemovac output: 180 cc since midnight 465 cc total since surgery., .  Dressing:   Dry and intact.   Neuro:   Motor: 5/5   Sensation: Decreased to light touch in an L5 and S1 distribution on the right.  Abdomen:  Soft and nontender, bowel sounds active by report  Extremities:   No swelling or calf tenderness, Homans sign is negative  A/P:   Stable POD # 1   Mobilize today.  Plan transfer to rehab on Friday.  Vitamin D3 level was 32.  We will start her on 10,000 IU of vitamin D3 in addition to her 50,000 IUs of vitamin D2 3 times a week.

## 2019-08-07 NOTE — PLAN OF CARE
Discharge Planner PT     PT: Orders received, eval completed, tx initiated.  Pt lives in a multi-floor home alone. At baseline ambulates IND, works in HR.    Patient plan for discharge: TCU on Friday  Current status: Educated on spinal precautions. Increasing R LE numbness today, L2-S1. Transfers: supine<>sit mod-A, total-A to scoot to HOB, sit<>stand mod-A, requires multiple attempts. Requires mod-A to stand with FWW, R LE bernadette out and pt becomes light headed and nauseous. Returned supine, does not tolerate compression of BP cuff. Multiple attempts to reposition, however unable to get comfortable, reporting L LE numbness after stand, nursing notified.    PM: pt not feeling any better, continues to have low BPs and not feeling well, pain unmanaged. States she knows it won't go well and will almost pass out again. Requests for further PT tomorrow.  Barriers to return to prior living situation: Current pain, level of assist, mobility status, lives alone  Recommendations for discharge: TCU  Rationale for recommendations: Will need ongoing skilled PT intervention to improve independence and safety with functional mobility skills prior to returning home.       Entered by: Dank Torres 08/07/2019 12:19 PM

## 2019-08-07 NOTE — PLAN OF CARE
POD:1  from a REVISION laminectomy L 2 -Sacrum ; POSTERIOR spinal FUSION L2 -Sacrum . A&Ox4. CMS intact ex. RLE  numbness and tingling, present before surgery. Bowel sounds hypoactive, - flatus, tolerating ice chips and clear liquids. VSS ex. Soft BP on 1 L NC. Dressing marked drainage. RAJI to bulb suction. Johnson patent, AM nurse aware of urine output and will address with MD.  Has not gotten up since surgery. Repositioned/ weight shifted provided ax2.  C/o incisional pain, decreased with  Oxycodone, dilaudid and ice.  PIV infusion. Continue to monitor

## 2019-08-07 NOTE — PROGRESS NOTES
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 transferring;ambulation;dressing;bathing   Prior Functional Level Comment IND with ADs at baseline, activity limited by back pain   General Information   Onset of Illness/Injury or Date of Surgery - Date 08/06/19   Referring Physician Dr. Doan   Patient/Family Goals Statement TCU Friday   Pertinent History of Current Problem (include personal factors and/or comorbidities that impact the POC) POD1 Revision decompression of bilateral L2-S1 and posterior spinal fusion L2-S1    Precautions/Limitations fall precautions;spinal precautions   Cognitive Status Examination   Orientation orientation to person, place and time   Level of Consciousness alert   Follows Commands and Answers Questions 100% of the time   Pain Assessment   Patient Currently in Pain   (8/10 at best)   Integumentary/Edema   Integumentary/Edema Comments Dressing CDI   Posture    Posture Forward head position   Range of Motion (ROM)   ROM Comment Decreased B LE due to increasing back pain   Strength   Strength Comments B LE >3/5 functionally   Bed Mobility   Bed Mobility Comments Mod-A supine>sit   Transfer Skills   Transfer Comments Mod-A sit<>stand   Gait   Gait Comments Unable   Balance   Balance Comments Requires UE support for sitting balance   Sensory  "Examination   Sensory Perception Comments R LE numbness L2-S1   Modality Interventions   Planned Modality Interventions Cryotherapy   General Therapy Interventions   Planned Therapy Interventions progressive activity/exercise;home program guidelines;risk factor education;transfer training;strengthening;gait training;neuromuscular re-education;bed mobility training   Clinical Impression   Criteria for Skilled Therapeutic Intervention yes, treatment indicated   PT Diagnosis Impaired functional mobility   Influenced by the following impairments Pain, fusion, LE numbness   Functional limitations due to impairments Bed mobility, transfers, gait   Clinical Presentation Evolving/Changing   Clinical Presentation Rationale Changing sensation presentation   Clinical Decision Making (Complexity) Low complexity   Therapy Frequency 2x/day   Predicted Duration of Therapy Intervention (days/wks) 3 days   Anticipated Discharge Disposition Transitional Care Facility   Risk & Benefits of therapy have been explained Yes   Patient, Family & other staff in agreement with plan of care Yes   [facility] \"6 Clicks\"   2016, Trustees of [facility], under license to [employer].  All rights reserved.   6 Clicks Short Forms Basic Mobility Inpatient Short Form   [facility]  \"6 Clicks\" V.2 Basic Mobility Inpatient Short Form   1. Turning from your back to your side while in a flat bed without using bedrails? 2 - A Lot   2. Moving from lying on your back to sitting on the side of a flat bed without using bedrails? 2 - A Lot   3. Moving to and from a bed to a chair (including a wheelchair)? 2 - A Lot   4. Standing up from a chair using your arms (e.g., wheelchair, or bedside chair)? 2 - A Lot   5. To walk in hospital room? 2 - A Lot   6. Climbing 3-5 steps with a railing? 1 - Total   Basic Mobility Raw Score (Score out of 24.Lower scores equate to lower levels of function) 11   Total Evaluation Time   Total " Evaluation Time (Minutes) 10

## 2019-08-08 ENCOUNTER — APPOINTMENT (OUTPATIENT)
Dept: PHYSICAL THERAPY | Facility: CLINIC | Age: 69
DRG: 457 | End: 2019-08-08
Attending: ORTHOPAEDIC SURGERY
Payer: COMMERCIAL

## 2019-08-08 ENCOUNTER — APPOINTMENT (OUTPATIENT)
Dept: OCCUPATIONAL THERAPY | Facility: CLINIC | Age: 69
DRG: 457 | End: 2019-08-08
Attending: ORTHOPAEDIC SURGERY
Payer: COMMERCIAL

## 2019-08-08 LAB
ANION GAP SERPL CALCULATED.3IONS-SCNC: 4 MMOL/L (ref 3–14)
BUN SERPL-MCNC: 11 MG/DL (ref 7–30)
CA-I BLD-MCNC: 4.3 MG/DL (ref 4.4–5.2)
CALCIUM SERPL-MCNC: 7.7 MG/DL (ref 8.5–10.1)
CHLORIDE SERPL-SCNC: 111 MMOL/L (ref 94–109)
CO2 SERPL-SCNC: 25 MMOL/L (ref 20–32)
CREAT SERPL-MCNC: 0.93 MG/DL (ref 0.52–1.04)
GFR SERPL CREATININE-BSD FRML MDRD: 63 ML/MIN/{1.73_M2}
GLUCOSE BLDC GLUCOMTR-MCNC: 88 MG/DL (ref 70–99)
GLUCOSE SERPL-MCNC: 95 MG/DL (ref 70–99)
HGB BLD-MCNC: 9.8 G/DL (ref 11.7–15.7)
MAGNESIUM SERPL-MCNC: 2.2 MG/DL (ref 1.6–2.3)
POTASSIUM SERPL-SCNC: 4.1 MMOL/L (ref 3.4–5.3)
SODIUM SERPL-SCNC: 140 MMOL/L (ref 133–144)

## 2019-08-08 PROCEDURE — 12000000 ZZH R&B MED SURG/OB

## 2019-08-08 PROCEDURE — 97530 THERAPEUTIC ACTIVITIES: CPT | Mod: GP

## 2019-08-08 PROCEDURE — 36415 COLL VENOUS BLD VENIPUNCTURE: CPT | Performed by: ORTHOPAEDIC SURGERY

## 2019-08-08 PROCEDURE — 85018 HEMOGLOBIN: CPT | Performed by: ORTHOPAEDIC SURGERY

## 2019-08-08 PROCEDURE — 25000128 H RX IP 250 OP 636: Performed by: PHYSICIAN ASSISTANT

## 2019-08-08 PROCEDURE — 25000128 H RX IP 250 OP 636

## 2019-08-08 PROCEDURE — 25000132 ZZH RX MED GY IP 250 OP 250 PS 637: Performed by: ORTHOPAEDIC SURGERY

## 2019-08-08 PROCEDURE — 80048 BASIC METABOLIC PNL TOTAL CA: CPT | Performed by: ORTHOPAEDIC SURGERY

## 2019-08-08 PROCEDURE — 82330 ASSAY OF CALCIUM: CPT | Performed by: PHYSICIAN ASSISTANT

## 2019-08-08 PROCEDURE — 00000146 ZZHCL STATISTIC GLUCOSE BY METER IP

## 2019-08-08 PROCEDURE — 97165 OT EVAL LOW COMPLEX 30 MIN: CPT | Mod: GO

## 2019-08-08 PROCEDURE — 25000132 ZZH RX MED GY IP 250 OP 250 PS 637: Performed by: PHYSICIAN ASSISTANT

## 2019-08-08 PROCEDURE — 83735 ASSAY OF MAGNESIUM: CPT | Performed by: ORTHOPAEDIC SURGERY

## 2019-08-08 PROCEDURE — 25000131 ZZH RX MED GY IP 250 OP 636 PS 637: Performed by: ORTHOPAEDIC SURGERY

## 2019-08-08 PROCEDURE — 99231 SBSQ HOSP IP/OBS SF/LOW 25: CPT | Performed by: PHYSICIAN ASSISTANT

## 2019-08-08 PROCEDURE — 97530 THERAPEUTIC ACTIVITIES: CPT | Mod: GO

## 2019-08-08 PROCEDURE — 25800030 ZZH RX IP 258 OP 636: Performed by: PHYSICIAN ASSISTANT

## 2019-08-08 PROCEDURE — 25000128 H RX IP 250 OP 636: Performed by: ORTHOPAEDIC SURGERY

## 2019-08-08 RX ORDER — HYDROMORPHONE HYDROCHLORIDE 2 MG/1
2 TABLET ORAL
Status: DISCONTINUED | OUTPATIENT
Start: 2019-08-08 | End: 2019-08-09

## 2019-08-08 RX ORDER — HYDROMORPHONE HYDROCHLORIDE 1 MG/ML
0.2 INJECTION, SOLUTION INTRAMUSCULAR; INTRAVENOUS; SUBCUTANEOUS EVERY 6 HOURS PRN
Status: DISCONTINUED | OUTPATIENT
Start: 2019-08-08 | End: 2019-08-08

## 2019-08-08 RX ADMIN — ONDANSETRON 4 MG: 4 TABLET, ORALLY DISINTEGRATING ORAL at 15:18

## 2019-08-08 RX ADMIN — HYALURONIDASE (HUMAN RECOMBINANT) 150 UNITS: 150 INJECTION, SOLUTION SUBCUTANEOUS at 15:40

## 2019-08-08 RX ADMIN — SENNOSIDES AND DOCUSATE SODIUM 2 TABLET: 8.6; 5 TABLET ORAL at 10:01

## 2019-08-08 RX ADMIN — BUPROPION HYDROCHLORIDE 150 MG: 150 TABLET, FILM COATED, EXTENDED RELEASE ORAL at 10:01

## 2019-08-08 RX ADMIN — CHOLECALCIFEROL CAP 125 MCG (5000 UNIT) 10000 UNITS: 125 CAP at 10:02

## 2019-08-08 RX ADMIN — HYDROMORPHONE HYDROCHLORIDE 4 MG: 2 TABLET ORAL at 05:44

## 2019-08-08 RX ADMIN — HYDROMORPHONE HYDROCHLORIDE 0.2 MG: 1 INJECTION, SOLUTION INTRAMUSCULAR; INTRAVENOUS; SUBCUTANEOUS at 01:19

## 2019-08-08 RX ADMIN — HYDROMORPHONE HYDROCHLORIDE 2 MG: 2 TABLET ORAL at 22:25

## 2019-08-08 RX ADMIN — FLUOXETINE 60 MG: 20 CAPSULE ORAL at 10:02

## 2019-08-08 RX ADMIN — ACETAMINOPHEN 975 MG: 325 TABLET, FILM COATED ORAL at 22:07

## 2019-08-08 RX ADMIN — HYDROMORPHONE HYDROCHLORIDE 0.2 MG: 1 INJECTION, SOLUTION INTRAMUSCULAR; INTRAVENOUS; SUBCUTANEOUS at 03:42

## 2019-08-08 RX ADMIN — CALCIUM GLUCONATE 1 G: 98 INJECTION, SOLUTION INTRAVENOUS at 13:14

## 2019-08-08 RX ADMIN — ACETAMINOPHEN 975 MG: 325 TABLET, FILM COATED ORAL at 05:44

## 2019-08-08 RX ADMIN — HYDROMORPHONE HYDROCHLORIDE 4 MG: 2 TABLET ORAL at 10:15

## 2019-08-08 RX ADMIN — HYDROMORPHONE HYDROCHLORIDE 2 MG: 2 TABLET ORAL at 19:29

## 2019-08-08 RX ADMIN — MAGNESIUM HYDROXIDE 30 ML: 400 SUSPENSION ORAL at 22:07

## 2019-08-08 RX ADMIN — SENNOSIDES AND DOCUSATE SODIUM 2 TABLET: 8.6; 5 TABLET ORAL at 22:07

## 2019-08-08 RX ADMIN — HYDROMORPHONE HYDROCHLORIDE 2 MG: 2 TABLET ORAL at 16:25

## 2019-08-08 RX ADMIN — ACETAMINOPHEN 975 MG: 325 TABLET, FILM COATED ORAL at 14:18

## 2019-08-08 ASSESSMENT — ACTIVITIES OF DAILY LIVING (ADL)
ADLS_ACUITY_SCORE: 14
ADLS_ACUITY_SCORE: 14
ADLS_ACUITY_SCORE: 16
PREVIOUS_RESPONSIBILITIES: MEAL PREP;HOUSEKEEPING;LAUNDRY;SHOPPING;MEDICATION MANAGEMENT;FINANCES;DRIVING
ADLS_ACUITY_SCORE: 16
ADLS_ACUITY_SCORE: 14
ADLS_ACUITY_SCORE: 15

## 2019-08-08 NOTE — PLAN OF CARE
Discharge Planner OT   Patient plan for discharge: TCU  Current status: OT orders received, initial eval complete and treatment initiated. Pt admitted for L1-S2 spinal decompression/fusion. Pt lives alone in a multi-level townhouse. Pt previously IND in all I/ADL's.     Pt completed log roll w/vc's and min A for bringing body to side, able to bring body upright w/SBA. Extra time req'd to scoot hips to EOB. STS w/FWW and CGAx2.w/ nausea and dizziness. Returned to sitting, difficulty maintaining balance at EOB, sliding off EOB, requiring A to maintain sitting. Max Ax2 for sit>sup and for scooting up in bed. Pt O2 sats at 92% post activity w/2L O2 via nc.   Barriers to return to prior living situation: current level of assist, imp functional transfers and I/ADL's, multi-level home  Recommendations for discharge: TCU  Rationale for recommendations: Pt below PLOF w/transfers and I/ADL's. Pt will benefit from continued rehab to return to PLOF before returning home.        Entered by: Nancy Duran 08/08/2019 12:31 PM

## 2019-08-08 NOTE — CONSULTS
Care Transition Initial Assessment - SW     Met with: PATIENT    Active Problems:    Degenerative scoliosis in adult patient       DATA  Lives With: alone   Living Arrangements: house     Description of Support System: Involved  Who is your support system?: Sibling(s)  Support Assessment: Adequate social supports.  Identified issues/concerns regarding health management: Need for increased supports at time of discharge.    ASSESSMENT  Cognitive Status:  Awake, alert, oriented  Concerns to be addressed: Discharge planning    SW reviewed chart and met with patient to discuss discharge planning.  Pt was admitted 8/6/19 with Degenerative scoliosis. Anticipated discharge date: 8/9-8/10. SW introduced self and role. Pt stated she resides alone in her [facility] receiving no outside services. Pt reports being totally independent up to this point. We reviewed therapy recommendations for: TCU. Pt asks SW to send referral to [facility], stating she has already called and spoken with Jud in admissions. Pt stated she is confident [facility] will take her, thus would not provide any other choices. SW sent referral thru DOD. We briefly discussed transportation. Pt may require HE w/c and would accept costs.      PLAN  Financial costs for the patient includes: Possible transportation costs .  Patient given options and choices for discharge Yes .  Patient/family is agreeable to the plan?  Yes  Transportation/person available to transport on day of discharge  is TBD  Patient Goals and Preferences: Discharge to TCU .  Patient anticipates discharging to:  TCU .    Continue to assist as needed to ensure a safe discharge.    JULIO C Perkins    UPDATE@[id number]: Per [facility] admissions, they can accept patient Saturday into a private room in their TCU. Pt may need to be an assist of 1 prior to discharge. [phone]. BENITA completed an on front of patient's chart. PAS #[id number]

## 2019-08-08 NOTE — PROGRESS NOTES
[facility]    Medicine Progress Note - Hospitalist Service       Date of Admission:  8/6/2019  Assessment & Plan   Leonor Bernstein is a pleasant 69-year-old female with a past medical history of hypertension, GERD, depressive disorder, anemia, osteoporosis and sleep apnea who underwent a planned revision L2-S1 decompression and fusion on 08/06/2019.  The Hospitalist Service was consulted for routine postoperative medical co-management.      Revision decompression of bilateral L2-S1 and posterior spinal fusion L2-S1 - 8/6/19   The patient is doing well.  Denies any pain currently.  She is reporting increased numbness in the right lower extremity from the knee down to the foot.  Her numbness was present prior to surgery but is now more pronounced.    - monitor numbness  - Defer routine postop cares to the Orthopedic Spine Surgery team   - PT/OT consulted per primary service  - Plan for TCU Friday 8/9/19  - suggest Dilaudid PO over oxycodone due to renal dysfunction  - continue APAP 975mg TID scheduled  - stop IV pain meds as able and decrease her dosing of PO Dilaudid to prevent over sedation and hypoxia in setting of sleep apnea (ok to use O2 at night as she declines CPAP)  - High doses of narcotics in this patient will lead to lethargy, hypoxia, and hypotension as well as lightheadedness and dizziness. (Has already had total of 8mg oral Dilaudiid and 0.4mg IV Dilaudid today prior to 11 AM in opiate naive patient)  - avoid benzos and other sedatives as able     Acute blood loss anemia, worsening    Preop Hgb 12.9 and postop 11.4 -->10.1.  mL  - RAJI output decreasing, Hgb stable     Recent Labs   Lab 08/08/19  0746 08/07/19  1359 08/07/19  0623 08/06/19  1628   HGB 9.8* 10.0* 10.1* 11.4*     Hypocalcemia: repleting PRN    Recent acute kidney injury     Seen in Care Everywhere.  Per pt, it was attributed to increased naproxen use which she stopped using.   Cr Trend: 6/3 1.28 --> 7/1 1.44--> 7/29  1.65--> 8/5 1.34--> 0.98-->1.15-->0.93  Previous baseline up until 4/2019 appears to be 0.9-1    - Would avoid NSAIDs going forward  - avoid nephrotoxins  - suggest Dilaudid PO over oxycodone due to renal dysfunction     Mild leukocytosis, resolved  WBC is mildly elevated at 13.3.  The patient is afebrile.  No infectious symptoms.  This is likely stress response from Surgery and resolved POD #1 to 10.5.     Hypertension.    The patient was recently taken off losartan placed on amlodipine 5 mg daily. Normotensive.   - Continue PTA amlodipine with hold parameters      Obstructive sleep apnea, not on CPAP    The patient does not tolerate CPAP.    - Recommend continuous pulse oximetry, capnography during this hospitalization.   - Weaned off O2     Depressive disorder.    - Resume PTA Wellbutrin and fluoxetine.    - Ativan available p.r.n.      Osteoporosis and vitamin D deficiency.    - Resume prior to admission vitamin D supplement, ortho increasing     Gastroesophageal reflux disease.  The patient is not on a PPI.     Diet: Advance Diet as Tolerated: Regular Diet Adult    DVT Prophylaxis: Defer to primary service  Johnson Catheter: in place, indication: Anesthesia  Code Status: Full Code      Disposition Plan   Expected discharge: Tomorrow, recommended to transitional care unit once adequate pain management/ tolerating PO medications, hemoglobin stable, safe disposition plan/ TCU bed available and cleared by primary service.  Entered: Betina Stover PA-C 08/08/2019, 12:44 PM       The patient's care was discussed with the Bedside Nurse and Patient.    Betina Stover PA-C  Hospitalist Service  [facility]    ______________________________________________________________________    Interval History   C/o but sleepy on exam related to her oral Dilauidid. Paresthesias improving. No N/V. Tolerating small amount of food. IVF off. Replete Calcium. C/o pain but then falling asleep during interview -    Has already had total of 8mg oral Dilaudiid and 0.4mg IV Dilaudid today prior to 11 AM.    Data reviewed today: I reviewed all medications, new labs and imaging results over the last 24 hours. I personally reviewed no images or EKG's today.    Physical Exam   Vital Signs: Temp: 97.8  F (36.6  C) Temp src: Axillary BP: 115/55(small cuff)   Heart Rate: 76 Resp: 16 SpO2: 93 % O2 Device: Nasal cannula Oxygen Delivery: 2 LPM  Weight: 223 lbs 0 oz    General: Awake but very sleepy, falling asleep during interview, looks comfortable laying in bed. No acute distress.  HEENT: Normocephalic, atraumatic. Extraocular movements intact.    Respiratory: Clear to auscultation bilaterally, no rales, wheezing, or rhonchi to anterolateral fields.  Cardiovascular: Regular rate and rhythm, +S1 and S2, no murmur auscultated. No peripheral edema.   Gastrointestinal: Soft, non-tender, non-distended. Bowel sounds present. Rt sided RAJI drain noted.  Skin: Warm, dry. No obvious rashes or lesions on exposed skin.   Musculoskeletal: No joint swelling, erythema or tenderness. 5/5 strength to BLE but mildly weaker on RLE vs LLE.  Neurologic: AAO x3. Cranial nerves 2-12 grossly intact, normal strength and sensation.    Data   Recent Labs   Lab 08/08/19  0746 08/07/19  1359 08/07/19  0623 08/06/19  1628   WBC  --   --  10.5 13.3*   HGB 9.8* 10.0* 10.1* 11.4*   MCV  --   --   --  95   PLT  --   --   --  236     --  140 142   POTASSIUM 4.1  --  4.5 4.0   CHLORIDE 111*  --  111* 112*   CO2 25  --  22 25   BUN 11  --  16 18   CR 0.93  --  1.15* 0.98   ANIONGAP 4  --  7 5   VICENTE 7.7*  --  7.4* 7.6*   GLC 95  --  121* 116*     No results found for this or any previous visit (from the past 24 hour(s)).  Medications     No Medication Sleep Aids for this Patient       sodium chloride 75 mL/hr at 08/08/19 1244       acetaminophen  975 mg Oral Q8H     amLODIPine  5 mg Oral Daily     buPROPion  150 mg Oral QAM     calcium gluconate  1 g Intravenous Once      cholecalciferol  10,000 Units Oral Daily     FLUoxetine  60 mg Oral Daily     senna-docusate  1 tablet Oral BID    Or     senna-docusate  2 tablet Oral BID     sodium chloride (PF)  3 mL Intravenous Q8H     sodium chloride (PF)  3 mL Intracatheter Q8H     vitamin D2  50,000 Units Oral Q Mon Wed Fri AM

## 2019-08-08 NOTE — PROVIDER NOTIFICATION
Text page to Dr. Doan,    LILIA pt's RAJI put out 40cc, do you still want this d/c'd? Also martinez is still in, will be OOB with PT for first time this afternoon, would you like us to remove this? Thanks!    Addendum: call back, ok to remove drain as the drainage is thin and serous, martinez kept in per hospitalist request, so will clarify with them if it can be removed, no objection from Dr. Doan regarding its removal.     Addendum: call back from DULCE Moffett with hospitalist service. Okay for martinez to be removed if output is adequate (only 250cc today - will keep in until tomorrow morning). Also recommended to pre treat with zofran and encourage lots of fluids before pt gets up as she has a hx of lightheaded and nausea post surgery when OOB.

## 2019-08-08 NOTE — PROGRESS NOTES
08/08/19 1130   Quick Adds   Type of Visit Initial Occupational Therapy Evaluation   Living Environment   Lives With alone   Living Arrangements house   Home Accessibility stairs to enter home;stairs within home   Number of Stairs, Main Entrance other (see comments)  (12 up/down)   Stair Railings, Main Entrance railings on both sides of stairs   Living Environment Comment Pt lives alone, will have some assist of friends for I/ADL's as needed.   Functional Level   Ambulation 0-->independent   Transferring 0-->independent   Toileting 1-->assistive equipment   Bathing 1-->assistive equipment   Dressing 0-->independent   Eating 0-->independent   Communication 0-->understands/communicates without difficulty   Cognition 0 - no cognition issues reported   Fall history within last six months yes   Number of times patient has fallen within last six months 1   Which of the above functional risks had a recent onset or change? transferring;ambulation;bathing;toileting;dressing   Prior Functional Level Comment Pt was IND in all I/ADL's   General Information   Onset of Illness/Injury or Date of Surgery - Date 08/06/19   Referring Physician Raj Doan MD   Patient/Family Goals Statement Rehab   Additional Occupational Profile Info/Pertinent History of Current Problem L1-S2 decompressoin/fusion   Precautions/Limitations spinal precautions;fall precautions   Cognitive Status Examination   Orientation orientation to person, place and time   Level of Consciousness alert   Follows Commands (Cognition) WFL   Visual Perception   Visual Perception Comments wears glasses for reading   Sensory Examination   Sensory Comments reports numbness in LLE since before surguery   Pain Assessment   Patient Currently in Pain Yes, see Vital Sign flowsheet   Range of Motion (ROM)   ROM Quick Adds No deficits were identified   Strength   Strength Comments BUE strength not formally tested d/t spinal precautions   Hand Strength   Hand  "Strength Comments WFL   Coordination   Upper Extremity Coordination No deficits were identified   Mobility   Bed Mobility Comments Min A sup>sit, max x 2 sit>sup   Transfer Skill: Sit to Stand   Level of [place]: Sit/Stand moderate assist (50% patients effort)   Physical Assist/Nonphysical Assist: Sit/Stand 2 persons;supervision   Assistive Device for Transfer: Sit/Stand rolling walker   Lower Body Dressing   Level of [place]: Dress Lower Body maximum assist (25% patients effort)   Instrumental Activities of Daily Living (IADL)   Previous Responsibilities meal prep;housekeeping;laundry;shopping;medication management;finances;driving   Activities of Daily Living Analysis   Impairments Contributing to Impaired Activities of Daily Living balance impaired;pain;post surgical precautions;sensation decreased   General Therapy Interventions   Planned Therapy Interventions ADL retraining   Clinical Impression   Criteria for Skilled Therapeutic Interventions Met yes, treatment indicated   OT Diagnosis Imp. I/ADL, Imp functional transfers, imp mobility   Influenced by the following impairments Pain, numbness in LLE, spinal precauations, impaired functional act tolerance and balance   Assessment of Occupational Performance 1-3 Performance Deficits   Identified Performance Deficits I/ADL, functional transfers and mobility   Clinical Decision Making (Complexity) Low complexity   Therapy Frequency Daily   Predicted Duration of Therapy Intervention (days/wks) 7   Anticipated Equipment Needs at Discharge sock aide;reacher;bath sponge;shower chair;raised toilet seat   Anticipated Discharge Disposition Transitional Care Facility   Risks and Benefits of Treatment have been explained. Yes   Patient, Family & other staff in agreement with plan of care Yes   [facility] AM-PAC TM \"6 Clicks\"   2016, Trustees of [facility], under license to [employer].  All rights reserved.   6 Clicks Short Forms Daily Activity " "Inpatient Short Form   [facility] AM-PAC  \"6 Clicks\" Daily Activity Inpatient Short Form   1. Putting on and taking off regular lower body clothing? 1 - Total   2. Bathing (including washing, rinsing, drying)? 1 - Total   3. Toileting, which includes using toilet, bedpan or urinal? 1 - Total   4. Putting on and taking off regular upper body clothing? 2 - A Lot   5. Taking care of personal grooming such as brushing teeth? 3 - A Little   6. Eating meals? 4 - None   Daily Activity Raw Score (Score out of 24.Lower scores equate to lower levels of function) 12   Total Evaluation Time   Total Evaluation Time (Minutes) 10     "

## 2019-08-08 NOTE — PLAN OF CARE
POD 2 from an L2-S1 decompression/fusion. A&Ox4. CMS: numbness in R foot up to knee, tingling in L foot, MD aware. Bowel sounds active, passing flatus. Regular  diet but only has tolerated ice and water overnight. VSS on 1.5L NC. Dressing marked with dried drainage. RAJI patent with 70 mL bloody/bright red and serosanguineous drainage. Not up this shift, assist x2 GBW. C/o 8/10 incisional pain, decreased with IV dilaudid x2, PO dil x1, and sched Tyl, repositioning, ice, and abdominal binder. Johnson patent with 475 mL overnight, plan to remove today. Discharge 8/9 to TCU pending clinical improvement.

## 2019-08-08 NOTE — PROVIDER NOTIFICATION
Paged Dr. Muse re: iv infiltration right hand.  Hand swollen/tenderness/burning noted, recent infusion of calcium gluconate.  Will discontinue the iv port and monitor site for inflammation and elevate on pillows.  MD phoned back and aware, continue to monitor and he will contact pharmacy for any other risks.

## 2019-08-08 NOTE — PROGRESS NOTES
History:   Feels much better today.  States she was very thirsty yesterday, did not eat much but drank a lot.  Preop right leg numbness is still worse developed some paresthesias in her plantar foot on the left yesterday.  Was unable to do much in PT due to lightheadedness.  Still on liquid diet, positive flatus denies abdominal discomfort.   Vitals:   B/P: 116/66, T: 98.8, P: 68, R: 18       Lab Results   Component Value Date     08/07/2019     08/06/2019     06/03/2019    Lab Results   Component Value Date    CHLORIDE 111 08/07/2019    CHLORIDE 112 08/06/2019    CHLORIDE 106 06/03/2019    Lab Results   Component Value Date    BUN 16 08/07/2019    BUN 18 08/06/2019    BUN 20 06/03/2019      Lab Results   Component Value Date    POTASSIUM 4.5 08/07/2019    POTASSIUM 4.0 08/06/2019    POTASSIUM 3.4 06/03/2019    Lab Results   Component Value Date    CO2 22 08/07/2019    CO2 25 08/06/2019    CO2 28 06/03/2019    Lab Results   Component Value Date    CR 1.15 08/07/2019    CR 0.98 08/06/2019    CR 1.28 06/03/2019        Lab Results   Component Value Date    WBC 10.5 08/07/2019    WBC 13.3 (H) 08/06/2019    WBC 7.8 06/03/2019    HGB 10.0 (L) 08/07/2019    HGB 10.1 (L) 08/07/2019    HGB 11.4 (L) 08/06/2019    HCT 35.9 08/06/2019    HCT 39.8 06/03/2019    MCV 95 08/06/2019    MCV 91 06/03/2019     08/06/2019     06/03/2019         Intake/Output Summary (Last 24 hours) at 8/8/2019 0731  Last data filed at 8/8/2019 0600  Gross per 24 hour   Intake 1943 ml   Output 1125 ml   Net 818 ml      Results for orders placed or performed during the hospital encounter of 08/06/19 (from the past 24 hour(s))   Calcium ionized   Result Value Ref Range    Calcium Ionized 4.1 (L) 4.4 - 5.2 mg/dL   Hemoglobin   Result Value Ref Range    Hemoglobin 10.0 (L) 11.7 - 15.7 g/dL   Glucose by meter   Result Value Ref Range    Glucose 88 70 - 99 mg/dL     Hemovac output: 70 cc emptied 90 minutes ago scant in the bulb  now and thin appearing  Dressing:   Has some dried bloody drainage  Neuro:   Motor: 4+/5 in the right TA and EHL.  5/5   Sensation: Decreased to light touch in L5 distribution on the right and the plantar foot on the left  Abdomen:  Soft and nontender bowel sounds active by report  Extremities:   No swelling or calf tenderness  A/P:   Stable POD # 2   Will remove RAJI and change dressing.  Seems like she will be more able to participate in PT today.  Possible transfer to rehab tomorrow depending on her progress today.  BM today.

## 2019-08-08 NOTE — PLAN OF CARE
PT: Schedule tx attempted this AM, pt with uncontrolled pain and waiting pain meds, nursing lifting to get pt to appropriate elevation to take meds as pt unable to move in bed. Plan to return at 3pm today per pt and nursing request to coordinate with schedule.

## 2019-08-08 NOTE — PLAN OF CARE
REVISION L2-S1/PSF POD2.  A&O. CMS - numbness right lower extremity (knee to toes), tingling left foot, right leg tenderness/weakness noted.   Bowel sounds hypoactive/flatus positive/declining suppository this afternoon/senna dose increased this am/no bm since surgery. Tolerating clear liquid diet/abdomen soft/nontender/no nausea noted. VSS. Oxygen titrated to RA/patient stated has sleep apnea and refuses to wear/desats on RA when sleeping/ prn oxygen used when asleep per medical. Posterior dressing marked/RAJI patent/output 40 ml this shift - verified discontinue pull/ plan to remove this evening. Bedrest with 2 assist/ceiling lift to boost/reposition/dangled with OT only.  Right leg pain decreased with rest/dilaudid/tylenol extra strength.  Johnson patent/250 urine output.

## 2019-08-08 NOTE — PLAN OF CARE
Discharge Planner PT       Patient plan for discharge: TCU on Friday  Current status: R LE numbness not improved, increased weakness noted today, unable to move leg in bed without assist. /70 supine. Mod-A supine>sit. Sits EOB x 6 min without dizziness, just increased pain. Min-A sit<>stand, stands x 5 min with FWW CGA, unable to march and move R LE. Sudden sever dizziness, returned supine with max-A of 2. /81 after supine ~ 4 min. Encouraged increased fluids, AP and LE movement in bed as tolerated.  Barriers to return to prior living situation: Current pain, level of assist, mobility status, lives alone  Recommendations for discharge: TCU  Rationale for recommendations: Will need ongoing skilled PT intervention to improve independence and safety with functional mobility skills prior to returning home.       Entered by: Dank Torres 08/08/2019 5:56 PM

## 2019-08-09 ENCOUNTER — APPOINTMENT (OUTPATIENT)
Dept: PHYSICAL THERAPY | Facility: CLINIC | Age: 69
DRG: 457 | End: 2019-08-09
Attending: ORTHOPAEDIC SURGERY
Payer: COMMERCIAL

## 2019-08-09 ENCOUNTER — APPOINTMENT (OUTPATIENT)
Dept: OCCUPATIONAL THERAPY | Facility: CLINIC | Age: 69
DRG: 457 | End: 2019-08-09
Attending: ORTHOPAEDIC SURGERY
Payer: COMMERCIAL

## 2019-08-09 LAB
ANION GAP SERPL CALCULATED.3IONS-SCNC: 8 MMOL/L (ref 3–14)
BUN SERPL-MCNC: 12 MG/DL (ref 7–30)
CALCIUM SERPL-MCNC: 8.2 MG/DL (ref 8.5–10.1)
CHLORIDE SERPL-SCNC: 104 MMOL/L (ref 94–109)
CO2 SERPL-SCNC: 26 MMOL/L (ref 20–32)
CREAT SERPL-MCNC: 0.91 MG/DL (ref 0.52–1.04)
GFR SERPL CREATININE-BSD FRML MDRD: 65 ML/MIN/{1.73_M2}
GLUCOSE SERPL-MCNC: 100 MG/DL (ref 70–99)
POTASSIUM SERPL-SCNC: 3.8 MMOL/L (ref 3.4–5.3)
SODIUM SERPL-SCNC: 138 MMOL/L (ref 133–144)

## 2019-08-09 PROCEDURE — 97116 GAIT TRAINING THERAPY: CPT | Mod: GP

## 2019-08-09 PROCEDURE — 97530 THERAPEUTIC ACTIVITIES: CPT | Mod: GP

## 2019-08-09 PROCEDURE — 25000132 ZZH RX MED GY IP 250 OP 250 PS 637: Performed by: PHYSICIAN ASSISTANT

## 2019-08-09 PROCEDURE — 36415 COLL VENOUS BLD VENIPUNCTURE: CPT | Performed by: PHYSICIAN ASSISTANT

## 2019-08-09 PROCEDURE — 97530 THERAPEUTIC ACTIVITIES: CPT | Mod: GO | Performed by: OCCUPATIONAL THERAPIST

## 2019-08-09 PROCEDURE — 97535 SELF CARE MNGMENT TRAINING: CPT | Mod: GO | Performed by: OCCUPATIONAL THERAPIST

## 2019-08-09 PROCEDURE — 80048 BASIC METABOLIC PNL TOTAL CA: CPT | Performed by: PHYSICIAN ASSISTANT

## 2019-08-09 PROCEDURE — 12000000 ZZH R&B MED SURG/OB

## 2019-08-09 PROCEDURE — 25000132 ZZH RX MED GY IP 250 OP 250 PS 637: Performed by: ORTHOPAEDIC SURGERY

## 2019-08-09 PROCEDURE — 99231 SBSQ HOSP IP/OBS SF/LOW 25: CPT | Performed by: PHYSICIAN ASSISTANT

## 2019-08-09 RX ORDER — BENZONATATE 100 MG/1
100 CAPSULE ORAL 3 TIMES DAILY PRN
Status: DISCONTINUED | OUTPATIENT
Start: 2019-08-09 | End: 2019-08-10 | Stop reason: HOSPADM

## 2019-08-09 RX ORDER — HYDROMORPHONE HYDROCHLORIDE 2 MG/1
2-4 TABLET ORAL
Status: DISCONTINUED | OUTPATIENT
Start: 2019-08-09 | End: 2019-08-10 | Stop reason: HOSPADM

## 2019-08-09 RX ORDER — IPRATROPIUM BROMIDE AND ALBUTEROL SULFATE 2.5; .5 MG/3ML; MG/3ML
3 SOLUTION RESPIRATORY (INHALATION)
Status: DISCONTINUED | OUTPATIENT
Start: 2019-08-09 | End: 2019-08-10 | Stop reason: HOSPADM

## 2019-08-09 RX ORDER — IPRATROPIUM BROMIDE AND ALBUTEROL SULFATE 2.5; .5 MG/3ML; MG/3ML
3 SOLUTION RESPIRATORY (INHALATION)
Status: DISCONTINUED | OUTPATIENT
Start: 2019-08-09 | End: 2019-08-09

## 2019-08-09 RX ORDER — MAGNESIUM CARB/ALUMINUM HYDROX 105-160MG
148 TABLET,CHEWABLE ORAL DAILY PRN
Status: DISCONTINUED | OUTPATIENT
Start: 2019-08-09 | End: 2019-08-10 | Stop reason: HOSPADM

## 2019-08-09 RX ADMIN — HYDROMORPHONE HYDROCHLORIDE 4 MG: 2 TABLET ORAL at 20:33

## 2019-08-09 RX ADMIN — HYDROMORPHONE HYDROCHLORIDE 4 MG: 2 TABLET ORAL at 10:56

## 2019-08-09 RX ADMIN — CHOLECALCIFEROL CAP 125 MCG (5000 UNIT) 10000 UNITS: 125 CAP at 09:04

## 2019-08-09 RX ADMIN — FLUOXETINE 60 MG: 20 CAPSULE ORAL at 09:04

## 2019-08-09 RX ADMIN — ACETAMINOPHEN 650 MG: 325 TABLET, FILM COATED ORAL at 23:59

## 2019-08-09 RX ADMIN — ACETAMINOPHEN 975 MG: 325 TABLET, FILM COATED ORAL at 14:08

## 2019-08-09 RX ADMIN — BISACODYL 10 MG: 10 SUPPOSITORY RECTAL at 06:23

## 2019-08-09 RX ADMIN — ACETAMINOPHEN 975 MG: 325 TABLET, FILM COATED ORAL at 05:53

## 2019-08-09 RX ADMIN — ERGOCALCIFEROL 50000 UNITS: 1.25 CAPSULE, LIQUID FILLED ORAL at 09:48

## 2019-08-09 RX ADMIN — BUPROPION HYDROCHLORIDE 150 MG: 150 TABLET, FILM COATED, EXTENDED RELEASE ORAL at 09:04

## 2019-08-09 RX ADMIN — SENNOSIDES AND DOCUSATE SODIUM 2 TABLET: 8.6; 5 TABLET ORAL at 09:05

## 2019-08-09 RX ADMIN — HYDROMORPHONE HYDROCHLORIDE 4 MG: 2 TABLET ORAL at 14:08

## 2019-08-09 RX ADMIN — HYDROMORPHONE HYDROCHLORIDE 2 MG: 2 TABLET ORAL at 07:32

## 2019-08-09 RX ADMIN — ACETAMINOPHEN 650 MG: 325 TABLET, FILM COATED ORAL at 20:33

## 2019-08-09 RX ADMIN — HYDROMORPHONE HYDROCHLORIDE 2 MG: 2 TABLET ORAL at 01:36

## 2019-08-09 RX ADMIN — HYDROMORPHONE HYDROCHLORIDE 2 MG: 2 TABLET ORAL at 04:35

## 2019-08-09 RX ADMIN — AMLODIPINE BESYLATE 5 MG: 5 TABLET ORAL at 09:05

## 2019-08-09 ASSESSMENT — ACTIVITIES OF DAILY LIVING (ADL)
ADLS_ACUITY_SCORE: 15
ADLS_ACUITY_SCORE: 15
ADLS_ACUITY_SCORE: 16
ADLS_ACUITY_SCORE: 15
ADLS_ACUITY_SCORE: 16
ADLS_ACUITY_SCORE: 15

## 2019-08-09 NOTE — PLAN OF CARE
VSS. A/O. Up-1/SBA. Back incision dressing CDI. Denies pain. Neuros intact. Baseline LE Numbness. Fine crackles lower lungs, encouraged pul hygiene, IS 1000.  2L O2, Poor appetite. Voiding fine.

## 2019-08-09 NOTE — PROGRESS NOTES
History:   Feels pain is not adequately controlled by oral meds.  Is only getting 1 Dilaudid every 3 hours.  Has continued increased numbness in her right leg from preop.  Reports pain radiating into the mid posterior thigh on the left when standing. tolerating regular diet without difficulty.  No BM positive flatus.  Is not capable of much activity and PT   Vitals:   B/P: 131/62, T: 97.8, P: 68, R: 18       Lab Results   Component Value Date     08/09/2019     08/08/2019     08/07/2019    Lab Results   Component Value Date    CHLORIDE 104 08/09/2019    CHLORIDE 111 08/08/2019    CHLORIDE 111 08/07/2019    Lab Results   Component Value Date    BUN 12 08/09/2019    BUN 11 08/08/2019    BUN 16 08/07/2019      Lab Results   Component Value Date    POTASSIUM 3.8 08/09/2019    POTASSIUM 4.1 08/08/2019    POTASSIUM 4.5 08/07/2019    Lab Results   Component Value Date    CO2 26 08/09/2019    CO2 25 08/08/2019    CO2 22 08/07/2019    Lab Results   Component Value Date    CR 0.91 08/09/2019    CR 0.93 08/08/2019    CR 1.15 08/07/2019        Lab Results   Component Value Date    WBC 10.5 08/07/2019    WBC 13.3 (H) 08/06/2019    WBC 7.8 06/03/2019    HGB 9.8 (L) 08/08/2019    HGB 10.0 (L) 08/07/2019    HGB 10.1 (L) 08/07/2019    HCT 35.9 08/06/2019    HCT 39.8 06/03/2019    MCV 95 08/06/2019    MCV 91 06/03/2019     08/06/2019     06/03/2019         Intake/Output Summary (Last 24 hours) at 8/9/2019 0905  Last data filed at 8/9/2019 0632  Gross per 24 hour   Intake 780 ml   Output 1195 ml   Net -415 ml      Results for orders placed or performed during the hospital encounter of 08/06/19 (from the past 24 hour(s))   Basic metabolic panel   Result Value Ref Range    Sodium 138 133 - 144 mmol/L    Potassium 3.8 3.4 - 5.3 mmol/L    Chloride 104 94 - 109 mmol/L    Carbon Dioxide 26 20 - 32 mmol/L    Anion Gap 8 3 - 14 mmol/L    Glucose 100 (H) 70 - 99 mg/dL    Urea Nitrogen 12 7 - 30 mg/dL     Creatinine 0.91 0.52 - 1.04 mg/dL    GFR Estimate 65 >60 mL/min/[1.73_m2]    GFR Estimate If Black 75 >60 mL/min/[1.73_m2]    Calcium 8.2 (L) 8.5 - 10.1 mg/dL     Hemovac  removed last evening  Dressing:   Dry and intact.   Neuro:   Motor: 4+/5 in right tibias anterior, improved from yesterday.  2-3/5 in the right hip flexor may be secondary to pain.  Does better with encouragement.  Sensation: Decreased to light touch in the right leg same location as preop but increased.  Abdomen:  Soft and nontender  Extremities:   No swelling or calf tenderness.  Homans negative  A/P:   Stable POD # 3   Does not appear ready for transfer to rehab today.  Will hold transfer to rehab until tomorrow.  Neuro exam is stable and somewhat variable.    BM today, Discussed with nursing.  Increase dilaudid to 2-4 mg q3 hours.

## 2019-08-09 NOTE — PLAN OF CARE
POD3 L2-S1 decompression/fusion. VSS, placed on 2L at start of shift as pt was at 86% on room air. A/Ox4. Pt seems overwhelmed/tearful at times from 8/10 pain which goes from incision on back to L leg down to knee. Taking PO Dilaudid Q3H and scheduled Tylenol. R foot numbness/L foot tingling. Upper extremities 5/5 strength, 3-4/5 in lower extremities. Regular diet, no nausea. No IV access, pt tolerating PO fluids. Johnson removed this morning, due to void. Up with Ax1/GBW. Accepted to TCU bed at [facility].

## 2019-08-09 NOTE — PLAN OF CARE
POD#2 of an L2-S1 decompression. VSS on RA. CMS with numbness and weakness in RLE, PT mentioned patients right leg felt weaker today but pt says it is unchanged from yesterday. Having difficulty with ambulation because she can't feel her foot when standing, transferred to chair with Ax2/GB/W. RAJI pulled, dressing changed, CDI. Johnson patent, leave in until tomorrow per hospitalist. Active bowel sounds, did not want to take suppository but did take MOM and senna at bedtime. Writer spoke with [facility] this evening who says they will take the patient whenever she is ready for discharge.

## 2019-08-09 NOTE — PLAN OF CARE
Discharge Planner PT       Patient plan for discharge: TCU tomorrow  Current status: TCU now planned for tomorrow due to slow progress. Up in chair 1-1.5 before tx. Pt still very anxious about R LE numbness, discussed possible slow return and need to trust her strength for mobility. VSS weaned onto RA. Min-A sit<>stand, but requires multiple attempts due to discomfort from pain, assist for R LE placement. Able to ambulate into restroom this date, very slow and cautious with turns. Mod-A sit>supine, requires time for positioning for comfort, most comfortable on L side.    PM: pt in restroom for extended time during tx time with nursing assist of 2  Barriers to return to prior living situation: Current pain, level of assist, mobility status, lives alone  Recommendations for discharge: TCU  Rationale for recommendations: Will need ongoing skilled PT intervention to improve independence and safety with functional mobility skills prior to returning home.       Entered by: Dank Torres 08/09/2019 10:35 AM

## 2019-08-09 NOTE — PROGRESS NOTES
CM:    I: SW met with patient who confirms she requested a private room at [facility], knowing there is an additional fee and accepts this. [facility] has accepted patient for tomorrow. Transport has not been decided. Weekend SW will need to speak with patient regarding transport; watch for 02 needs at time of discharge as well. PAS completed and on patient's chart    P: Continue to assist as needed.    JULIO C Perkins

## 2019-08-09 NOTE — PLAN OF CARE
Discharge Planner OT   Patient plan for discharge: TCU  Current status: SBA supine to sit, Bakari sit to supine.  Pt declined any standing ADLs. Pt sat EOB for LB dressing tasks with AE, needing Bakari.  Pt limited session due to increased pain seated EOB.  Barriers to return to prior living situation: current level of assist, pain, weakness, lives alone in multi-level home  Recommendations for discharge: TCU  Rationale for recommendations: patient below baseline, and would benefit from continued therapy to increase safety and independence with ADLs/functional mobility       Entered by: ULISSES FLETCHER 08/09/2019 10:54 AM

## 2019-08-09 NOTE — PLAN OF CARE
POD 3 from a L2-S1 dec/fusion. A&O, forgetful. CMS RLE numbness and LLE tingling, BLE 4/5 but RLE slightly weaker than LLE. VSS except pt on 1-2 L O2, not on O2 at home, IS done often. Bowel sounds active, + flatus, +BM this shift. Voiding adequately. tolerating regular diet, fair appetite, needs encouragement to eat Dressing changed. Up with 1, GB, W - lots of encouragement to move/be up in chair given, education provided, pt still with minimal activity. C/o 7-8/10 pain, managed now with 2 tabs dilaudid with a little more relief. Pt scoring green on the Aggression Stop Light Tool. Plan to discharge to [facility] tomorrow.

## 2019-08-09 NOTE — PLAN OF CARE
PT: Pt just up to the chair, slowly working on breakfast. Plan to come back around 9:30 to give pt time in chair.

## 2019-08-09 NOTE — PROGRESS NOTES
[facility]    Medicine Progress Note - Hospitalist Service       Date of Admission:  8/6/2019  Assessment & Plan   Leonor Bernstein is a pleasant 69-year-old female with a past medical history of hypertension, GERD, depressive disorder, anemia, osteoporosis and sleep apnea who underwent a planned revision L2-S1 decompression and fusion on 08/06/2019.  The Hospitalist Service was consulted for routine postoperative medical co-management.      Revision decompression of bilateral L2-S1 and posterior spinal fusion L2-S1 - 8/6/19   The patient is doing well.  Denies any pain currently.  She is reporting increased numbness in the right lower extremity from the knee down to the foot.  Her numbness was present prior to surgery but is now more pronounced.    - monitor numbness  - Defer routine postop cares to the Orthopedic Spine Surgery team   - PT/OT consulted per primary service  - Dispo plan for TCU  - suggest Dilaudid PO over oxycodone due to renal dysfunction  - continue APAP 975mg TID scheduled  - stop IV pain meds as able and continue PO Dilaudid 2-4mg Q 3 hrs PRN, use lower dose as able to prevent over sedation and hypoxia in setting of sleep apnea (ok to use O2 at night as she declines CPAP)  - High doses of narcotics in this patient will lead to lethargy, hypoxia, and hypotension as well as lightheadedness and dizziness  - avoid benzos and other sedatives as able  - wean to RA, encourage aggressive pulmonary toilet with IS use and may use cough syrup or tessalon pearles, could try a neb, avoid over dosing of opiates, O2 ok at night time, monitor for any worsening hypoxia  - no calf pain, chest pain, tachycardia, or s/s PE    Calcium Gluconate IV infiltration 8/8/19  - given hyaluronidase per pharmacy recs     Acute blood loss anemia   Preop Hgb 12.9 and postop 11.4, now stable in the 10 range.  mL  - Hgb stable, monitor  - repeat BMP as outpatient            Recent Labs   Lab 08/08/19  0746  08/07/19  1359 08/07/19  0623 08/06/19  1628   HGB 9.8* 10.0* 10.1* 11.4*      Hypocalcemia: repleting PRN     Recent acute kidney injury     Seen in Care Everywhere.  Per pt, it was attributed to increased naproxen use which she stopped using.   Cr Trend: 6/3 1.28 --> 7/1 1.44--> 7/29 1.65--> 8/5 1.34--> 0.98-->1.15-->0.93-->0.91.  Previous baseline up until 4/2019 appears to be 0.9-1    - Would avoid NSAIDs going forward  - avoid nephrotoxins  - suggest Dilaudid PO over oxycodone due to renal dysfunction     Mild leukocytosis, resolved  WBC is mildly elevated at 13.3.  The patient is afebrile.  No infectious symptoms.  This is likely stress response from Surgery and resolved POD #1 to 10.5.     Hypertension.    The patient was recently taken off losartan placed on amlodipine 5 mg daily. Normotensive.   - Continue PTA amlodipine with hold parameters      Obstructive sleep apnea, not on CPAP    The patient does not tolerate CPAP.    - Recommend continuous pulse oximetry, capnography during this hospitalization.   - Weaned off O2     Depressive disorder.    - Resume PTA Wellbutrin and fluoxetine.    - Ativan available p.r.n.      Osteoporosis and vitamin D deficiency.    - Resume prior to admission vitamin D supplement, ortho increasing     Gastroesophageal reflux disease.  The patient is not on a PPI.     Diet: Advance Diet as Tolerated: Regular Diet Adult  Room Service    DVT Prophylaxis: Pneumatic Compression Devices and Defer to primary service  Johnson Catheter: not present  Code Status: Full Code      Disposition Plan   Expected discharge: Tomorrow, recommended to transitional care unit once adequate pain management/ tolerating PO medications, hemoglobin stable and safe disposition plan/ TCU bed available.  Entered: Betina Stover PA-C 08/09/2019, 2:03 PM       The patient's care was discussed with the Attending Physician, Dr. Muse, Bedside Nurse and Patient.    Betina Stover PA-C  Hospitalist  Service  [employer] [facility]    ______________________________________________________________________    Interval History   Feeling ok, c/o pain, better with higher dose Dilaudid. No N/V. Mildly hypoxic at times, bearing down and taking shallow breaths at times which is worsening hypoxia. Using IS but coughs during use. Monitored on RA and O2 sats 90-94%. Lungs CTAB.    Data reviewed today: I reviewed all medications, new labs and imaging results over the last 24 hours. I personally reviewed no images or EKG's today.    Physical Exam   Vital Signs: Temp: 98.1  F (36.7  C) Temp src: Oral BP: 129/78   Heart Rate: 85 Resp: 16 SpO2: 93 % O2 Device: Nasal cannula Oxygen Delivery: 2 LPM  Weight: 223 lbs 0 oz    General: Awake but very sleepy, falling asleep during interview, looks comfortable laying in bed. No acute distress.  HEENT: Normocephalic, atraumatic. Extraocular movements intact.    Respiratory: Clear to auscultation bilaterally, no rales, wheezing, or rhonchi to anterolateral fields.  Cardiovascular: Regular rate and rhythm, +S1 and S2, no murmur auscultated. No peripheral edema.   Gastrointestinal: Soft, non-tender, non-distended. Bowel sounds present. Rt sided RAJI drain noted.  Skin: Warm, dry. No obvious rashes or lesions on exposed skin.   Musculoskeletal: No joint swelling, erythema or tenderness. 5/5 strength to BLE but mildly weaker on RLE vs LLE.  Neurologic: AAO x3. Cranial nerves 2-12 grossly intact, normal strength and sensation.    Data   Recent Labs   Lab 08/09/19  0738 08/08/19  0746 08/07/19  1359 08/07/19  0623 08/06/19  1628   WBC  --   --   --  10.5 13.3*   HGB  --  9.8* 10.0* 10.1* 11.4*   MCV  --   --   --   --  95   PLT  --   --   --   --  236    140  --  140 142   POTASSIUM 3.8 4.1  --  4.5 4.0   CHLORIDE 104 111*  --  111* 112*   CO2 26 25  --  22 25   BUN 12 11  --  16 18   CR 0.91 0.93  --  1.15* 0.98   ANIONGAP 8 4  --  7 5   VICENTE 8.2* 7.7*  --  7.4* 7.6*   * 95  --   121* 116*     No results found for this or any previous visit (from the past 24 hour(s)).  Medications     No Medication Sleep Aids for this Patient         acetaminophen  975 mg Oral Q8H     amLODIPine  5 mg Oral Daily     buPROPion  150 mg Oral QAM     cholecalciferol  10,000 Units Oral Daily     FLUoxetine  60 mg Oral Daily     senna-docusate  1 tablet Oral BID    Or     senna-docusate  2 tablet Oral BID     vitamin D2  50,000 Units Oral Q Mon Wed Fri AM

## 2019-08-10 ENCOUNTER — APPOINTMENT (OUTPATIENT)
Dept: PHYSICAL THERAPY | Facility: CLINIC | Age: 69
DRG: 457 | End: 2019-08-10
Attending: ORTHOPAEDIC SURGERY
Payer: COMMERCIAL

## 2019-08-10 VITALS
HEIGHT: 65 IN | SYSTOLIC BLOOD PRESSURE: 129 MMHG | RESPIRATION RATE: 16 BRPM | HEART RATE: 68 BPM | BODY MASS INDEX: 37.15 KG/M2 | WEIGHT: 223 LBS | OXYGEN SATURATION: 92 % | DIASTOLIC BLOOD PRESSURE: 73 MMHG | TEMPERATURE: 98.4 F

## 2019-08-10 PROCEDURE — 25000132 ZZH RX MED GY IP 250 OP 250 PS 637: Performed by: PHYSICIAN ASSISTANT

## 2019-08-10 PROCEDURE — 97116 GAIT TRAINING THERAPY: CPT | Mod: GP

## 2019-08-10 PROCEDURE — 97530 THERAPEUTIC ACTIVITIES: CPT | Mod: GP

## 2019-08-10 PROCEDURE — 25000132 ZZH RX MED GY IP 250 OP 250 PS 637: Performed by: ORTHOPAEDIC SURGERY

## 2019-08-10 RX ORDER — LORAZEPAM 1 MG/1
1 TABLET ORAL AT BEDTIME
Qty: 30 TABLET | Refills: 0 | Status: SHIPPED | OUTPATIENT
Start: 2019-08-10 | End: 2022-05-19

## 2019-08-10 RX ORDER — ACETAMINOPHEN 325 MG/1
650 TABLET ORAL EVERY 4 HOURS PRN
COMMUNITY
Start: 2019-08-10 | End: 2022-05-19

## 2019-08-10 RX ORDER — HYDROMORPHONE HYDROCHLORIDE 2 MG/1
2-4 TABLET ORAL EVERY 4 HOURS PRN
Qty: 40 TABLET | Refills: 0 | Status: SHIPPED | OUTPATIENT
Start: 2019-08-10 | End: 2022-05-19

## 2019-08-10 RX ORDER — BISACODYL 10 MG
10 SUPPOSITORY, RECTAL RECTAL DAILY PRN
COMMUNITY
Start: 2019-08-10 | End: 2022-05-19

## 2019-08-10 RX ORDER — AMOXICILLIN 250 MG
1 CAPSULE ORAL 2 TIMES DAILY
Qty: 30 TABLET | Refills: 0 | Status: SHIPPED | OUTPATIENT
Start: 2019-08-10 | End: 2022-05-19

## 2019-08-10 RX ADMIN — ACETAMINOPHEN 650 MG: 325 TABLET, FILM COATED ORAL at 08:29

## 2019-08-10 RX ADMIN — FLUOXETINE 60 MG: 20 CAPSULE ORAL at 08:30

## 2019-08-10 RX ADMIN — HYDROMORPHONE HYDROCHLORIDE 4 MG: 2 TABLET ORAL at 04:21

## 2019-08-10 RX ADMIN — HYDROMORPHONE HYDROCHLORIDE 4 MG: 2 TABLET ORAL at 12:58

## 2019-08-10 RX ADMIN — ACETAMINOPHEN 650 MG: 325 TABLET, FILM COATED ORAL at 12:58

## 2019-08-10 RX ADMIN — AMLODIPINE BESYLATE 5 MG: 5 TABLET ORAL at 08:38

## 2019-08-10 RX ADMIN — BUPROPION HYDROCHLORIDE 150 MG: 150 TABLET, FILM COATED, EXTENDED RELEASE ORAL at 08:30

## 2019-08-10 RX ADMIN — ACETAMINOPHEN 650 MG: 325 TABLET, FILM COATED ORAL at 04:21

## 2019-08-10 RX ADMIN — CHOLECALCIFEROL CAP 125 MCG (5000 UNIT) 10000 UNITS: 125 CAP at 08:29

## 2019-08-10 RX ADMIN — HYDROMORPHONE HYDROCHLORIDE 4 MG: 2 TABLET ORAL at 08:30

## 2019-08-10 RX ADMIN — HYDROMORPHONE HYDROCHLORIDE 4 MG: 2 TABLET ORAL at 00:00

## 2019-08-10 ASSESSMENT — ACTIVITIES OF DAILY LIVING (ADL)
ADLS_ACUITY_SCORE: 16

## 2019-08-10 NOTE — PLAN OF CARE
OT: Per chart pt is transporting to TCU at 13:15, will defer further OT treatment to next level of care.       Occupational Therapy Discharge Summary    Reason for therapy discharge:    Discharged to transitional care facility.    Progress towards therapy goal(s). See goals on Care Plan in [facility] electronic health record for goal details.  Goals not met.  Barriers to achieving goals:   discharge from facility.    Therapy recommendation(s):    Continued therapy is recommended.  Rationale/Recommendations:  to maximize safety and IND in ADLs and IADLs. .    Pt not seen by therapist on this date, discharge based on previous therapy notes and recommendations

## 2019-08-10 NOTE — DISCHARGE SUMMARY
Admit Date:     08/06/2019   Discharge Date:           DIAGNOSIS:  Degenerative scoliosis, recurrent spinal stenosis and neurogenic claudication.      PROCEDURE THIS ADMISSION:  Lumbar decompression L3 to the sacrum, posterolateral lumbar fusion L2 to the sacrum with local autograft.      HISTORY OF PRESENT ILLNESS:  Ms. Bernstein is a 69-year-old woman who has a long history of low back and right leg pain.  Her symptoms were refractory to conservative treatment.  She is status post lumbar decompression in the past on 2 occasions with good results, but she developed recurrent symptoms.  Her preoperative evaluation revealed degenerative scoliosis, recurrent spinal stenosis most severe at L2-L3 and L3-L4 levels with marked osteoporosis.  It was recommended to her that she undergo operative treatment.  Because of the marked osteoporosis, no hardware could be used as she was at significant risk for junctional kyphosis, hardware failure, etc.      HOSPITAL COURSE:  On the day of admission, she underwent the previously mentioned procedures, tolerated the procedure without difficulty and was returned to the regular hospital pierce postoperatively.  Her postoperative course was significant for some increased right leg weakness and numbness compared to preoperative.  Preoperative exam was with paresthesias in an L5 distribution on the right side, but she had normal motor strength.  Her strength improved throughout the hospitalization.  At the time of discharge, she was still weak on the right side in her hip flexor and hamstring, but the strength below the knee had improved substantially. Her sensation was normal in the left leg and back to baseline and the right with numbness and tingling in the lateral calf and the dorsum of her foot.  At the time of discharge, her hemoglobin was stable at a level of 10.  She received no blood products during this admission.  At the time of discharge, she was needing assistance for  ambulation.  She was improving.  She had a bowel movement and was voiding without difficulty.  She was tolerating a regular adult diet, but her appetite was not good.  She was transferred to a rehab center for expected short-term stay on postoperative day #4.      At the time of this dictation, her medications have not been reviewed by the hospitalist, so there may be some changes, but she was started on calcium citrate 250 mg  to take 1 tablet 3 times a day with each meal.  She was started on Dilaudid 1-2 tablets p.o. q.6 hours p.r.n.  She was given 40 tablets.  It was hoped that she would gradually wean off this medication and be off of it a week postoperatively.  She was placed on Senna-S 1 tablet p.o. twice a day.  She should take this as long as she is taking the Dilaudid.  She was instructed to hold her 1 mg Ativan tablets that she was taking at bedtime until she is off of the Dilaudid medication.   She was instructed to resume her Ergocalciferol 50,000 units p.o. every Monday, Wednesday and Friday, her Cleocin 600 mg as needed before dental procedures, Wellbutrin- mg p.o. every morning, cyanocobalamin 1000 mcg injection into the muscle every 21 days, fluoxetine 20 mg p.o. 3 tablets each day for a total of 60 mg, Norvasc 5 mg p.o. each day, Tylenol  mg p.o. at bedtime.     Her vitamin D3 levels were checked on admission and found to be 32.  The ideal level for fusion healing is 50.  She was started on 5000 IU of cholecalciferol daily in addition to her ergocalciferol.     Her preoperative Naprosyn was discontinued due to its inhibitory effect on her fusion.      She will have physical and occupational therapy in the rehab center.  They should avoid bending and twisting her back to help her fusion to heal.  She will follow up in my clinic 6 weeks from the time of discharge.         GABRIEL PINON MD             D: 08/10/2019   T: 08/10/2019   MT: SUNNY      Name:     ANNABELLAAMERICA MCKOYE   MRN:       [id number]        Account:        [id number]   :      1950           Admit Date:     2019                                  Discharge Date:       Document: [id number]       cc: Raj Hayden MD

## 2019-08-10 NOTE — PLAN OF CARE
POD four from a L2-S1 dec/fusion. A&O, forgetful. CMS RLE numbness and LLE tingling at baseline but LLL tingling is gone and normal sensation this a.m.. BLE 4/5 but RLE slightly weaker than LLE. VSS except pt on 2 L O2, not on O2 at home but sleep apnea and okay for 02 with cont. Ox O/N., IS encouraged. Bowel sounds active, + flatus, +BM 8.9. Voiding adequately. tolerating regular diet, fair appetite, needs encouragement to eat. Dressing CDI. Up with 1, GB, W. C/o 7-8/10 pain, managed now with 2 tabs dilaudid/Tylenol with decrease and sleeping upon reassessment. Pt scoring green on the Aggression Stop Light Tool. Plan to discharge to [facility] today.

## 2019-08-10 NOTE — PLAN OF CARE
Discharge Planner PT   Patient plan for discharge: TCU today  Current status: Pt was up in a chair upon PT arrival. Pt requires CGA for sit<>stand from chair to FWW x 3 trials with cues for hand placement, breathing and upright posture once standing. Gait training with FWW and CGA x 75'. Sit>supine with Catalino for lifting LEs in to bed and cues for log roll.  Barriers to return to prior living situation: level of assist required, pain, limited mobility  Recommendations for discharge: TCU  Rationale for recommendations: Pt would benefit from PT at TCU to progress strength, balance and mobility so pt can return home safely.        Entered by: Elisabeth Hull 08/10/2019 8:55 AM

## 2019-08-10 NOTE — PLAN OF CARE
Physical Therapy Discharge Summary    Reason for therapy discharge:    Discharged to transitional care facility.    Progress towards therapy goal(s). See goals on Care Plan in [facility] electronic health record for goal details.  Goals not met.  Barriers to achieving goals:   discharge from facility.    Therapy recommendation(s):    Continued therapy is recommended.  Rationale/Recommendations:  PT at TCU to progress mobility.

## 2019-08-10 NOTE — DISCHARGE INSTRUCTIONS
INSTRUCTIONS FOR LUMBAR SURGICAL PATIENTS  Raj Doan M.D.--[facility]    POST-OPERATIVE INSTRUCTIONS (AFTER SURGERY):    INCISION/WOUND CARE:                1.   If you are discharged from the hospital with a dressing over your incision you may remove and replace it 2 days after leaving the hospital.     2.   If you have sutures that dissolve, the incision must be covered when showering for 5 days after surgery. On the 6th day, you may take a shower normally without covering the wound. But do not scrub the wound.     3.  The small pieces of tape over your incision are called Steri-strips, they can be   removed when they are loose or after 2 weeks.                         4.    If you have staples, your incision must remain dry and covered until they are removed 2 weeks after your surgery. Please call Rita at [phone] to make an appointment to have these removed when you have returned home from the hospital.    5.   Please look at your incisions daily and call Rita at [phone] immediately if you notice any redness, swelling, drainage, or if you develop a fever greater than 101. If after hours or weekends call [phone] and speak to the on-call physician.    6.  Avoid wearing tight clothing over your incisions.    7. Absolutely no bathtubs, hot tubs, swimming in pools or lakes, or any submersion/soaking before the incision is completely healed (no open areas or scabs are present).    POST OPERATIVE ACTIVITY LIMITATIONS:    1. No lifting over 10 pounds (gallon of milk) above your chest or below your waist.     2. Avoid repetitive twisting or bending i.e. raking, sweeping, shoveling etc.    3. Walking stimulates the healing process. Dr. Doan wants you to accomplish a minimum of 45 minutes of sustained walking per day for exercise. You are encouraged to walk several times a day and there is no limit on how far you can walk. In the beginning you may only be able to walk 5-15  minutes at a time that is okay just do this a minimum of 4-10x/day.  4. You may remove your white stockings (hank hose) for   hour twice a day. You may discontinue wearing the stockings when you are not spending any time in bed during the day and are walking at least hourly.    5. You should do the Adherent Nerve Root Stretch exercise 4-5 times a day; please see the attached instruction sheet for how to do these.    6. You may begin driving again when you are no longer taking the narcotic pain medication and feel comfortable with being able to navigate amongst other drivers.    7. You may return to work when you are no longer taking the narcotic pain medication. You must observe the 10-pound lifting restriction (nothing below waist or above chest), frequent change of position from sit, stand, and walking and avoid repetitive bending and twisting.    8. Advancement of physical activities will be discussed at each follow up appointment with Dr. Doan. Physical therapy, if needed, will also be discussed at each appointment.    9. Gentle sexual activity is okay. Be a passive participant.    POST OPERATIVE MEDICATIONS:    1. If your surgery INCLUDED A FUSION  DO NOT take Ibuprofen, Motrin, Advil, Aleve or any other anti-inflammatory medication or aspirin products for 3 months after surgery. This also includes any medication taken for chronic inflammatory conditions i.e. Methotrexate, Remicaid, Prednisone etc. unless otherwise instructed. Only Tylenol or Tylenol based medications are okay during this time frame.    2. If your surgery DID NOT include a fusion you may resume any over-the-counter     anti-inflammatories (Advil, Ibuprofen, Naproxen etc.) 3-5 days after the surgery.    3. We recommend 3-8 ounce glasses of milk daily and a daily supplement of Vitamin D 2000 i.u./day for fusion healing and bone growth.    4. Poor nutrition can lead to delayed wound healing and constipation. Good sources of lean proteins and fresh  fruits and vegetables will help with this.     5. Narcotic pain medications will also cause constipation. Using over the counter stool softeners, drinking plenty of fluids, ambulation, and good nutrition can help prevent this from occurring.   If you have any questions regarding these instructions please contact   Rita at [phone].

## 2019-08-10 NOTE — PROGRESS NOTES
WESLEY      D: SW following for discharge needs. Received discharge order. Patient accepted at [facility] for today. WESLEY met with patient and updated her on the above. Patient in agreement. Per patient request, WESLEY set up HE wheelchair ride at 1315. Faxed orders to [facility] (fax [phone]) and updated on transport time.     P: No further SW needs at this time.

## 2019-08-10 NOTE — PLAN OF CARE
POD #4 L2-S1 decompression/fusion. A&O x4. Neuros include BLE tingling that has improved since the surgery. VSS. Regular diet, thin liquids. Takes pills whole. Up with A1 + GB + walker + brace. Dilaudid and Tylenol given for incision pain. Pt scoring green on the Aggression Stop Light Tool. Patient discharged to TCU at 1315.

## 2019-10-03 ENCOUNTER — HEALTH MAINTENANCE LETTER (OUTPATIENT)
Age: 69
End: 2019-10-03

## 2020-01-29 ENCOUNTER — APPOINTMENT (OUTPATIENT)
Dept: GENERAL RADIOLOGY | Facility: CLINIC | Age: 70
End: 2020-01-29
Attending: EMERGENCY MEDICINE
Payer: COMMERCIAL

## 2020-01-29 ENCOUNTER — HOSPITAL ENCOUNTER (EMERGENCY)
Facility: CLINIC | Age: 70
Discharge: HOME | End: 2020-01-29
Attending: EMERGENCY MEDICINE | Admitting: EMERGENCY MEDICINE
Payer: COMMERCIAL

## 2020-01-29 ENCOUNTER — APPOINTMENT (OUTPATIENT)
Dept: MRI IMAGING | Facility: CLINIC | Age: 70
End: 2020-01-29
Attending: EMERGENCY MEDICINE
Payer: COMMERCIAL

## 2020-01-29 ENCOUNTER — APPOINTMENT (OUTPATIENT)
Dept: CT IMAGING | Facility: CLINIC | Age: 70
End: 2020-01-29
Attending: EMERGENCY MEDICINE
Payer: COMMERCIAL

## 2020-01-29 VITALS
HEART RATE: 68 BPM | WEIGHT: 220 LBS | SYSTOLIC BLOOD PRESSURE: 162 MMHG | BODY MASS INDEX: 36.65 KG/M2 | DIASTOLIC BLOOD PRESSURE: 86 MMHG | TEMPERATURE: 97.8 F | HEIGHT: 65 IN | OXYGEN SATURATION: 98 % | RESPIRATION RATE: 16 BRPM
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
## 2020-01-29 DIAGNOSIS — M79.642 PAIN OF LEFT HAND: ICD-10-CM

## 2020-01-29 DIAGNOSIS — N39.0 URINARY TRACT INFECTION WITHOUT HEMATURIA, SITE UNSPECIFIED: ICD-10-CM

## 2020-01-29 DIAGNOSIS — M79.641 PAIN OF RIGHT HAND: ICD-10-CM

## 2020-01-29 LAB
ALBUMIN SERPL-MCNC: 3.3 G/DL (ref 3.4–5)
ALBUMIN UR-MCNC: NEGATIVE MG/DL
ALP SERPL-CCNC: 95 U/L (ref 40–150)
ALT SERPL W P-5'-P-CCNC: 18 U/L (ref 0–50)
ANION GAP SERPL CALCULATED.3IONS-SCNC: 6 MMOL/L (ref 3–14)
APPEARANCE UR: CLEAR
AST SERPL W P-5'-P-CCNC: 17 U/L (ref 0–45)
BACTERIA #/AREA URNS HPF: ABNORMAL /HPF
BASOPHILS # BLD AUTO: 0 10E9/L (ref 0–0.2)
BASOPHILS NFR BLD AUTO: 0.6 %
BILIRUB SERPL-MCNC: 0.9 MG/DL (ref 0.2–1.3)
BILIRUB UR QL STRIP: NEGATIVE
BUN SERPL-MCNC: 27 MG/DL (ref 7–30)
CALCIUM SERPL-MCNC: 8.2 MG/DL (ref 8.5–10.1)
CHLORIDE SERPL-SCNC: 109 MMOL/L (ref 94–109)
CO2 SERPL-SCNC: 23 MMOL/L (ref 20–32)
COLOR UR AUTO: ABNORMAL
CREAT SERPL-MCNC: 1.17 MG/DL (ref 0.52–1.04)
DIFFERENTIAL METHOD BLD: ABNORMAL
EOSINOPHIL # BLD AUTO: 0.3 10E9/L (ref 0–0.7)
EOSINOPHIL NFR BLD AUTO: 4 %
ERYTHROCYTE [DISTWIDTH] IN BLOOD BY AUTOMATED COUNT: 15.9 % (ref 10–15)
GFR SERPL CREATININE-BSD FRML MDRD: 48 ML/MIN/{1.73_M2}
GLUCOSE SERPL-MCNC: 95 MG/DL (ref 70–99)
GLUCOSE UR STRIP-MCNC: NEGATIVE MG/DL
HCT VFR BLD AUTO: 34.3 % (ref 35–47)
HGB BLD-MCNC: 10.6 G/DL (ref 11.7–15.7)
HGB UR QL STRIP: ABNORMAL
IMM GRANULOCYTES # BLD: 0 10E9/L (ref 0–0.4)
IMM GRANULOCYTES NFR BLD: 0.2 %
KETONES UR STRIP-MCNC: NEGATIVE MG/DL
LEUKOCYTE ESTERASE UR QL STRIP: ABNORMAL
LYMPHOCYTES # BLD AUTO: 0.8 10E9/L (ref 0.8–5.3)
LYMPHOCYTES NFR BLD AUTO: 13.6 %
MCH RBC QN AUTO: 26 PG (ref 26.5–33)
MCHC RBC AUTO-ENTMCNC: 30.9 G/DL (ref 31.5–36.5)
MCV RBC AUTO: 84 FL (ref 78–100)
MONOCYTES # BLD AUTO: 0.7 10E9/L (ref 0–1.3)
MONOCYTES NFR BLD AUTO: 10.7 %
NEUTROPHILS # BLD AUTO: 4.4 10E9/L (ref 1.6–8.3)
NEUTROPHILS NFR BLD AUTO: 70.9 %
NITRATE UR QL: POSITIVE
NRBC # BLD AUTO: 0 10*3/UL
NRBC BLD AUTO-RTO: 0 /100
PH UR STRIP: 7 PH (ref 5–7)
PLATELET # BLD AUTO: 346 10E9/L (ref 150–450)
POTASSIUM SERPL-SCNC: 3.7 MMOL/L (ref 3.4–5.3)
PROT SERPL-MCNC: 7 G/DL (ref 6.8–8.8)
RBC # BLD AUTO: 4.08 10E12/L (ref 3.8–5.2)
RBC #/AREA URNS AUTO: <1 /HPF (ref 0–2)
SODIUM SERPL-SCNC: 138 MMOL/L (ref 133–144)
SOURCE: ABNORMAL
SP GR UR STRIP: 1.01 (ref 1–1.03)
SQUAMOUS #/AREA URNS AUTO: 1 /HPF (ref 0–1)
UROBILINOGEN UR STRIP-MCNC: NORMAL MG/DL (ref 0–2)
WBC # BLD AUTO: 6.2 10E9/L (ref 4–11)
WBC #/AREA URNS AUTO: 10 /HPF (ref 0–5)

## 2020-01-29 PROCEDURE — 85025 COMPLETE CBC W/AUTO DIFF WBC: CPT | Performed by: EMERGENCY MEDICINE

## 2020-01-29 PROCEDURE — 96374 THER/PROPH/DIAG INJ IV PUSH: CPT

## 2020-01-29 PROCEDURE — 25800030 ZZH RX IP 258 OP 636: Performed by: EMERGENCY MEDICINE

## 2020-01-29 PROCEDURE — 25000128 H RX IP 250 OP 636: Performed by: EMERGENCY MEDICINE

## 2020-01-29 PROCEDURE — 96361 HYDRATE IV INFUSION ADD-ON: CPT

## 2020-01-29 PROCEDURE — 81001 URINALYSIS AUTO W/SCOPE: CPT | Performed by: EMERGENCY MEDICINE

## 2020-01-29 PROCEDURE — 73110 X-RAY EXAM OF WRIST: CPT | Mod: 50

## 2020-01-29 PROCEDURE — 72141 MRI NECK SPINE W/O DYE: CPT

## 2020-01-29 PROCEDURE — 73090 X-RAY EXAM OF FOREARM: CPT | Mod: RT

## 2020-01-29 PROCEDURE — 96375 TX/PRO/DX INJ NEW DRUG ADDON: CPT

## 2020-01-29 PROCEDURE — 80053 COMPREHEN METABOLIC PANEL: CPT | Performed by: EMERGENCY MEDICINE

## 2020-01-29 PROCEDURE — 99285 EMERGENCY DEPT VISIT HI MDM: CPT | Mod: 25

## 2020-01-29 PROCEDURE — 25000132 ZZH RX MED GY IP 250 OP 250 PS 637: Performed by: EMERGENCY MEDICINE

## 2020-01-29 PROCEDURE — 72125 CT NECK SPINE W/O DYE: CPT

## 2020-01-29 RX ORDER — CIPROFLOXACIN 500 MG/1
500 TABLET, FILM COATED ORAL 2 TIMES DAILY
Qty: 14 TABLET | Refills: 0 | Status: SHIPPED | OUTPATIENT
Start: 2020-01-29 | End: 2020-02-05

## 2020-01-29 RX ORDER — HYDROCODONE BITARTRATE AND ACETAMINOPHEN 5; 325 MG/1; MG/1
1 TABLET ORAL ONCE
Status: COMPLETED | OUTPATIENT
Start: 2020-01-29 | End: 2020-01-29

## 2020-01-29 RX ORDER — ONDANSETRON 2 MG/ML
4 INJECTION INTRAMUSCULAR; INTRAVENOUS EVERY 30 MIN PRN
Status: DISCONTINUED | OUTPATIENT
Start: 2020-01-29 | End: 2020-01-29 | Stop reason: HOSPADM

## 2020-01-29 RX ORDER — MORPHINE SULFATE 4 MG/ML
4 INJECTION, SOLUTION INTRAMUSCULAR; INTRAVENOUS
Status: COMPLETED | OUTPATIENT
Start: 2020-01-29 | End: 2020-01-29

## 2020-01-29 RX ADMIN — MORPHINE SULFATE 4 MG: 4 INJECTION INTRAVENOUS at 08:06

## 2020-01-29 RX ADMIN — ONDANSETRON 4 MG: 2 INJECTION INTRAMUSCULAR; INTRAVENOUS at 06:31

## 2020-01-29 RX ADMIN — SODIUM CHLORIDE 1000 ML: 9 INJECTION, SOLUTION INTRAVENOUS at 06:28

## 2020-01-29 RX ADMIN — HYDROCODONE BITARTRATE AND ACETAMINOPHEN 1 TABLET: 5; 325 TABLET ORAL at 06:31

## 2020-01-29 ASSESSMENT — ENCOUNTER SYMPTOMS
MYALGIAS: 1
DIZZINESS: 0
NECK PAIN: 0

## 2020-01-29 ASSESSMENT — MIFFLIN-ST. JEOR: SCORE: 1528.79

## 2020-01-29 NOTE — ED AVS SNAPSHOT
Emergency Department  [address] [place] [address]  Phone:  [phone]  Fax:  [phone]                                    Leonor Bernstein   MRN: [id number]    Department:   Emergency Department   Date of Visit:  1/29/2020           After Visit Summary Signature Page    I have received my discharge instructions, and my questions have been answered. I have discussed any challenges I see with this plan with the nurse or doctor.    ..........................................................................................................................................  Patient/Patient Representative Signature      ..........................................................................................................................................  Patient Representative Print Name and Relationship to Patient    ..................................................               ................................................  Date                                   Time    ..........................................................................................................................................  Reviewed by Signature/Title    ...................................................              ..............................................  Date                                               Time          22EPIC Rev 08/18

## 2020-01-29 NOTE — ED NOTES
Bed: ED02  Expected date: 1/29/20  Expected time: 6:00 AM  Means of arrival: Ambulance  Comments:  HEMS 426 69F fall; bilat. Arm pain

## 2020-01-29 NOTE — ED TRIAGE NOTES
On the way to the [facility] and tripped.  Bruises on knees and arms.  Baseline hairline fx on right arm but worsening pain now.  Hit her nose on the fall: nose bleed initially, but subsided now.

## 2020-01-29 NOTE — ED PROVIDER NOTES
History     Chief Complaint:  Fall and Arm Pain      HPI   Leonor Bernstein is a 69 year old female who presents after a fall with arm pain. The patient says that she woke up after 0500 to use the restroom when she lost her balance and fell. The patient says that she did not trip or get dizzy prior to the fall. She says that she landed on her hands and face, but says that she did not hit her head or lose consciousness and she was able to get up and walk afterwards. The patient say that she initially had a nosebleed, but it was able to be controlled prior to arrival. She says that the worst of the pain is in both of her hands and she says that they are tingling. She denies hitting her head or neck, any neck pain, or any use of blood thinners.     Allergies:  Ambien  Penicillins      Medications:    Senokot  Ativan  Dilaudid  Fluoxetine  Cyanocobalamin  Cleocin  Wellbutrin  Dulcolax  Norvasc      Past Medical History:    Sleep apnea  Sciatica  RLS  Osteoporosis  Obese  hypertension  Hemorrhoids  GERD  Depressive disorder  Arthritis  Anemia  Allergic rhinitis  Spinal stenosis  Degenerative scoliosis     Past Surgical History:    Hip replacement  Optical tracking system fusion spine posterior lumbar  Laminectomy lumbar one level  Laminect/discectomy, lumbar  Tubal ligation  Ovarian cystectomy   Gastric bypass  Decompression lumbar two levels  Colonoscopy  Cholecystectomy     Family History:    Family history reviewed. No pertinent family history.     Social History:  Smoking Status: Former Smoker  Smokeless Tobacco: Never Used  Alcohol Use: Positive  Drug Use: Negative  PCP: Mini Hayden   Marital Status:        Review of Systems   HENT: Positive for nosebleeds (resolved).    Musculoskeletal: Positive for myalgias. Negative for neck pain.   Neurological: Negative for dizziness and syncope.        Tingling in bilateral hands   All other systems reviewed and are negative.        Physical Exam     Patient  "Vitals for the past 24 hrs:   BP Temp Temp src Heart Rate Resp SpO2 Height Weight   01/29/20 0810 (!) 193/89 -- -- -- -- 99 % -- --   01/29/20 0611 123/74 97.8  F (36.6  C) Oral 70 16 98 % 1.651 m (5' 5\") 99.8 kg (220 lb)        Physical Exam  Vitals: reviewed by me  General: Pt seen on [facility], cooperative, and alert to conversation  Eyes: Tracking well, clear conjunctiva BL  ENT: MMM, midline trachea.  No C-spine tenderness to palpation, full range of motion to neck.  Lungs:   No tachypnea, no accessory muscle use. No respiratory distress.   CV: Rate as above, regular rhythm.    Abd: Soft, non tender, no guarding, no rebound. Non distended  MSK: no peripheral edema or joint effusion.  Bilateral wrists and hands are with tenderness to palpation throughout, no skin changes in this area.  Full range of motion to bilateral shoulders, elbows, wrists albeit slowly.  No tenderness to palpation to bilateral hips, knees, ankles.  Able to walk with strong and steady gait.  Skin: No rash, normal turgor and temperature  Neuro: Clear speech and no facial droop.  Psych: Not RIS, no e/o AH/VH      Emergency Department Course     Imaging:  Radiology findings were communicated with the patient who voiced understanding of the findings.    Cervical spine MRI w/o contrast  Severe, diffuse degenerative changes throughout the  cervical spine as detailed above. No evidence for fracture or  traumatic malalignment.   Reading per radiology    Cervical spine CT w/o contrast  1. No evidence of acute fracture or subluxation in the cervical spine.  2. Degenerative changes in the cervical spine as described above.  STEPHEN GALLARDO MD  Reading per radiology    XR Wrist Bilateral G/E 3 Views  Normal left wrist alignment and joint spaces. No acute fracture is evident.  Normal right wrist alignment. Severe degenerative change of the right wrist triscaphe joint and mild degenerative change of the first CMC joint. There is a " corticated ossific body at the proximal base of the first metacarpal at the first CMC joint, along   ulnar aspect. This appears well-corticated and is probably sequela of previous trauma rather than acute fracture. No acute appearing fracture is seen.  Reading per radiology    Radius/Ulna XR, PA & LAT, right  Normal alignment of the forearm. No definite acute fracture. There is a subtle oblique lucency in the distal radius on AP view, without an obvious cortical break, although apparent irregular margin of the dorsal distal radius metaphysis on   lateral view is noted. This is does not appear to be a fracture on comparison wrist views and is favored to be an artifact, however correlation for point tenderness in this region is suggested.  Reading per radiology    Laboratory:  Laboratory findings were communicated with the patient who voiced understanding of the findings.    CBC: WBC 6.2, HGB 10.6 (L),   CMP: creatinine 1.17 (H), GFR 48 (L), calcium 2 (L), albumin 3.3 (L) /w WNL    UA with micro: blood trace (A), nitrite positive (A), Leukocyte esterase large (A), WBC 10 (H), bacteria few (A) o/w negative     Interventions:  0628 NS 1 L IV  0631 Norco 1 tablet Oral    Zofran 4 mg IV   0806 morphine 4 mg IV     Emergency Department Course:    0613 Nursing notes and vitals reviewed.    0615 I performed an exam of the patient as documented above.     0625 IV was inserted and blood was drawn for laboratory testing, results above.     0633 The patient was sent for a XR while in the emergency department, results above.      0708 Patient rechecked and updated.      0727 The patient was sent for a CT while in the emergency department, results above.      0808 Patient rechecked and updated.      0822 The patient provided a urine sample here in the emergency department. This was sent for laboratory testing, findings above.     0849 The patient was sent for a MR while in the emergency department, results above.       0956 Patient rechecked and updated.       The patient is discharged to home.     Impression & Plan      Medical Decision Making:  Leonor Bernstein is a 69 year old female who presents to the emergency department today for evaluation of what appears to be pain in both hands after a fall today. She did not have any loss of consciousness, her labs were unremarkable, she does have a mild UTI and this might be part of why she fell she states. She is mentating appropriately, has no neck pain, but given the fall and neuropathic pain pattern in a glove like distribution including both upper extremities with normal XR's, I did think that an MRI was indicated to formally rule out any type of cord contusion as this would have explained her paraesthesias like pain. 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 I see no reason to keep her in the hospital otherwise her workup is negative and I do think she can go home safely. She will be discharged as above.         Diagnosis:    ICD-10-CM    1. Fall, initial encounter W19.XXXA    2. Pain of left hand M79.642    3. Pain of right hand M79.641    4. Urinary tract infection without hematuria, site unspecified N39.0      Disposition:   Findings and plan explained to the Patient. Patient discharged home with instructions regarding supportive care, medications, and reasons to return. The importance of close follow-up was reviewed.     Discharge Medications:  New Prescriptions    No medications on file       Scribe Disclosure:  I, Andrea Mojica, am serving as a scribe at 6:14 AM on 1/29/2020 to document services personally performed by Paddy Mccloud based on my observations and the provider's statements to me.      EMERGENCY DEPARTMENT       Paddy Mccloud MD  01/29/20 6157

## 2020-02-08 ENCOUNTER — HEALTH MAINTENANCE LETTER (OUTPATIENT)
Age: 70
End: 2020-02-08

## 2020-11-07 ENCOUNTER — HEALTH MAINTENANCE LETTER (OUTPATIENT)
Age: 70
End: 2020-11-07

## 2021-03-27 ENCOUNTER — HEALTH MAINTENANCE LETTER (OUTPATIENT)
Age: 71
End: 2021-03-27

## 2021-09-05 ENCOUNTER — HEALTH MAINTENANCE LETTER (OUTPATIENT)
Age: 71
End: 2021-09-05

## 2021-10-31 ENCOUNTER — HEALTH MAINTENANCE LETTER (OUTPATIENT)
Age: 71
End: 2021-10-31

## 2022-02-16 ENCOUNTER — TRANSFERRED RECORDS (OUTPATIENT)
Dept: HEALTH INFORMATION MANAGEMENT | Facility: CLINIC | Age: 72
End: 2022-02-16
Payer: COMMERCIAL

## 2022-04-10 DIAGNOSIS — Z11.59 ENCOUNTER FOR SCREENING FOR OTHER VIRAL DISEASES: Primary | ICD-10-CM

## 2022-04-17 ENCOUNTER — HEALTH MAINTENANCE LETTER (OUTPATIENT)
Age: 72
End: 2022-04-17

## 2022-05-17 ENCOUNTER — LAB (OUTPATIENT)
Dept: LAB | Facility: CLINIC | Age: 72
End: 2022-05-17
Attending: ORTHOPAEDIC SURGERY
Payer: COMMERCIAL

## 2022-05-17 DIAGNOSIS — Z11.59 ENCOUNTER FOR SCREENING FOR OTHER VIRAL DISEASES: ICD-10-CM

## 2022-05-17 PROCEDURE — U0003 INFECTIOUS AGENT DETECTION BY NUCLEIC ACID (DNA OR RNA); SEVERE ACUTE RESPIRATORY SYNDROME CORONAVIRUS 2 (SARS-COV-2) (CORONAVIRUS DISEASE [COVID-19]), AMPLIFIED PROBE TECHNIQUE, MAKING USE OF HIGH THROUGHPUT TECHNOLOGIES AS DESCRIBED BY CMS-2020-01-R: HCPCS

## 2022-05-17 PROCEDURE — U0005 INFEC AGEN DETEC AMPLI PROBE: HCPCS

## 2022-05-17 NOTE — PROGRESS NOTES
Pre-op Total Joint Patient Screening    1. Do you have a ride available to come to the hospital the day after your surgery by 8am with anticipated discharge of 11am? Y-pt's ride may not be available before 11am or by 8am on 5/21.   2. What is the name of this person? Gerson Travis (brother)  3. Do you have a  set up after surgery? Y  4. Will your  be the same person that gives you a ride home after surgery? : Vincent Travis (brother)  5. Have you received the Joint Replacement Guidebook? Y  6. Do you have any questions about your guidebook? N  7. Have you activated your [employer] account? Y  8. Have you signed up for MY Chart access? Y

## 2022-05-18 LAB — SARS-COV-2 RNA RESP QL NAA+PROBE: NEGATIVE

## 2022-05-19 RX ORDER — PNV NO.95/FERROUS FUM/FOLIC AC 28MG-0.8MG
1 TABLET ORAL
COMMUNITY

## 2022-05-19 NOTE — PROGRESS NOTES
PTA medications updated by Medication Scribe prior to surgery via phone call with patient (last doses completed by Nurse)     Medication history sources: Patient, Surescripts and H&P  In the past week, patient estimated taking medication this percent of the time: Greater than 90%  Adherence assessment: N/A Not Observed    Significant changes made to the medication list:  Patient reports no longer taking the following meds (med scribe removed from PTA med list): Acetaminophen, Bisacodyl, Cholecalciferol, Clindamycin, Tylenol PM, Hydromorphone, Lorazepam, Senna-Docusate, Calcium Phosphate-Vitamin D3, Ergocalciferol      Additional medication history information:   None    Medication reconciliation completed by provider prior to medication history? No    Time spent in this activity: 40 minutes    The information provided in this note is only as accurate as the sources available at the time of update(s)    Prior to Admission medications    Medication Sig Last Dose Taking? Auth Provider   amLODIPine (NORVASC) 5 MG tablet Take 5 mg by mouth daily  at am Yes Reported, Patient   buPROPion (WELLBUTRIN XL) 150 MG 24 hr tablet Take 150 mg by mouth every morning  at am Yes Reported, Patient   cyanocobalamin (CYANOCOBALAMIN) 1000 MCG/ML injection Inject 1 mL into the muscle every 21 days  MAY 2022 Yes Reported, Patient   Ferrous Sulfate (IRON) 325 (65 Fe) MG tablet Take 1 tablet by mouth daily 5/19/2022 at am Yes Reported, Patient   FLUoxetine 20 MG tablet Take 60 mg by mouth daily (3 x 20 mg = 60 mg)  at am Yes Reported, Patient   Multiple Vitamins-Minerals (PRESERVISION AREDS PO) Take 2 capsules by mouth daily 5/19/2022 at am Yes Reported, Patient     Medication history completed by:    Regulo Hurtado CPhT  Medication Scribe  [facility]

## 2022-05-20 ENCOUNTER — APPOINTMENT (OUTPATIENT)
Dept: GENERAL RADIOLOGY | Facility: CLINIC | Age: 72
End: 2022-05-20
Attending: ORTHOPAEDIC SURGERY
Payer: COMMERCIAL

## 2022-05-20 ENCOUNTER — ANESTHESIA (OUTPATIENT)
Dept: SURGERY | Facility: CLINIC | Age: 72
End: 2022-05-20
Payer: COMMERCIAL

## 2022-05-20 ENCOUNTER — ANESTHESIA EVENT (OUTPATIENT)
Dept: SURGERY | Facility: CLINIC | Age: 72
End: 2022-05-20
Payer: COMMERCIAL

## 2022-05-20 ENCOUNTER — HOSPITAL ENCOUNTER (OUTPATIENT)
Facility: CLINIC | Age: 72
Discharge: HOME | End: 2022-05-21
Attending: ORTHOPAEDIC SURGERY | Admitting: ORTHOPAEDIC SURGERY
Payer: COMMERCIAL

## 2022-05-20 ENCOUNTER — APPOINTMENT (OUTPATIENT)
Dept: PHYSICAL THERAPY | Facility: CLINIC | Age: 72
End: 2022-05-20
Attending: ORTHOPAEDIC SURGERY
Payer: COMMERCIAL

## 2022-05-20 DIAGNOSIS — Z96.651 S/P TOTAL KNEE ARTHROPLASTY, RIGHT: Primary | ICD-10-CM

## 2022-05-20 PROBLEM — M17.11 OSTEOARTHRITIS OF RIGHT KNEE: Status: ACTIVE | Noted: 2022-05-20

## 2022-05-20 LAB
CREAT SERPL-MCNC: 1.03 MG/DL (ref 0.52–1.04)
GFR SERPL CREATININE-BSD FRML MDRD: 58 ML/MIN/1.73M2
HGB BLD-MCNC: 14.2 G/DL (ref 11.7–15.7)
POTASSIUM BLD-SCNC: 3.9 MMOL/L (ref 3.4–5.3)

## 2022-05-20 PROCEDURE — 258N000003 HC RX IP 258 OP 636: Performed by: SURGERY

## 2022-05-20 PROCEDURE — 250N000013 HC RX MED GY IP 250 OP 250 PS 637: Performed by: SPECIALIST/TECHNOLOGIST

## 2022-05-20 PROCEDURE — 278N000051 HC OR IMPLANT GENERAL: Performed by: ORTHOPAEDIC SURGERY

## 2022-05-20 PROCEDURE — 272N000282 HC OR IOM SUPPLIES OPNP: Performed by: ORTHOPAEDIC SURGERY

## 2022-05-20 PROCEDURE — 97110 THERAPEUTIC EXERCISES: CPT | Mod: GP

## 2022-05-20 PROCEDURE — 250N000011 HC RX IP 250 OP 636: Performed by: ORTHOPAEDIC SURGERY

## 2022-05-20 PROCEDURE — 82565 ASSAY OF CREATININE: CPT | Performed by: SURGERY

## 2022-05-20 PROCEDURE — 97116 GAIT TRAINING THERAPY: CPT | Mod: GP

## 2022-05-20 PROCEDURE — 710N000009 HC RECOVERY PHASE 1, LEVEL 1, PER MIN: Performed by: ORTHOPAEDIC SURGERY

## 2022-05-20 PROCEDURE — 85018 HEMOGLOBIN: CPT | Performed by: ORTHOPAEDIC SURGERY

## 2022-05-20 PROCEDURE — 250N000011 HC RX IP 250 OP 636: Performed by: SPECIALIST/TECHNOLOGIST

## 2022-05-20 PROCEDURE — 36415 COLL VENOUS BLD VENIPUNCTURE: CPT | Performed by: ORTHOPAEDIC SURGERY

## 2022-05-20 PROCEDURE — 258N000003 HC RX IP 258 OP 636: Performed by: SPECIALIST/TECHNOLOGIST

## 2022-05-20 PROCEDURE — 250N000013 HC RX MED GY IP 250 OP 250 PS 637

## 2022-05-20 PROCEDURE — 97161 PT EVAL LOW COMPLEX 20 MIN: CPT | Mod: GP

## 2022-05-20 PROCEDURE — 360N000077 HC SURGERY LEVEL 4, PER MIN: Performed by: ORTHOPAEDIC SURGERY

## 2022-05-20 PROCEDURE — 250N000011 HC RX IP 250 OP 636

## 2022-05-20 PROCEDURE — 250N000009 HC RX 250: Performed by: NURSE ANESTHETIST, CERTIFIED REGISTERED

## 2022-05-20 PROCEDURE — 250N000011 HC RX IP 250 OP 636: Performed by: SURGERY

## 2022-05-20 PROCEDURE — 258N000003 HC RX IP 258 OP 636

## 2022-05-20 PROCEDURE — 999N000141 HC STATISTIC PRE-PROCEDURE NURSING ASSESSMENT: Performed by: ORTHOPAEDIC SURGERY

## 2022-05-20 PROCEDURE — 99207 PR NO CHARGE LOS: CPT | Performed by: PHYSICIAN ASSISTANT

## 2022-05-20 PROCEDURE — 250N000009 HC RX 250: Performed by: ORTHOPAEDIC SURGERY

## 2022-05-20 PROCEDURE — 84132 ASSAY OF SERUM POTASSIUM: CPT | Performed by: SURGERY

## 2022-05-20 PROCEDURE — 272N000001 HC OR GENERAL SUPPLY STERILE: Performed by: ORTHOPAEDIC SURGERY

## 2022-05-20 PROCEDURE — 370N000017 HC ANESTHESIA TECHNICAL FEE, PER MIN: Performed by: ORTHOPAEDIC SURGERY

## 2022-05-20 PROCEDURE — 258N000003 HC RX IP 258 OP 636: Performed by: NURSE ANESTHETIST, CERTIFIED REGISTERED

## 2022-05-20 PROCEDURE — 250N000025 HC SEVOFLURANE, PER MIN: Performed by: ORTHOPAEDIC SURGERY

## 2022-05-20 PROCEDURE — C1776 JOINT DEVICE (IMPLANTABLE): HCPCS | Performed by: ORTHOPAEDIC SURGERY

## 2022-05-20 PROCEDURE — 250N000011 HC RX IP 250 OP 636: Performed by: NURSE ANESTHETIST, CERTIFIED REGISTERED

## 2022-05-20 PROCEDURE — 999N000063 XR KNEE PORT RIGHT 1/2 VIEWS: Mod: RT

## 2022-05-20 DEVICE — UNIVERSAL TIBIAL BASEPLATE
Type: IMPLANTABLE DEVICE | Site: KNEE | Status: FUNCTIONAL
Brand: TRIATHLON

## 2022-05-20 DEVICE — CRUCIATE RETAINING FEMORAL
Type: IMPLANTABLE DEVICE | Site: KNEE | Status: FUNCTIONAL
Brand: TRIATHLON

## 2022-05-20 DEVICE — PATELLA
Type: IMPLANTABLE DEVICE | Site: KNEE | Status: FUNCTIONAL
Brand: TRIATHLON

## 2022-05-20 DEVICE — TIBIAL BEARING INSERT - CS
Type: IMPLANTABLE DEVICE | Site: KNEE | Status: FUNCTIONAL
Brand: TRIATHLON

## 2022-05-20 DEVICE — BONE CEMENT SIMPLEX FULL DOSE 6191-1-001: Type: IMPLANTABLE DEVICE | Site: KNEE | Status: FUNCTIONAL

## 2022-05-20 RX ORDER — LIDOCAINE HYDROCHLORIDE 20 MG/ML
INJECTION, SOLUTION INFILTRATION; PERINEURAL PRN
Status: DISCONTINUED | OUTPATIENT
Start: 2022-05-20 | End: 2022-05-20

## 2022-05-20 RX ORDER — LIDOCAINE 40 MG/G
CREAM TOPICAL
Status: DISCONTINUED | OUTPATIENT
Start: 2022-05-20 | End: 2022-05-21 | Stop reason: HOSPADM

## 2022-05-20 RX ORDER — BISACODYL 10 MG
10 SUPPOSITORY, RECTAL RECTAL DAILY PRN
Status: DISCONTINUED | OUTPATIENT
Start: 2022-05-20 | End: 2022-05-21 | Stop reason: HOSPADM

## 2022-05-20 RX ORDER — ASPIRIN 81 MG/1
81 TABLET ORAL 2 TIMES DAILY
Status: DISCONTINUED | OUTPATIENT
Start: 2022-05-20 | End: 2022-05-21 | Stop reason: HOSPADM

## 2022-05-20 RX ORDER — ACETAMINOPHEN 325 MG/1
650 TABLET ORAL EVERY 4 HOURS PRN
Status: DISCONTINUED | OUTPATIENT
Start: 2022-05-23 | End: 2022-05-21 | Stop reason: HOSPADM

## 2022-05-20 RX ORDER — ASPIRIN 81 MG/1
81 TABLET ORAL 2 TIMES DAILY
Qty: 60 TABLET | Refills: 0 | Status: ON HOLD | OUTPATIENT
Start: 2022-05-20 | End: 2022-10-15

## 2022-05-20 RX ORDER — POLYETHYLENE GLYCOL 3350 17 G/17G
1 POWDER, FOR SOLUTION ORAL DAILY
Qty: 7 PACKET | Refills: 0 | Status: ON HOLD | OUTPATIENT
Start: 2022-05-20 | End: 2022-10-15

## 2022-05-20 RX ORDER — NEOSTIGMINE METHYLSULFATE 1 MG/ML
VIAL (ML) INJECTION PRN
Status: DISCONTINUED | OUTPATIENT
Start: 2022-05-20 | End: 2022-05-20

## 2022-05-20 RX ORDER — NALOXONE HYDROCHLORIDE 0.4 MG/ML
0.2 INJECTION, SOLUTION INTRAMUSCULAR; INTRAVENOUS; SUBCUTANEOUS
Status: DISCONTINUED | OUTPATIENT
Start: 2022-05-20 | End: 2022-05-21 | Stop reason: HOSPADM

## 2022-05-20 RX ORDER — ONDANSETRON 2 MG/ML
INJECTION INTRAMUSCULAR; INTRAVENOUS PRN
Status: DISCONTINUED | OUTPATIENT
Start: 2022-05-20 | End: 2022-05-20

## 2022-05-20 RX ORDER — ACETAMINOPHEN 325 MG/1
650 TABLET ORAL EVERY 4 HOURS PRN
Qty: 100 TABLET | Refills: 0 | Status: ON HOLD | OUTPATIENT
Start: 2022-05-20 | End: 2022-11-01

## 2022-05-20 RX ORDER — ACETAMINOPHEN 325 MG/1
975 TABLET ORAL EVERY 8 HOURS
Status: DISCONTINUED | OUTPATIENT
Start: 2022-05-20 | End: 2022-05-21 | Stop reason: HOSPADM

## 2022-05-20 RX ORDER — HYDROMORPHONE HCL IN WATER/PF 6 MG/30 ML
0.2 PATIENT CONTROLLED ANALGESIA SYRINGE INTRAVENOUS
Status: DISCONTINUED | OUTPATIENT
Start: 2022-05-20 | End: 2022-05-21 | Stop reason: HOSPADM

## 2022-05-20 RX ORDER — PROCHLORPERAZINE MALEATE 5 MG
5 TABLET ORAL EVERY 6 HOURS PRN
Status: DISCONTINUED | OUTPATIENT
Start: 2022-05-20 | End: 2022-05-21 | Stop reason: HOSPADM

## 2022-05-20 RX ORDER — SODIUM CHLORIDE, SODIUM LACTATE, POTASSIUM CHLORIDE, CALCIUM CHLORIDE 600; 310; 30; 20 MG/100ML; MG/100ML; MG/100ML; MG/100ML
INJECTION, SOLUTION INTRAVENOUS CONTINUOUS
Status: DISCONTINUED | OUTPATIENT
Start: 2022-05-20 | End: 2022-05-20 | Stop reason: HOSPADM

## 2022-05-20 RX ORDER — PROPOFOL 10 MG/ML
INJECTION, EMULSION INTRAVENOUS CONTINUOUS PRN
Status: DISCONTINUED | OUTPATIENT
Start: 2022-05-20 | End: 2022-05-20

## 2022-05-20 RX ORDER — ACETAMINOPHEN 325 MG/1
975 TABLET ORAL ONCE
Status: COMPLETED | OUTPATIENT
Start: 2022-05-20 | End: 2022-05-20

## 2022-05-20 RX ORDER — VANCOMYCIN HYDROCHLORIDE 1 G/20ML
INJECTION, POWDER, LYOPHILIZED, FOR SOLUTION INTRAVENOUS PRN
Status: DISCONTINUED | OUTPATIENT
Start: 2022-05-20 | End: 2022-05-20 | Stop reason: HOSPADM

## 2022-05-20 RX ORDER — HYDROMORPHONE HCL IN WATER/PF 6 MG/30 ML
0.4 PATIENT CONTROLLED ANALGESIA SYRINGE INTRAVENOUS
Status: DISCONTINUED | OUTPATIENT
Start: 2022-05-20 | End: 2022-05-21 | Stop reason: HOSPADM

## 2022-05-20 RX ORDER — OXYCODONE HYDROCHLORIDE 5 MG/1
10 TABLET ORAL EVERY 4 HOURS PRN
Status: DISCONTINUED | OUTPATIENT
Start: 2022-05-20 | End: 2022-05-21 | Stop reason: HOSPADM

## 2022-05-20 RX ORDER — ONDANSETRON 4 MG/1
4 TABLET, ORALLY DISINTEGRATING ORAL EVERY 30 MIN PRN
Status: DISCONTINUED | OUTPATIENT
Start: 2022-05-20 | End: 2022-05-20 | Stop reason: HOSPADM

## 2022-05-20 RX ORDER — EPHEDRINE SULFATE 50 MG/ML
INJECTION, SOLUTION INTRAMUSCULAR; INTRAVENOUS; SUBCUTANEOUS PRN
Status: DISCONTINUED | OUTPATIENT
Start: 2022-05-20 | End: 2022-05-20

## 2022-05-20 RX ORDER — TRANEXAMIC ACID 650 MG/1
1950 TABLET ORAL ONCE
Status: COMPLETED | OUTPATIENT
Start: 2022-05-20 | End: 2022-05-20

## 2022-05-20 RX ORDER — PROPOFOL 10 MG/ML
INJECTION, EMULSION INTRAVENOUS PRN
Status: DISCONTINUED | OUTPATIENT
Start: 2022-05-20 | End: 2022-05-20

## 2022-05-20 RX ORDER — OXYCODONE HYDROCHLORIDE 5 MG/1
5-10 TABLET ORAL EVERY 4 HOURS PRN
Qty: 30 TABLET | Refills: 0 | Status: SHIPPED | OUTPATIENT
Start: 2022-05-20 | End: 2022-05-21

## 2022-05-20 RX ORDER — FENTANYL CITRATE 0.05 MG/ML
25 INJECTION, SOLUTION INTRAMUSCULAR; INTRAVENOUS EVERY 5 MIN PRN
Status: DISCONTINUED | OUTPATIENT
Start: 2022-05-20 | End: 2022-05-20 | Stop reason: HOSPADM

## 2022-05-20 RX ORDER — DIPHENHYDRAMINE HCL 12.5MG/5ML
12.5 LIQUID (ML) ORAL EVERY 6 HOURS PRN
Status: DISCONTINUED | OUTPATIENT
Start: 2022-05-20 | End: 2022-05-21 | Stop reason: HOSPADM

## 2022-05-20 RX ORDER — ONDANSETRON 4 MG/1
4 TABLET, ORALLY DISINTEGRATING ORAL EVERY 6 HOURS PRN
Status: DISCONTINUED | OUTPATIENT
Start: 2022-05-20 | End: 2022-05-21 | Stop reason: HOSPADM

## 2022-05-20 RX ORDER — NALOXONE HYDROCHLORIDE 0.4 MG/ML
0.4 INJECTION, SOLUTION INTRAMUSCULAR; INTRAVENOUS; SUBCUTANEOUS
Status: DISCONTINUED | OUTPATIENT
Start: 2022-05-20 | End: 2022-05-21 | Stop reason: HOSPADM

## 2022-05-20 RX ORDER — AMOXICILLIN 250 MG
1-2 CAPSULE ORAL 2 TIMES DAILY
Qty: 30 TABLET | Refills: 0 | Status: ON HOLD | OUTPATIENT
Start: 2022-05-20 | End: 2022-10-15

## 2022-05-20 RX ORDER — CEFAZOLIN SODIUM/WATER 2 G/20 ML
2 SYRINGE (ML) INTRAVENOUS
Status: COMPLETED | OUTPATIENT
Start: 2022-05-20 | End: 2022-05-20

## 2022-05-20 RX ORDER — OXYCODONE HYDROCHLORIDE 5 MG/1
5 TABLET ORAL EVERY 4 HOURS PRN
Status: DISCONTINUED | OUTPATIENT
Start: 2022-05-20 | End: 2022-05-20 | Stop reason: HOSPADM

## 2022-05-20 RX ORDER — CEFAZOLIN SODIUM 2 G/100ML
2 INJECTION, SOLUTION INTRAVENOUS EVERY 8 HOURS
Status: COMPLETED | OUTPATIENT
Start: 2022-05-20 | End: 2022-05-21

## 2022-05-20 RX ORDER — CEFAZOLIN SODIUM/WATER 2 G/20 ML
2 SYRINGE (ML) INTRAVENOUS SEE ADMIN INSTRUCTIONS
Status: DISCONTINUED | OUTPATIENT
Start: 2022-05-20 | End: 2022-05-20 | Stop reason: HOSPADM

## 2022-05-20 RX ORDER — DEXAMETHASONE SODIUM PHOSPHATE 4 MG/ML
INJECTION, SOLUTION INTRA-ARTICULAR; INTRALESIONAL; INTRAMUSCULAR; INTRAVENOUS; SOFT TISSUE PRN
Status: DISCONTINUED | OUTPATIENT
Start: 2022-05-20 | End: 2022-05-20

## 2022-05-20 RX ORDER — ONDANSETRON 2 MG/ML
4 INJECTION INTRAMUSCULAR; INTRAVENOUS EVERY 30 MIN PRN
Status: DISCONTINUED | OUTPATIENT
Start: 2022-05-20 | End: 2022-05-20 | Stop reason: HOSPADM

## 2022-05-20 RX ORDER — ONDANSETRON 2 MG/ML
4 INJECTION INTRAMUSCULAR; INTRAVENOUS EVERY 6 HOURS PRN
Status: DISCONTINUED | OUTPATIENT
Start: 2022-05-20 | End: 2022-05-21 | Stop reason: HOSPADM

## 2022-05-20 RX ORDER — AMOXICILLIN 250 MG
1 CAPSULE ORAL 2 TIMES DAILY
Status: DISCONTINUED | OUTPATIENT
Start: 2022-05-20 | End: 2022-05-21 | Stop reason: HOSPADM

## 2022-05-20 RX ORDER — HYDROMORPHONE HCL IN WATER/PF 6 MG/30 ML
0.4 PATIENT CONTROLLED ANALGESIA SYRINGE INTRAVENOUS EVERY 5 MIN PRN
Status: DISCONTINUED | OUTPATIENT
Start: 2022-05-20 | End: 2022-05-20 | Stop reason: HOSPADM

## 2022-05-20 RX ORDER — BUPROPION HYDROCHLORIDE 150 MG/1
150 TABLET ORAL EVERY MORNING
Status: DISCONTINUED | OUTPATIENT
Start: 2022-05-21 | End: 2022-05-21 | Stop reason: HOSPADM

## 2022-05-20 RX ORDER — SODIUM CHLORIDE, SODIUM LACTATE, POTASSIUM CHLORIDE, CALCIUM CHLORIDE 600; 310; 30; 20 MG/100ML; MG/100ML; MG/100ML; MG/100ML
INJECTION, SOLUTION INTRAVENOUS CONTINUOUS
Status: DISCONTINUED | OUTPATIENT
Start: 2022-05-20 | End: 2022-05-21 | Stop reason: HOSPADM

## 2022-05-20 RX ORDER — FENTANYL CITRATE 50 UG/ML
INJECTION, SOLUTION INTRAMUSCULAR; INTRAVENOUS PRN
Status: DISCONTINUED | OUTPATIENT
Start: 2022-05-20 | End: 2022-05-20

## 2022-05-20 RX ORDER — AMLODIPINE BESYLATE 5 MG/1
5 TABLET ORAL DAILY
Status: DISCONTINUED | OUTPATIENT
Start: 2022-05-21 | End: 2022-05-21 | Stop reason: HOSPADM

## 2022-05-20 RX ORDER — OXYCODONE HYDROCHLORIDE 5 MG/1
5 TABLET ORAL EVERY 4 HOURS PRN
Status: DISCONTINUED | OUTPATIENT
Start: 2022-05-20 | End: 2022-05-21 | Stop reason: HOSPADM

## 2022-05-20 RX ORDER — GLYCOPYRROLATE 0.2 MG/ML
INJECTION, SOLUTION INTRAMUSCULAR; INTRAVENOUS PRN
Status: DISCONTINUED | OUTPATIENT
Start: 2022-05-20 | End: 2022-05-20

## 2022-05-20 RX ORDER — POLYETHYLENE GLYCOL 3350 17 G/17G
17 POWDER, FOR SOLUTION ORAL DAILY
Status: DISCONTINUED | OUTPATIENT
Start: 2022-05-21 | End: 2022-05-21 | Stop reason: HOSPADM

## 2022-05-20 RX ADMIN — TRANEXAMIC ACID 1950 MG: 650 TABLET ORAL at 07:59

## 2022-05-20 RX ADMIN — Medication 5 MG: at 10:01

## 2022-05-20 RX ADMIN — PROPOFOL 200 MG: 10 INJECTION, EMULSION INTRAVENOUS at 09:41

## 2022-05-20 RX ADMIN — FENTANYL CITRATE 50 MCG: 50 INJECTION, SOLUTION INTRAMUSCULAR; INTRAVENOUS at 12:02

## 2022-05-20 RX ADMIN — PROPOFOL 30 MCG/KG/MIN: 10 INJECTION, EMULSION INTRAVENOUS at 09:54

## 2022-05-20 RX ADMIN — FENTANYL CITRATE 25 MCG: 50 INJECTION, SOLUTION INTRAMUSCULAR; INTRAVENOUS at 12:13

## 2022-05-20 RX ADMIN — Medication 975 MG: at 16:08

## 2022-05-20 RX ADMIN — NEOSTIGMINE METHYLSULFATE 4.5 MG: 1 INJECTION, SOLUTION INTRAVENOUS at 11:37

## 2022-05-20 RX ADMIN — Medication 0.5 G: at 09:41

## 2022-05-20 RX ADMIN — ASPIRIN 81 MG: 81 TABLET, COATED ORAL at 21:18

## 2022-05-20 RX ADMIN — SODIUM CHLORIDE, POTASSIUM CHLORIDE, SODIUM LACTATE AND CALCIUM CHLORIDE: 600; 310; 30; 20 INJECTION, SOLUTION INTRAVENOUS at 14:34

## 2022-05-20 RX ADMIN — GLYCOPYRROLATE 0.7 MG: 0.2 INJECTION, SOLUTION INTRAMUSCULAR; INTRAVENOUS at 11:37

## 2022-05-20 RX ADMIN — SENNOSIDES AND DOCUSATE SODIUM 1 TABLET: 50; 8.6 TABLET ORAL at 21:18

## 2022-05-20 RX ADMIN — FENTANYL CITRATE 100 MCG: 50 INJECTION, SOLUTION INTRAMUSCULAR; INTRAVENOUS at 09:41

## 2022-05-20 RX ADMIN — FENTANYL CITRATE 25 MCG: 50 INJECTION, SOLUTION INTRAMUSCULAR; INTRAVENOUS at 12:10

## 2022-05-20 RX ADMIN — PHENYLEPHRINE HYDROCHLORIDE 100 MCG: 10 INJECTION INTRAVENOUS at 10:01

## 2022-05-20 RX ADMIN — OXYCODONE HYDROCHLORIDE 5 MG: 5 TABLET ORAL at 21:20

## 2022-05-20 RX ADMIN — ROCURONIUM BROMIDE 50 MG: 50 INJECTION, SOLUTION INTRAVENOUS at 09:41

## 2022-05-20 RX ADMIN — LIDOCAINE HYDROCHLORIDE 100 MG: 20 INJECTION, SOLUTION INFILTRATION; PERINEURAL at 09:41

## 2022-05-20 RX ADMIN — DEXAMETHASONE SODIUM PHOSPHATE 10 MG: 4 INJECTION, SOLUTION INTRA-ARTICULAR; INTRALESIONAL; INTRAMUSCULAR; INTRAVENOUS; SOFT TISSUE at 10:19

## 2022-05-20 RX ADMIN — SODIUM CHLORIDE, POTASSIUM CHLORIDE, SODIUM LACTATE AND CALCIUM CHLORIDE: 600; 310; 30; 20 INJECTION, SOLUTION INTRAVENOUS at 08:56

## 2022-05-20 RX ADMIN — ACETAMINOPHEN 975 MG: 325 TABLET, FILM COATED ORAL at 08:01

## 2022-05-20 RX ADMIN — ONDANSETRON 4 MG: 2 INJECTION INTRAMUSCULAR; INTRAVENOUS at 11:37

## 2022-05-20 RX ADMIN — Medication 0.5 G: at 09:39

## 2022-05-20 RX ADMIN — PHENYLEPHRINE HYDROCHLORIDE 100 MCG: 10 INJECTION INTRAVENOUS at 11:08

## 2022-05-20 RX ADMIN — Medication 0.5 G: at 09:38

## 2022-05-20 RX ADMIN — Medication 0.5 G: at 09:40

## 2022-05-20 RX ADMIN — CEFAZOLIN SODIUM 2 G: 2 INJECTION, SOLUTION INTRAVENOUS at 16:11

## 2022-05-20 RX ADMIN — Medication 5 MG: at 11:08

## 2022-05-20 RX ADMIN — MIDAZOLAM HYDROCHLORIDE 2 MG: 1 INJECTION, SOLUTION INTRAMUSCULAR; INTRAVENOUS at 09:11

## 2022-05-20 RX ADMIN — SODIUM CHLORIDE, POTASSIUM CHLORIDE, SODIUM LACTATE AND CALCIUM CHLORIDE: 600; 310; 30; 20 INJECTION, SOLUTION INTRAVENOUS at 11:47

## 2022-05-20 RX ADMIN — HYDROMORPHONE HYDROCHLORIDE 0.5 MG: 1 INJECTION, SOLUTION INTRAMUSCULAR; INTRAVENOUS; SUBCUTANEOUS at 10:12

## 2022-05-20 ASSESSMENT — LIFESTYLE VARIABLES: TOBACCO_USE: 1

## 2022-05-20 ASSESSMENT — ENCOUNTER SYMPTOMS: DYSRHYTHMIAS: 0

## 2022-05-20 NOTE — ANESTHESIA POSTPROCEDURE EVALUATION
Patient: Leonor Bernstein    Procedure: Procedure(s):  RIGHT TOTAL KNEE ARTHROPLASTY       Anesthesia Type:  General    Note:  Disposition: Inpatient   Postop Pain Control: Uneventful            Sign Out: Well controlled pain   PONV: No   Neuro/Psych: Uneventful            Sign Out: Acceptable/Baseline neuro status   Airway/Respiratory: Uneventful            Sign Out: Acceptable/Baseline resp. status   CV/Hemodynamics: Uneventful            Sign Out: Acceptable CV status   Other NRE: NONE   DID A NON-ROUTINE EVENT OCCUR? No           Last vitals:  Vitals Value Taken Time   /76 05/20/22 1340   Temp 37.1  C (98.7  F) 05/20/22 1340   Pulse 75 05/20/22 1342   Resp 23 05/20/22 1342   SpO2 95 % 05/20/22 1344   Vitals shown include unvalidated device data.    Electronically Signed By: Jose Wilcox MD  May 20, 2022  3:44 PM

## 2022-05-20 NOTE — ANESTHESIA PROCEDURE NOTES
Airway       Patient location: [facility] - Operating Room or Procedural Area.       Procedure Start/Stop Times: 5/20/2022 9:45 AM and 5/20/2022 9:45 AM  Staff -        CRNA: Sami Earl APRN CRNA       Performed By: CRNAIndications and Patient Condition       Indications for airway management: anupama-procedural and airway protection       Induction type:intravenous       Mask difficulty assessment: 2 - vent by mask + OA or adjuvant +/- NMBA    Final Airway Details       Final airway type: endotracheal airway       Successful airway: ETT - single  Endotracheal Airway Details        ETT size (mm): 7.0       Cuffed: yes       Successful intubation technique: video laryngoscopy       VL Blade Size: Glidescope 3       Grade View of Cords: 1       Adjucts: stylet       Position: Center       Measured from: gums/teeth       Bite block used: None    Post intubation assessment        Placement verified by: capnometry, equal breath sounds and chest rise        Number of attempts at approach: 2       Number of other approaches attempted: 0       Secured with: pink tape       Ease of procedure: easy       Dentition: Intact and Unchanged    Medication(s) Administered   Medication Administration Time: 5/20/2022 9:45 AM    Additional Comments         Routine anupama-procedural airway protection.     Attempt with Gtz #2 blade x1 by CRNA, then intubation with Glidescope #3 blade by CRNA.    7.0 mm ID endotracheal tube.

## 2022-05-20 NOTE — PROGRESS NOTES
Hospitalist consulted for med rec and comorbidity management in the setting of HTN.   Pt with hx HTN, depression, LAMBERTO, vit B and D deficiency  BP mildly hypertensive post procedure, mos recent 144/84.   Discussed with nursing, pt doing well at this time. No concerns.   Plan:  --resume PTA amlodipine with hold parameters   --resume PTA buproprion and fluoxetine  --resume supplements at discharge    Will defer formal consultation and chart check in the am. Please call with any questions or concerns.     Irina Montalvo PA-C

## 2022-05-20 NOTE — PROGRESS NOTES
[facility]      OUTPATIENT PHYSICAL THERAPY EVALUATION  PLAN OF TREATMENT FOR OUTPATIENT REHABILITATION  (COMPLETE FOR INITIAL CLAIMS ONLY)  Patient's Last Name, First Name, M.I.  YOB: 1950  Leonor Bernstein                        Provider's Name  [facility] Medical Record No.  [id number]                               Onset Date:  05/20/22   Start of Care Date:  05/20/22      Type:     _X_PT   ___OT   ___SLP Medical Diagnosis:  R TKA                        PT Diagnosis:  impaired functional mobility   Visits from SOC:  1   _________________________________________________________________________________  Plan of Treatment/Functional Goals    Planned Interventions: bed mobility training, gait training, home exercise program, ROM (range of motion), stair training, strengthening, transfer training     Goals: See Physical Therapy Goals on Care Plan in [employer] electronic health record.    Therapy Frequency: 2x/day  Predicted Duration of Therapy Intervention: 05/23/22  _________________________________________________________________________________    I CERTIFY THE NEED FOR THESE SERVICES FURNISHED UNDER        THIS PLAN OF TREATMENT AND WHILE UNDER MY CARE     (Physician co-signature of this document indicates review and certification of the therapy plan).                Certification date from: 05/20/22, Certification date to: 05/23/22    Referring Physician: Anseth, Scott Duane, MD            Initial Assessment        See Physical Therapy evaluation dated 05/20/22 in Epic electronic health record.

## 2022-05-20 NOTE — PROGRESS NOTES
05/20/22 1515   Quick Adds   Quick Adds Certification   Type of Visit Initial PT Evaluation   Living Environment   People in Home alone   Current Living Arrangements house   Home Accessibility stairs within home   Number of Stairs, Within Home, Primary greater than 10 stairs   Stair Railings, Within Home, Primary railings safe and in good condition   Transportation Anticipated family or friend will provide  (can't drive her regularly, she's doesn't think she can attend OP PT)   Living Environment Comments No REKHA, but does have 14 steps upstairs and flight down to basement.   Self-Care   Usual Activity Tolerance good   Current Activity Tolerance moderate   Equipment Currently Used at Home walker, rolling  (FWW)   Fall history within last six months yes   Number of times patient has fallen within last six months 3   Activity/Exercise/Self-Care Comment independent at baseline, uses FWW at night otherwise no device. Works full time in [employer]. Her brother and friends are helping the first few days after this surgery   General Information   Onset of Illness/Injury or Date of Surgery 05/20/22   Referring Physician Anseth, Scott Duane, MD   Patient/Family Therapy Goals Statement (PT) to go home   Pertinent History of Current Problem (include personal factors and/or comorbidities that impact the POC) R TKA   Existing Precautions/Restrictions fall   Weight-Bearing Status - LLE full weight-bearing   Weight-Bearing Status - RLE weight-bearing as tolerated   Cognition   Affect/Mental Status (Cognition) WFL   Pain Assessment   Patient Currently in Pain Yes, see Vital Sign flowsheet   Integumentary/Edema   Integumentary/Edema Comments R ace wrap intact   Range of Motion (ROM)   ROM Comment R knee 0-50 degrees   Strength (Manual Muscle Testing)   Strength (Manual Muscle Testing) Able to perform R SLR   Bed Mobility   Comment, (Bed Mobility) to/from chair   Transfers   Comment, (Transfers) SBA to FWW, steady, slow. Able to  demonstrate good hand placement safety   Gait/Stairs (Locomotion)   [place] Level (Gait) contact guard   Assistive Device (Gait) walker, front-wheeled   Distance in Feet (Required for LE Total Joints) 20ft   Comment, (Gait/Stairs) step to, antalgic gait pattern. Pt reports dizziness, declines to ambulate further   Balance   Balance Comments needs FWW for standing   Clinical Impression   Criteria for Skilled Therapeutic Intervention Yes, treatment indicated   PT Diagnosis (PT) impaired functional mobility   Influenced by the following impairments decreased ROM, strength, activity tolerance   Functional limitations due to impairments bed mob, transfers, ambulation, stairs   Clinical Presentation (PT Evaluation Complexity) Stable/Uncomplicated   Clinical Presentation Rationale clinical judgement   Clinical Decision Making (Complexity) low complexity   Planned Therapy Interventions (PT) bed mobility training;gait training;home exercise program;ROM (range of motion);stair training;strengthening;transfer training   Anticipated Equipment Needs at Discharge (PT) walker, rolling   Risk & Benefits of therapy have been explained evaluation/treatment results reviewed;care plan/treatment goals reviewed;risks/benefits reviewed;current/potential barriers reviewed;participants voiced agreement with care plan;participants included;patient   PT Discharge Planning   PT Discharge Recommendation (DC Rec)   (defer to ortho team)   PT Rationale for DC Rec Patient anticipated to be able to perform functional mobility w/ safe, mod I with FWW and family support as needed. Pt does report she's not sure she can attend OP PT and is requesting home PT   PT Brief overview of current status SBA   Therapy Certification   Start of care date 05/20/22   Certification date from 05/20/22   Certification date to 05/23/22   Medical Diagnosis R TKA   Total Evaluation Time   Total Evaluation Time (Minutes) 12   Physical Therapy Goals   PT Frequency  2x/day   PT Predicted Duration/Target Date for Goal Attainment 05/23/22   PT Goals Bed Mobility;Transfers;Gait;Stairs   PT: Bed Mobility Independent   PT: Transfers Modified independent;Sit to/from stand;Bed to/from chair;Assistive device   PT: Gait Modified independent;Rolling walker;150 feet   PT: Stairs Supervision/stand-by assist;Greater than 10 stairs;Rail on both sides
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
## 2022-05-20 NOTE — PROGRESS NOTES
Patient vital signs are at baseline: Yes  Patient able to ambulate as they were prior to admission or with assist devices provided by therapies during their stay:  Yes, up with PT this afternoon, already bending knee in bed  Patient MUST void prior to discharge:  No,  Reason:  DtV  Patient able to tolerate oral intake:  No, tolerating ice chips and water  Pain has adequate pain control using Oral analgesics:  Yes, block still intact  Does patient have an identified :  Yes  Has goal D/C date and time been discussed with patient:  Yes

## 2022-05-20 NOTE — PROCEDURES
OPERATIVE REPORT - TKA    DATE OF SERVICE:  5/20/2022    ATTENDING: LUCILA CASTILLO    SURGEON:  LUCILA CASTILLO    ASSISTANT:   Fabio Callahan PA-C    PREOPERATIVE DIAGNOSIS:  Right knee osteoarthritis    POSTOPERATIVE DIAGNOSIS:   Same     PROCEDURES PERFORMED:   Right Total Knee Arthroplasty.      ANESTHESIA:  1. General  2. Adductor canal block    ESTIMATED BLOOD LOSS:   25 mL    TRANSFUSIONS:  none    FLUIDS:   Per anesthesia    SPECIMENS:  Arthroplasty resection materials.    DRAIN:  None    COMPLICATIONS:  None    TOURNIQUET TIME:  60 minutes at 225 mmHg    INDICATIONS:   The patient is a very pleasant 72 year old female who has had persistent complaints of knee pain for some time now. The pain interferes with activities of daily living including sitting, standing, and ambulating short distances. The physical examination showed a painful and limited range of motion of the right knee.  The x-ray showed bone-on-bone arthritis of the right knee.     The patient has tried nonoperative measures to control their pain including Tylenol, oral anti-inflammatories, activity modification, low impact exercises, assistive devices, injections, none of which have provided satisfactory pain relief. The patient desires joint replacement of the knee to improve their lifestyle and reduce their pain.      Preoperatively, the risks, benefits, and possible complications of the procedure were discussed. The risks discussed included but were not limited to wear, loosening, infection, neurovascular damage, thromboembolic disease, foot drop, limb length inequality, persistent pain, stiffness and dislocation. All of the questions were satisfactorily answered and the patient wished to proceed with joint replacement surgery to improve their lifestyle and reduce their pain.       IMPLANTS:  1. Femoral component:  Pam Triathlon CR Right size 4  2. Tibial component: Roland Triathlon Hallett size 5   3. Poly insert:  Roland Triathlon X3  CS 9 mm  4. Patella: Pam Triathlon 31 x 9 mm symmetric  5. Bone cement:  Simplex    PROCEDURE  The patient was brought to the operating room after adequate induction of Anesthesia. The patient had a tourniquet placed on the thigh and the lower extremity were prepped and draped free in the usual sterile fashion using a Betadine scrub and ChloraPrep paint.    At this point a time-out procedure was carried out to identify the patient, the appropriate operative side, the implants, the code status, the fire risk, the preoperative medications and to confirm that the patient received 2 grams of ancef within one hour of the onset of the procedure.  Following completion of this, we proceeded with the case    The leg was elevated and exsanguinated, flexed to 90 degrees and the tourniquet was inflated to 225 mmHg. A midline incision was performed. This allowed creation of full thickness subcutaneous flaps.  A midvastus arthrotomy was used.  The meniscal tibial ligaments were released as feasible medially and laterally. The medial collateral ligament was subperiosteally elevated to the origin of the semimembranosus. The accessible anterior menisci were resected. The retropatellar fat pad was partially excised. The patella was dislocated into the lateral gutter and the knee was flexed to 90 degrees.     With the knee at 90 degrees flexion, the femoral intramedullary guide julee was placed and set for 5 degrees of valgus and impacted into position. The distal femoral resection was made.  The femur was sized to a 4, the 4-in-1 block was pinned in place with the appropriate amount of external rotation matching the transepicondylar axis and perpendicular to Wibaux's line.  The anterior posterior and chamfer osteotomies were completed. The residual osteophytes were removed from the periphery of the femur.     The tibia stabilized in an anterior subluxed position and neutral sagittal alignment. The intramedullary cutting  apparatus was placed down. We pinned the block in place, performed the proximal tibial resection using the oscillating saw and cutting block with 3 degrees of posterior inclination.  The tibial resection was checked with the intramedullary based checking apparatus and rasp. The tibia was sized to a 5. We put the tibial component in the appropriate amount of external rotation with the center between the middle and medial thirds of the tibial tuberosity. The proximal tibia was filled with morselized bone graft to permit cement extravasation.     The posterior osteophytes were resected using the curved half inch osteotome. The patella was everted. The patellar osteotomy, free hand, using the oscillating saw. Multiple quadrants were checked multiple times until the appropriate thickness was confirmed with the caliper.  The patella sized for a 31 mm patella. The patella was drilled through the patellar template.    The trial components were placed.  The leg came out to full extension and was nicely balanced with a 9 mm CS insert.  The knee was stable to varus and valgus stress in full extension. and mid flexion.  Our patellar tracking was checked.  The patella showed no tilt or subluxation and the patella engaging both condyles at 90 degrees. Satisfied with the patellar alignment, without any need for further releases, we removed the trial components. We prepared the bony surface for cementing.    The bony surfaces of the femur, tibia and patella were prepared for cementing.  Pulse irrigation was used on the femur, tibia and patella. The surfaces were then gauze dried. The femur, tibia and patella were cemented in place using vacuum mixed cement. Alignment, Range of Motion, Stability and Patellar tracking were appropriate.    The wound was closed in a layered fashion. The skin was brought together, everted and approximated with staples. Sterile dressings were applied. The patient was awakened and transported  postanesthesia care in stable condition at the end of the case. Sponge and needle counts were correct.     Hemostasis was obtained throughout the procedure and prior to the closure of each layer using electrocautery. Pulsatile irrigation and dilute betadine irrigation were used throughout the procedure. Surgical Body Exhaust Systems and high exchange air flow were used during the procedure. The patient received 2 grams of ancef prior to tourniquet inflation and within 1 hours of the start of the procedure for antibiotic prophylaxis.    POSTOPERATIVE PLAN  1. WBAT on the right lower extremity.   2. Antibiotic Prophylaxis were given 2 grams of ancef. Antibiotics were given within 1 hour of surgical incision and discontinued within 24 hours.  3. Pain control with Oxycodone and Tylenol  4. Follow up with Dr. Blum in 10-14 days. [phone].   5.  DVT prophylaxis aspirin and sequential compression devices will be started within 24 hours of surgery.  6. Plan discharge when meeting therapy goals and patient is medically stable  7. Caution with NSAID usage.    Rohit Blum MD  [facility]  [phone]  [phone]  Primary Care Physician Mini Hayden

## 2022-05-20 NOTE — ANESTHESIA CARE TRANSFER NOTE
Patient: Leonor Bernstein    Procedure: Procedure(s):  RIGHT TOTAL KNEE ARTHROPLASTY       Diagnosis: Osteoarthritis of right knee [M17.11]  Diagnosis Additional Information: No value filed.    Anesthesia Type:   General     Note:    Oropharynx: oropharynx clear of all foreign objects and spontaneously breathing  Level of Consciousness: awake  Oxygen Supplementation: face mask  Level of Supplemental Oxygen (L/min / FiO2): 8  Independent Airway: airway patency satisfactory and stable  Dentition: dentition unchanged  Vital Signs Stable: post-procedure vital signs reviewed and stable  Report to RN Given: handoff report given  Patient transferred to: PACU  Comments: Suctioned, spont resp ,lifts head  > 5 sec. Extubated with immediate exchange, to PACU, report to RN.  Handoff Report: Identifed the Patient, Identified the Reponsible Provider, Reviewed the pertinent medical history, Discussed the surgical course, Reviewed Intra-OP anesthesia mangement and issues during anesthesia, Set expectations for post-procedure period and Allowed opportunity for questions and acknowledgement of understanding      Vitals:  Vitals Value Taken Time   /77 05/20/22 1229   Temp     Pulse 84 05/20/22 1230   Resp 21 05/20/22 1230   SpO2 97 % 05/20/22 1230   Vitals shown include unvalidated device data.    Electronically Signed By: JACQUE Madrigal CRNA  May 20, 2022  12:31 PM

## 2022-05-21 ENCOUNTER — APPOINTMENT (OUTPATIENT)
Dept: PHYSICAL THERAPY | Facility: CLINIC | Age: 72
End: 2022-05-21
Payer: COMMERCIAL

## 2022-05-21 VITALS
SYSTOLIC BLOOD PRESSURE: 135 MMHG | BODY MASS INDEX: 32.32 KG/M2 | WEIGHT: 194 LBS | HEIGHT: 65 IN | RESPIRATION RATE: 16 BRPM | TEMPERATURE: 98.8 F | OXYGEN SATURATION: 95 % | HEART RATE: 71 BPM | DIASTOLIC BLOOD PRESSURE: 77 MMHG

## 2022-05-21 LAB
FASTING STATUS PATIENT QL REPORTED: ABNORMAL
GLUCOSE BLD-MCNC: 119 MG/DL (ref 70–99)
HGB BLD-MCNC: 11.4 G/DL (ref 11.7–15.7)

## 2022-05-21 PROCEDURE — 250N000013 HC RX MED GY IP 250 OP 250 PS 637: Performed by: PHYSICIAN ASSISTANT

## 2022-05-21 PROCEDURE — 99207 PR NO CHARGE LOS: CPT | Performed by: PHYSICIAN ASSISTANT

## 2022-05-21 PROCEDURE — 82947 ASSAY GLUCOSE BLOOD QUANT: CPT | Mod: GZ | Performed by: ORTHOPAEDIC SURGERY

## 2022-05-21 PROCEDURE — 250N000011 HC RX IP 250 OP 636

## 2022-05-21 PROCEDURE — 36415 COLL VENOUS BLD VENIPUNCTURE: CPT

## 2022-05-21 PROCEDURE — 85018 HEMOGLOBIN: CPT

## 2022-05-21 PROCEDURE — 97110 THERAPEUTIC EXERCISES: CPT | Mod: GP

## 2022-05-21 PROCEDURE — 97530 THERAPEUTIC ACTIVITIES: CPT | Mod: GP

## 2022-05-21 PROCEDURE — 250N000013 HC RX MED GY IP 250 OP 250 PS 637

## 2022-05-21 PROCEDURE — 97116 GAIT TRAINING THERAPY: CPT | Mod: GP

## 2022-05-21 RX ORDER — OXYCODONE HYDROCHLORIDE 5 MG/1
5-10 TABLET ORAL EVERY 4 HOURS PRN
Qty: 30 TABLET | Refills: 0 | Status: ON HOLD | OUTPATIENT
Start: 2022-05-21 | End: 2022-10-15

## 2022-05-21 RX ADMIN — CEFAZOLIN SODIUM 2 G: 2 INJECTION, SOLUTION INTRAVENOUS at 01:08

## 2022-05-21 RX ADMIN — OXYCODONE HYDROCHLORIDE 5 MG: 5 TABLET ORAL at 06:40

## 2022-05-21 RX ADMIN — Medication 975 MG: at 08:18

## 2022-05-21 RX ADMIN — OXYCODONE HYDROCHLORIDE 10 MG: 5 TABLET ORAL at 10:17

## 2022-05-21 RX ADMIN — Medication 975 MG: at 00:22

## 2022-05-21 RX ADMIN — FLUOXETINE 60 MG: 20 CAPSULE ORAL at 08:18

## 2022-05-21 RX ADMIN — AMLODIPINE BESYLATE 5 MG: 5 TABLET ORAL at 08:17

## 2022-05-21 RX ADMIN — ASPIRIN 81 MG: 81 TABLET, COATED ORAL at 08:17

## 2022-05-21 RX ADMIN — BUPROPION HYDROCHLORIDE 150 MG: 150 TABLET, FILM COATED, EXTENDED RELEASE ORAL at 08:17

## 2022-05-21 RX ADMIN — OXYCODONE HYDROCHLORIDE 10 MG: 5 TABLET ORAL at 02:01

## 2022-05-21 ASSESSMENT — ACTIVITIES OF DAILY LIVING (ADL): DEPENDENT_IADLS:: INDEPENDENT

## 2022-05-21 NOTE — PLAN OF CARE
Physical Therapy Discharge Summary    Reason for therapy discharge:    Discharged to home with home therapy.    Progress towards therapy goal(s). See goals on Care Plan in [facility] electronic health record for goal details.  Goals partially met.  Barriers to achieving goals:   discharge from facility.    Therapy recommendation(s):    Continued therapy is recommended.  Rationale/Recommendations:  Continued therapy recommended with HHPT to progress strength and functional mobility.

## 2022-05-21 NOTE — PROGRESS NOTES
Hospitalist chart check. BP well controlled. Discussed with nursing, pt doing well this morning. No concerns. Ok to discharge from medicine standpoint.   Please call with questions.     Irina Montalvo PA-C

## 2022-05-21 NOTE — PLAN OF CARE
OT: Orders received. Chart reviewed and discussed with care team.  After discussion with the patient, patient reported that she has no OT concerns at this time. Patient has had hip and back surgeries and owns all necessary AE, denies need for tutorial. Defer discharge recommendations to PT.  Will complete orders.

## 2022-05-21 NOTE — PROGRESS NOTES
Orthopedic Surgery-patient seen and examined by Dr. Kulwant Bernstein  2022  Admit Date:  2022  POD 1 Day Post-Op  S/P Procedure(s):  RIGHT TOTAL KNEE ARTHROPLASTY    Patient is feeling good.  Pain controlled.  Tolerating oral intake.  No events overnight. Discharge instructions reviewed.  She lives alone and needs homecare on discharge for physical therapy as she does not have the ability to have consistent rides.    Alert and orient to person, place, and time.  Vital Sign Ranges  Temperature Temp  Av  F (36.7  C)  Min: 97.3  F (36.3  C)  Max: 98.7  F (37.1  C)   Blood pressure Systolic (24hrs), Av , Min:128 , Max:166        Diastolic (24hrs), Av, Min:70, Max:86      Pulse Pulse  Av.6  Min: 0  Max: 85   Respirations Resp  Av.5  Min: 13  Max: 24   Pulse oximetry SpO2  Av.8 %  Min: 91 %  Max: 98 %       Right knee bulky post-op dressing is clean, dry, and intact. Minimal erythema of the surrounding skin.  Bilateral calves are soft, non-tender.  right lower extremity is NVI.    Labs:  Recent Labs   Lab Test 22  0743 20  0625 19  0738   POTASSIUM 3.9 3.7 3.8     Recent Labs   Lab Test 22  0743 20  0625 19  0746   HGB 14.2 10.6* 9.8*     Recent Labs   Lab Test 19  0654   INR 0.99     Recent Labs   Lab Test 20  0625 19  1628 19  0654    236 315       A/P  1. S/p right TKA   Continue aspirin 81bid for DVT prophylaxis.     Mobilize with PT/OT WBAT.     Continue current pain regimen.    2. Disposition   Anticipate d/c to home today with homecare.    Kjerstin L Foss, PA-C

## 2022-05-21 NOTE — PROGRESS NOTES
Patient vital signs are at baseline: Yes  Patient able to ambulate as they were prior to admission or with assist devices provided by therapies during their stay:  Yes  Patient MUST void prior to discharge:  Yes  Patient able to tolerate oral intake:  Yes  Pain has adequate pain control using Oral analgesics:  Yes  Does patient have an identified :  Yes  Has goal D/C date and time been discussed with patient:  Yes     Patient is A&Ox4. Void in bathroom with assist x 1 GB and walker. On Oxycodone and Tylenol for pain.

## 2022-05-21 NOTE — PLAN OF CARE
Goal Outcome Evaluation:    Plan of Care Reviewed With: patient     Overall Patient Progress: improving    Patient vital signs are at baseline: Yes  Patient able to ambulate as they were prior to admission or with assist devices provided by therapies during their stay:  Yes  Patient MUST void prior to discharge:  Yes  Patient able to tolerate oral intake:  Yes  Pain has adequate pain control using Oral analgesics:  Yes  Does patient have an identified :  Yes Judy(Sister-in-Law)  Has goal D/C date and time been discussed with patient:  Yes  Discharge 05/21/2022 to Home.    A&O x4.   CMS Intact except baseline Right foot/ankle numbness.   VSS on RA.   Up with Ax1 GB and walker.   Voiding in BR.   Pain controlled with Oxycodone, Tylenol.   Continue to monitor.

## 2022-05-21 NOTE — DISCHARGE SUMMARY
Patient vital signs are at baseline: Yes  Patient able to ambulate as they were prior to admission or with assist devices provided by therapies during their stay:  Yes  Patient MUST void prior to discharge:  Yes  Patient able to tolerate oral intake:  Yes  Pain has adequate pain control using Oral analgesics:  Yes  Does patient have an identified :  Yes  Has goal D/C date and time been discussed with patient:  Yes. discharge home.    Patient A&Ox4. VSS. CMS intact. Right knee dressing DCI. Pain manage with PRN oxy, schedule tylenol. Assist of 1/ SBA GB/W, voiding in the bathroom with adequate output. Discharging home with family, AVS instruction explained and provided to patient, belongings returned to Pt.

## 2022-05-21 NOTE — CONSULTS
Care Management Initial Consult    General Information  Assessment completed with: Patient, VM-chart review,    Type of CM/SW Visit: Initial Assessment    Primary Care Provider verified and updated as needed: Yes   Readmission within the last 30 days: no previous admission in last 30 days      Reason for Consult: discharge planning  Advance Care Planning: Advance Care Planning Reviewed: present on chart          Communication Assessment  Patient's communication style: spoken language (English or Bilingual)    Hearing Difficulty or Deaf: no   Wear Glasses or Blind: yes    Cognitive  Cognitive/Neuro/Behavioral: WDL                      Living Environment:   People in home: alone     Current living Arrangements: house      Able to return to prior arrangements: yes       Family/Social Support:  Care provided by: self  Provides care for: no one  Marital Status:   Sibling(s)          Description of Support System: Involved         Current Resources:   Patient receiving home care services: No     Community Resources: None  Equipment currently used at home: walker, standard, raised toilet seat, shower chair  Supplies currently used at home: None    Employment/Financial:  Employment Status: employed full-time        Financial Concerns: No concerns identified           Lifestyle & Psychosocial Needs:  Social Determinants of Health     Tobacco Use: Medium Risk     Smoking Tobacco Use: Former Smoker     Smokeless Tobacco Use: Never Used   Alcohol Use: Not on file   Financial Resource Strain: Not on file   Food Insecurity: Not on file   Transportation Needs: Not on file   Physical Activity: Not on file   Stress: Not on file   Social Connections: Not on file   Intimate Partner Violence: Not on file   Depression: Not on file   Housing Stability: Not on file       Functional Status:  Prior to admission patient needed assistance:   Dependent ADLs:: Ambulation-walker  Dependent IADLs:: Independent       Mental Health  Status:  Mental Health Status: No Current Concerns       Chemical Dependency Status:  Chemical Dependency Status: No Current Concerns             Values/Beliefs:  Spiritual, Cultural Beliefs, Advent Practices, Values that affect care: no               Care Management Discharge Note    Discharge Date: 05/21/2022       Discharge Disposition: Home    Discharge Services: Home PT    Discharge DME: None    Discharge Transportation: family or friend will provide    Private pay costs discussed: Not applicable    PAS Confirmation Code:    Patient/family educated on Medicare website which has current facility and service quality ratings: no    Education Provided on the Discharge Plan:    Persons Notified of Discharge Plans: patient, RN  Patient/Family in Agreement with the Plan: yes    Handoff Referral Completed: No    Additional Information:  Patient agreeable to home PT. No preference for agency. Writer called [facility] and SOC out 1 week. Writer then called [facility] and spoke to Karen in intake. [facility] can accept referral and can do SOC on Tuesday, May 24th. They will access orders via EPIC.       Nurys West RN   [facility]   Phone [phone]

## 2022-10-10 LAB
ALBUMIN SERPL-MCNC: 3.3 G/DL (ref 3.4–5)
ALP SERPL-CCNC: 190 U/L (ref 40–150)
ALT SERPL W P-5'-P-CCNC: 347 U/L (ref 0–50)
ANION GAP SERPL CALCULATED.3IONS-SCNC: 12 MMOL/L (ref 3–14)
AST SERPL W P-5'-P-CCNC: 834 U/L (ref 0–45)
ATRIAL RATE - MUSE: 76 BPM
BASOPHILS # BLD AUTO: 0 10E3/UL (ref 0–0.2)
BASOPHILS NFR BLD AUTO: 0 %
BILIRUB SERPL-MCNC: 1.6 MG/DL (ref 0.2–1.3)
BUN SERPL-MCNC: 32 MG/DL (ref 7–30)
CALCIUM SERPL-MCNC: 8.5 MG/DL (ref 8.5–10.1)
CHLORIDE BLD-SCNC: 106 MMOL/L (ref 94–109)
CO2 SERPL-SCNC: 22 MMOL/L (ref 20–32)
CREAT SERPL-MCNC: 1.3 MG/DL (ref 0.52–1.04)
DIASTOLIC BLOOD PRESSURE - MUSE: NORMAL MMHG
EOSINOPHIL # BLD AUTO: 0 10E3/UL (ref 0–0.7)
EOSINOPHIL NFR BLD AUTO: 0 %
ERYTHROCYTE [DISTWIDTH] IN BLOOD BY AUTOMATED COUNT: 13.7 % (ref 10–15)
GFR SERPL CREATININE-BSD FRML MDRD: 44 ML/MIN/1.73M2
GLUCOSE BLD-MCNC: 122 MG/DL (ref 70–99)
HCT VFR BLD AUTO: 45 % (ref 35–47)
HGB BLD-MCNC: 14.6 G/DL (ref 11.7–15.7)
IMM GRANULOCYTES # BLD: 0.1 10E3/UL
IMM GRANULOCYTES NFR BLD: 0 %
INTERPRETATION ECG - MUSE: NORMAL
LIPASE SERPL-CCNC: 134 U/L (ref 73–393)
LYMPHOCYTES # BLD AUTO: 0.2 10E3/UL (ref 0.8–5.3)
LYMPHOCYTES NFR BLD AUTO: 2 %
MCH RBC QN AUTO: 31.7 PG (ref 26.5–33)
MCHC RBC AUTO-ENTMCNC: 32.4 G/DL (ref 31.5–36.5)
MCV RBC AUTO: 98 FL (ref 78–100)
MONOCYTES # BLD AUTO: 0 10E3/UL (ref 0–1.3)
MONOCYTES NFR BLD AUTO: 0 %
NEUTROPHILS # BLD AUTO: 11 10E3/UL (ref 1.6–8.3)
NEUTROPHILS NFR BLD AUTO: 98 %
NRBC # BLD AUTO: 0 10E3/UL
NRBC BLD AUTO-RTO: 0 /100
P AXIS - MUSE: 19 DEGREES
PLATELET # BLD AUTO: 276 10E3/UL (ref 150–450)
POTASSIUM BLD-SCNC: 3.3 MMOL/L (ref 3.4–5.3)
PR INTERVAL - MUSE: 140 MS
PROT SERPL-MCNC: 7 G/DL (ref 6.8–8.8)
QRS DURATION - MUSE: 74 MS
QT - MUSE: 430 MS
QTC - MUSE: 483 MS
R AXIS - MUSE: -14 DEGREES
RBC # BLD AUTO: 4.6 10E6/UL (ref 3.8–5.2)
SODIUM SERPL-SCNC: 140 MMOL/L (ref 133–144)
SYSTOLIC BLOOD PRESSURE - MUSE: NORMAL MMHG
T AXIS - MUSE: 35 DEGREES
TROPONIN I SERPL HS-MCNC: 7 NG/L
VENTRICULAR RATE- MUSE: 76 BPM
WBC # BLD AUTO: 11.3 10E3/UL (ref 4–11)

## 2022-10-10 PROCEDURE — 83690 ASSAY OF LIPASE: CPT | Performed by: EMERGENCY MEDICINE

## 2022-10-10 PROCEDURE — 84484 ASSAY OF TROPONIN QUANT: CPT | Performed by: EMERGENCY MEDICINE

## 2022-10-10 PROCEDURE — 80053 COMPREHEN METABOLIC PANEL: CPT | Performed by: EMERGENCY MEDICINE

## 2022-10-10 PROCEDURE — 85025 COMPLETE CBC W/AUTO DIFF WBC: CPT | Performed by: EMERGENCY MEDICINE

## 2022-10-10 PROCEDURE — 99285 EMERGENCY DEPT VISIT HI MDM: CPT | Mod: 25

## 2022-10-10 PROCEDURE — 93005 ELECTROCARDIOGRAM TRACING: CPT

## 2022-10-10 PROCEDURE — 36415 COLL VENOUS BLD VENIPUNCTURE: CPT | Performed by: EMERGENCY MEDICINE

## 2022-10-10 NOTE — ED TRIAGE NOTES
"     Pt called EMS \"to be seen first\". Pt reports body aches, HA, chills, fever, abdominal pain radiating to back with nausea and diarrhea, sob, and cramps. Pt also reporting substernal chest pain earlier in the afternoon  "

## 2022-10-11 ENCOUNTER — APPOINTMENT (OUTPATIENT)
Dept: ULTRASOUND IMAGING | Facility: CLINIC | Age: 72
DRG: 854 | End: 2022-10-11
Attending: EMERGENCY MEDICINE
Payer: COMMERCIAL

## 2022-10-11 ENCOUNTER — ANESTHESIA (OUTPATIENT)
Dept: SURGERY | Facility: CLINIC | Age: 72
DRG: 854 | End: 2022-10-11
Payer: COMMERCIAL

## 2022-10-11 ENCOUNTER — HOSPITAL ENCOUNTER (INPATIENT)
Facility: CLINIC | Age: 72
LOS: 4 days | Discharge: HOME | DRG: 854 | End: 2022-10-15
Attending: EMERGENCY MEDICINE | Admitting: INTERNAL MEDICINE
Payer: COMMERCIAL

## 2022-10-11 ENCOUNTER — APPOINTMENT (OUTPATIENT)
Dept: CT IMAGING | Facility: CLINIC | Age: 72
DRG: 854 | End: 2022-10-11
Attending: EMERGENCY MEDICINE
Payer: COMMERCIAL

## 2022-10-11 ENCOUNTER — ANESTHESIA EVENT (OUTPATIENT)
Dept: SURGERY | Facility: CLINIC | Age: 72
DRG: 854 | End: 2022-10-11
Payer: COMMERCIAL

## 2022-10-11 ENCOUNTER — APPOINTMENT (OUTPATIENT)
Dept: GENERAL RADIOLOGY | Facility: CLINIC | Age: 72
DRG: 854 | End: 2022-10-11
Attending: STUDENT IN AN ORGANIZED HEALTH CARE EDUCATION/TRAINING PROGRAM
Payer: COMMERCIAL

## 2022-10-11 ENCOUNTER — APPOINTMENT (OUTPATIENT)
Dept: MRI IMAGING | Facility: CLINIC | Age: 72
DRG: 854 | End: 2022-10-11
Attending: PHYSICIAN ASSISTANT
Payer: COMMERCIAL

## 2022-10-11 DIAGNOSIS — N20.1 RIGHT URETERAL STONE: Primary | ICD-10-CM

## 2022-10-11 DIAGNOSIS — R79.89 ELEVATED LIVER FUNCTION TESTS: ICD-10-CM

## 2022-10-11 DIAGNOSIS — N17.9 ACUTE KIDNEY INJURY (H): ICD-10-CM

## 2022-10-11 DIAGNOSIS — N39.0 URINARY TRACT INFECTION IN FEMALE: ICD-10-CM

## 2022-10-11 LAB
ALBUMIN SERPL-MCNC: 2.6 G/DL (ref 3.4–5)
ALBUMIN UR-MCNC: NEGATIVE MG/DL
ALP SERPL-CCNC: 200 U/L (ref 40–150)
ALT SERPL W P-5'-P-CCNC: 441 U/L (ref 0–50)
ANION GAP SERPL CALCULATED.3IONS-SCNC: 10 MMOL/L (ref 3–14)
APPEARANCE UR: CLEAR
AST SERPL W P-5'-P-CCNC: 647 U/L (ref 0–45)
BILIRUB DIRECT SERPL-MCNC: 0.4 MG/DL (ref 0–0.2)
BILIRUB SERPL-MCNC: 1.5 MG/DL (ref 0.2–1.3)
BILIRUB UR QL STRIP: NEGATIVE
BUN SERPL-MCNC: 30 MG/DL (ref 7–30)
CALCIUM SERPL-MCNC: 7.1 MG/DL (ref 8.5–10.1)
CHLORIDE BLD-SCNC: 107 MMOL/L (ref 94–109)
CO2 SERPL-SCNC: 20 MMOL/L (ref 20–32)
COLOR UR AUTO: ABNORMAL
CREAT SERPL-MCNC: 1.39 MG/DL (ref 0.52–1.04)
ERYTHROCYTE [DISTWIDTH] IN BLOOD BY AUTOMATED COUNT: 14.1 % (ref 10–15)
GFR SERPL CREATININE-BSD FRML MDRD: 40 ML/MIN/1.73M2
GLUCOSE BLD-MCNC: 140 MG/DL (ref 70–99)
GLUCOSE UR STRIP-MCNC: NEGATIVE MG/DL
HCT VFR BLD AUTO: 38.2 % (ref 35–47)
HGB BLD-MCNC: 12.2 G/DL (ref 11.7–15.7)
HGB UR QL STRIP: ABNORMAL
KETONES UR STRIP-MCNC: NEGATIVE MG/DL
LEUKOCYTE ESTERASE UR QL STRIP: ABNORMAL
MCH RBC QN AUTO: 31.4 PG (ref 26.5–33)
MCHC RBC AUTO-ENTMCNC: 31.9 G/DL (ref 31.5–36.5)
MCV RBC AUTO: 98 FL (ref 78–100)
MUCOUS THREADS #/AREA URNS LPF: PRESENT /LPF
NITRATE UR QL: NEGATIVE
PH UR STRIP: 5 [PH] (ref 5–7)
PLATELET # BLD AUTO: 171 10E3/UL (ref 150–450)
POTASSIUM BLD-SCNC: 3.2 MMOL/L (ref 3.4–5.3)
POTASSIUM BLD-SCNC: 4.1 MMOL/L (ref 3.4–5.3)
PROT SERPL-MCNC: 5.6 G/DL (ref 6.8–8.8)
RBC # BLD AUTO: 3.89 10E6/UL (ref 3.8–5.2)
RBC URINE: 13 /HPF
SARS-COV-2 RNA RESP QL NAA+PROBE: NEGATIVE
SODIUM SERPL-SCNC: 137 MMOL/L (ref 133–144)
SP GR UR STRIP: 1.02 (ref 1–1.03)
SQUAMOUS EPITHELIAL: 1 /HPF
UROBILINOGEN UR STRIP-MCNC: NORMAL MG/DL
WBC # BLD AUTO: 11.8 10E3/UL (ref 4–11)
WBC URINE: 27 /HPF

## 2022-10-11 PROCEDURE — 258N000003 HC RX IP 258 OP 636: Performed by: INTERNAL MEDICINE

## 2022-10-11 PROCEDURE — 0T768DZ DILATION OF RIGHT URETER WITH INTRALUMINAL DEVICE, VIA NATURAL OR ARTIFICIAL OPENING ENDOSCOPIC: ICD-10-PCS | Performed by: STUDENT IN AN ORGANIZED HEALTH CARE EDUCATION/TRAINING PROGRAM

## 2022-10-11 PROCEDURE — 96375 TX/PRO/DX INJ NEW DRUG ADDON: CPT

## 2022-10-11 PROCEDURE — 74183 MRI ABD W/O CNTR FLWD CNTR: CPT

## 2022-10-11 PROCEDURE — 74177 CT ABD & PELVIS W/CONTRAST: CPT

## 2022-10-11 PROCEDURE — U0003 INFECTIOUS AGENT DETECTION BY NUCLEIC ACID (DNA OR RNA); SEVERE ACUTE RESPIRATORY SYNDROME CORONAVIRUS 2 (SARS-COV-2) (CORONAVIRUS DISEASE [COVID-19]), AMPLIFIED PROBE TECHNIQUE, MAKING USE OF HIGH THROUGHPUT TECHNOLOGIES AS DESCRIBED BY CMS-2020-01-R: HCPCS | Performed by: EMERGENCY MEDICINE

## 2022-10-11 PROCEDURE — 258N000003 HC RX IP 258 OP 636: Performed by: SURGERY

## 2022-10-11 PROCEDURE — 250N000011 HC RX IP 250 OP 636: Performed by: SURGERY

## 2022-10-11 PROCEDURE — 250N000011 HC RX IP 250 OP 636: Performed by: STUDENT IN AN ORGANIZED HEALTH CARE EDUCATION/TRAINING PROGRAM

## 2022-10-11 PROCEDURE — 370N000017 HC ANESTHESIA TECHNICAL FEE, PER MIN: Performed by: STUDENT IN AN ORGANIZED HEALTH CARE EDUCATION/TRAINING PROGRAM

## 2022-10-11 PROCEDURE — 82248 BILIRUBIN DIRECT: CPT | Performed by: INTERNAL MEDICINE

## 2022-10-11 PROCEDURE — 999N000179 XR SURGERY CARM FLUORO LESS THAN 5 MIN W STILLS: Mod: TC

## 2022-10-11 PROCEDURE — C2617 STENT, NON-COR, TEM W/O DEL: HCPCS | Performed by: STUDENT IN AN ORGANIZED HEALTH CARE EDUCATION/TRAINING PROGRAM

## 2022-10-11 PROCEDURE — 250N000013 HC RX MED GY IP 250 OP 250 PS 637: Performed by: STUDENT IN AN ORGANIZED HEALTH CARE EDUCATION/TRAINING PROGRAM

## 2022-10-11 PROCEDURE — C1769 GUIDE WIRE: HCPCS | Performed by: STUDENT IN AN ORGANIZED HEALTH CARE EDUCATION/TRAINING PROGRAM

## 2022-10-11 PROCEDURE — 99223 1ST HOSP IP/OBS HIGH 75: CPT | Mod: AI | Performed by: INTERNAL MEDICINE

## 2022-10-11 PROCEDURE — 93010 ELECTROCARDIOGRAM REPORT: CPT | Performed by: INTERNAL MEDICINE

## 2022-10-11 PROCEDURE — 250N000011 HC RX IP 250 OP 636: Performed by: EMERGENCY MEDICINE

## 2022-10-11 PROCEDURE — 120N000001 HC R&B MED SURG/OB

## 2022-10-11 PROCEDURE — 250N000011 HC RX IP 250 OP 636

## 2022-10-11 PROCEDURE — 255N000002 HC RX 255 OP 636: Performed by: STUDENT IN AN ORGANIZED HEALTH CARE EDUCATION/TRAINING PROGRAM

## 2022-10-11 PROCEDURE — 360N000075 HC SURGERY LEVEL 2, PER MIN: Performed by: STUDENT IN AN ORGANIZED HEALTH CARE EDUCATION/TRAINING PROGRAM

## 2022-10-11 PROCEDURE — 87086 URINE CULTURE/COLONY COUNT: CPT | Performed by: EMERGENCY MEDICINE

## 2022-10-11 PROCEDURE — 96361 HYDRATE IV INFUSION ADD-ON: CPT

## 2022-10-11 PROCEDURE — 250N000011 HC RX IP 250 OP 636: Performed by: INTERNAL MEDICINE

## 2022-10-11 PROCEDURE — 99221 1ST HOSP IP/OBS SF/LOW 40: CPT | Mod: 25 | Performed by: UROLOGY

## 2022-10-11 PROCEDURE — 999N000141 HC STATISTIC PRE-PROCEDURE NURSING ASSESSMENT: Performed by: STUDENT IN AN ORGANIZED HEALTH CARE EDUCATION/TRAINING PROGRAM

## 2022-10-11 PROCEDURE — 250N000009 HC RX 250

## 2022-10-11 PROCEDURE — 710N000009 HC RECOVERY PHASE 1, LEVEL 1, PER MIN: Performed by: STUDENT IN AN ORGANIZED HEALTH CARE EDUCATION/TRAINING PROGRAM

## 2022-10-11 PROCEDURE — 96365 THER/PROPH/DIAG IV INF INIT: CPT

## 2022-10-11 PROCEDURE — 74420 UROGRAPHY RTRGR +-KUB: CPT | Mod: 26 | Performed by: STUDENT IN AN ORGANIZED HEALTH CARE EDUCATION/TRAINING PROGRAM

## 2022-10-11 PROCEDURE — 36415 COLL VENOUS BLD VENIPUNCTURE: CPT | Performed by: STUDENT IN AN ORGANIZED HEALTH CARE EDUCATION/TRAINING PROGRAM

## 2022-10-11 PROCEDURE — 84132 ASSAY OF SERUM POTASSIUM: CPT | Performed by: STUDENT IN AN ORGANIZED HEALTH CARE EDUCATION/TRAINING PROGRAM

## 2022-10-11 PROCEDURE — 250N000009 HC RX 250: Performed by: EMERGENCY MEDICINE

## 2022-10-11 PROCEDURE — 258N000003 HC RX IP 258 OP 636

## 2022-10-11 PROCEDURE — 258N000003 HC RX IP 258 OP 636: Performed by: EMERGENCY MEDICINE

## 2022-10-11 PROCEDURE — 258N000003 HC RX IP 258 OP 636: Performed by: ANESTHESIOLOGY

## 2022-10-11 PROCEDURE — 93005 ELECTROCARDIOGRAM TRACING: CPT

## 2022-10-11 PROCEDURE — 250N000025 HC SEVOFLURANE, PER MIN: Performed by: STUDENT IN AN ORGANIZED HEALTH CARE EDUCATION/TRAINING PROGRAM

## 2022-10-11 PROCEDURE — 81001 URINALYSIS AUTO W/SCOPE: CPT | Performed by: EMERGENCY MEDICINE

## 2022-10-11 PROCEDURE — 258N000001 HC RX 258: Performed by: STUDENT IN AN ORGANIZED HEALTH CARE EDUCATION/TRAINING PROGRAM

## 2022-10-11 PROCEDURE — 255N000002 HC RX 255 OP 636: Performed by: EMERGENCY MEDICINE

## 2022-10-11 PROCEDURE — 96374 THER/PROPH/DIAG INJ IV PUSH: CPT | Mod: 59

## 2022-10-11 PROCEDURE — 96376 TX/PRO/DX INJ SAME DRUG ADON: CPT

## 2022-10-11 PROCEDURE — 272N000001 HC OR GENERAL SUPPLY STERILE: Performed by: STUDENT IN AN ORGANIZED HEALTH CARE EDUCATION/TRAINING PROGRAM

## 2022-10-11 PROCEDURE — 85027 COMPLETE CBC AUTOMATED: CPT | Performed by: INTERNAL MEDICINE

## 2022-10-11 PROCEDURE — 36415 COLL VENOUS BLD VENIPUNCTURE: CPT | Performed by: INTERNAL MEDICINE

## 2022-10-11 PROCEDURE — 52332 CYSTOSCOPY AND TREATMENT: CPT | Mod: RT | Performed by: STUDENT IN AN ORGANIZED HEALTH CARE EDUCATION/TRAINING PROGRAM

## 2022-10-11 PROCEDURE — C9803 HOPD COVID-19 SPEC COLLECT: HCPCS

## 2022-10-11 PROCEDURE — 250N000009 HC RX 250: Performed by: STUDENT IN AN ORGANIZED HEALTH CARE EDUCATION/TRAINING PROGRAM

## 2022-10-11 PROCEDURE — 76705 ECHO EXAM OF ABDOMEN: CPT

## 2022-10-11 PROCEDURE — 82310 ASSAY OF CALCIUM: CPT | Performed by: INTERNAL MEDICINE

## 2022-10-11 PROCEDURE — A9585 GADOBUTROL INJECTION: HCPCS | Performed by: EMERGENCY MEDICINE

## 2022-10-11 DEVICE — STENT URETERAL POLARIS ULTRA 6FRX26CM M0061921330
Type: IMPLANTABLE DEVICE | Site: BLADDER | Status: NON-FUNCTIONAL
Removed: 2022-11-01

## 2022-10-11 RX ORDER — CIPROFLOXACIN 2 MG/ML
400 INJECTION, SOLUTION INTRAVENOUS EVERY 12 HOURS
Status: DISCONTINUED | OUTPATIENT
Start: 2022-10-11 | End: 2022-10-12

## 2022-10-11 RX ORDER — PROCHLORPERAZINE 25 MG
12.5 SUPPOSITORY, RECTAL RECTAL EVERY 12 HOURS PRN
Status: DISCONTINUED | OUTPATIENT
Start: 2022-10-11 | End: 2022-10-15 | Stop reason: HOSPADM

## 2022-10-11 RX ORDER — HYDROMORPHONE HCL IN WATER/PF 6 MG/30 ML
0.4 PATIENT CONTROLLED ANALGESIA SYRINGE INTRAVENOUS EVERY 5 MIN PRN
Status: DISCONTINUED | OUTPATIENT
Start: 2022-10-11 | End: 2022-10-11 | Stop reason: HOSPADM

## 2022-10-11 RX ORDER — LIDOCAINE HYDROCHLORIDE 20 MG/ML
INJECTION, SOLUTION INFILTRATION; PERINEURAL PRN
Status: DISCONTINUED | OUTPATIENT
Start: 2022-10-11 | End: 2022-10-11

## 2022-10-11 RX ORDER — ONDANSETRON 2 MG/ML
4 INJECTION INTRAMUSCULAR; INTRAVENOUS EVERY 6 HOURS PRN
Status: DISCONTINUED | OUTPATIENT
Start: 2022-10-11 | End: 2022-10-15 | Stop reason: HOSPADM

## 2022-10-11 RX ORDER — MORPHINE SULFATE 2 MG/ML
2 INJECTION, SOLUTION INTRAMUSCULAR; INTRAVENOUS ONCE
Status: COMPLETED | OUTPATIENT
Start: 2022-10-11 | End: 2022-10-11

## 2022-10-11 RX ORDER — PROPOFOL 10 MG/ML
INJECTION, EMULSION INTRAVENOUS CONTINUOUS PRN
Status: DISCONTINUED | OUTPATIENT
Start: 2022-10-11 | End: 2022-10-11

## 2022-10-11 RX ORDER — SODIUM CHLORIDE, SODIUM LACTATE, POTASSIUM CHLORIDE, CALCIUM CHLORIDE 600; 310; 30; 20 MG/100ML; MG/100ML; MG/100ML; MG/100ML
INJECTION, SOLUTION INTRAVENOUS CONTINUOUS
Status: DISCONTINUED | OUTPATIENT
Start: 2022-10-11 | End: 2022-10-11 | Stop reason: HOSPADM

## 2022-10-11 RX ORDER — MAGNESIUM HYDROXIDE 1200 MG/15ML
LIQUID ORAL PRN
Status: DISCONTINUED | OUTPATIENT
Start: 2022-10-11 | End: 2022-10-11 | Stop reason: HOSPADM

## 2022-10-11 RX ORDER — LORAZEPAM 2 MG/ML
1 INJECTION INTRAMUSCULAR
Status: COMPLETED | OUTPATIENT
Start: 2022-10-11 | End: 2022-10-11

## 2022-10-11 RX ORDER — ONDANSETRON 4 MG/1
4 TABLET, ORALLY DISINTEGRATING ORAL EVERY 30 MIN PRN
Status: DISCONTINUED | OUTPATIENT
Start: 2022-10-11 | End: 2022-10-11 | Stop reason: HOSPADM

## 2022-10-11 RX ORDER — HYDROMORPHONE HCL IN WATER/PF 6 MG/30 ML
0.2 PATIENT CONTROLLED ANALGESIA SYRINGE INTRAVENOUS
Status: DISCONTINUED | OUTPATIENT
Start: 2022-10-11 | End: 2022-10-15 | Stop reason: HOSPADM

## 2022-10-11 RX ORDER — PROPOFOL 10 MG/ML
INJECTION, EMULSION INTRAVENOUS PRN
Status: DISCONTINUED | OUTPATIENT
Start: 2022-10-11 | End: 2022-10-11

## 2022-10-11 RX ORDER — ACETAMINOPHEN 650 MG/1
650 SUPPOSITORY RECTAL EVERY 6 HOURS PRN
Status: DISCONTINUED | OUTPATIENT
Start: 2022-10-11 | End: 2022-10-12

## 2022-10-11 RX ORDER — POTASSIUM CHLORIDE 1500 MG/1
40 TABLET, EXTENDED RELEASE ORAL ONCE
Status: COMPLETED | OUTPATIENT
Start: 2022-10-11 | End: 2022-10-11

## 2022-10-11 RX ORDER — CEFAZOLIN SODIUM/WATER 2 G/20 ML
2 SYRINGE (ML) INTRAVENOUS SEE ADMIN INSTRUCTIONS
Status: DISCONTINUED | OUTPATIENT
Start: 2022-10-11 | End: 2022-10-11

## 2022-10-11 RX ORDER — GADOBUTROL 604.72 MG/ML
9 INJECTION INTRAVENOUS ONCE
Status: COMPLETED | OUTPATIENT
Start: 2022-10-11 | End: 2022-10-11

## 2022-10-11 RX ORDER — FENTANYL CITRATE 50 UG/ML
INJECTION, SOLUTION INTRAMUSCULAR; INTRAVENOUS PRN
Status: DISCONTINUED | OUTPATIENT
Start: 2022-10-11 | End: 2022-10-11

## 2022-10-11 RX ORDER — SODIUM CHLORIDE 9 MG/ML
INJECTION, SOLUTION INTRAVENOUS CONTINUOUS
Status: DISCONTINUED | OUTPATIENT
Start: 2022-10-11 | End: 2022-10-12

## 2022-10-11 RX ORDER — ACETAMINOPHEN 325 MG/1
650 TABLET ORAL EVERY 6 HOURS PRN
Status: DISCONTINUED | OUTPATIENT
Start: 2022-10-11 | End: 2022-10-12

## 2022-10-11 RX ORDER — IOPAMIDOL 755 MG/ML
93 INJECTION, SOLUTION INTRAVASCULAR ONCE
Status: COMPLETED | OUTPATIENT
Start: 2022-10-11 | End: 2022-10-11

## 2022-10-11 RX ORDER — FENTANYL CITRATE 0.05 MG/ML
25 INJECTION, SOLUTION INTRAMUSCULAR; INTRAVENOUS EVERY 5 MIN PRN
Status: DISCONTINUED | OUTPATIENT
Start: 2022-10-11 | End: 2022-10-11 | Stop reason: HOSPADM

## 2022-10-11 RX ORDER — PROCHLORPERAZINE MALEATE 5 MG
5 TABLET ORAL EVERY 6 HOURS PRN
Status: DISCONTINUED | OUTPATIENT
Start: 2022-10-11 | End: 2022-10-15 | Stop reason: HOSPADM

## 2022-10-11 RX ORDER — CIPROFLOXACIN 2 MG/ML
400 INJECTION, SOLUTION INTRAVENOUS ONCE
Status: COMPLETED | OUTPATIENT
Start: 2022-10-11 | End: 2022-10-11

## 2022-10-11 RX ORDER — FENTANYL CITRATE 50 UG/ML
50 INJECTION, SOLUTION INTRAMUSCULAR; INTRAVENOUS ONCE
Status: COMPLETED | OUTPATIENT
Start: 2022-10-11 | End: 2022-10-11

## 2022-10-11 RX ORDER — FENTANYL CITRATE 50 UG/ML
25 INJECTION, SOLUTION INTRAMUSCULAR; INTRAVENOUS
Status: DISCONTINUED | OUTPATIENT
Start: 2022-10-11 | End: 2022-10-11 | Stop reason: HOSPADM

## 2022-10-11 RX ORDER — EPHEDRINE SULFATE 50 MG/ML
INJECTION, SOLUTION INTRAMUSCULAR; INTRAVENOUS; SUBCUTANEOUS PRN
Status: DISCONTINUED | OUTPATIENT
Start: 2022-10-11 | End: 2022-10-11

## 2022-10-11 RX ORDER — ONDANSETRON 4 MG/1
4 TABLET, ORALLY DISINTEGRATING ORAL EVERY 6 HOURS PRN
Status: DISCONTINUED | OUTPATIENT
Start: 2022-10-11 | End: 2022-10-15 | Stop reason: HOSPADM

## 2022-10-11 RX ORDER — HYDRALAZINE HYDROCHLORIDE 20 MG/ML
10 INJECTION INTRAMUSCULAR; INTRAVENOUS EVERY 4 HOURS PRN
Status: DISCONTINUED | OUTPATIENT
Start: 2022-10-11 | End: 2022-10-15 | Stop reason: HOSPADM

## 2022-10-11 RX ORDER — MORPHINE SULFATE 4 MG/ML
4 INJECTION, SOLUTION INTRAMUSCULAR; INTRAVENOUS ONCE
Status: COMPLETED | OUTPATIENT
Start: 2022-10-11 | End: 2022-10-11

## 2022-10-11 RX ORDER — DEXAMETHASONE SODIUM PHOSPHATE 4 MG/ML
INJECTION, SOLUTION INTRA-ARTICULAR; INTRALESIONAL; INTRAMUSCULAR; INTRAVENOUS; SOFT TISSUE PRN
Status: DISCONTINUED | OUTPATIENT
Start: 2022-10-11 | End: 2022-10-11

## 2022-10-11 RX ORDER — OXYCODONE HYDROCHLORIDE 5 MG/1
5 TABLET ORAL EVERY 4 HOURS PRN
Status: DISCONTINUED | OUTPATIENT
Start: 2022-10-11 | End: 2022-10-11 | Stop reason: HOSPADM

## 2022-10-11 RX ORDER — ONDANSETRON 2 MG/ML
4 INJECTION INTRAMUSCULAR; INTRAVENOUS ONCE
Status: COMPLETED | OUTPATIENT
Start: 2022-10-11 | End: 2022-10-11

## 2022-10-11 RX ORDER — LIDOCAINE 40 MG/G
CREAM TOPICAL
Status: DISCONTINUED | OUTPATIENT
Start: 2022-10-11 | End: 2022-10-15 | Stop reason: HOSPADM

## 2022-10-11 RX ORDER — ONDANSETRON 2 MG/ML
INJECTION INTRAMUSCULAR; INTRAVENOUS PRN
Status: DISCONTINUED | OUTPATIENT
Start: 2022-10-11 | End: 2022-10-11

## 2022-10-11 RX ORDER — ONDANSETRON 2 MG/ML
4 INJECTION INTRAMUSCULAR; INTRAVENOUS EVERY 30 MIN PRN
Status: DISCONTINUED | OUTPATIENT
Start: 2022-10-11 | End: 2022-10-11 | Stop reason: HOSPADM

## 2022-10-11 RX ORDER — IOPAMIDOL 612 MG/ML
INJECTION, SOLUTION INTRAVASCULAR PRN
Status: DISCONTINUED | OUTPATIENT
Start: 2022-10-11 | End: 2022-10-11 | Stop reason: HOSPADM

## 2022-10-11 RX ORDER — CEFAZOLIN SODIUM/WATER 2 G/20 ML
2 SYRINGE (ML) INTRAVENOUS
Status: DISCONTINUED | OUTPATIENT
Start: 2022-10-11 | End: 2022-10-11

## 2022-10-11 RX ADMIN — POTASSIUM CHLORIDE 40 MEQ: 1500 TABLET, EXTENDED RELEASE ORAL at 10:42

## 2022-10-11 RX ADMIN — ONDANSETRON 4 MG: 2 INJECTION INTRAMUSCULAR; INTRAVENOUS at 01:34

## 2022-10-11 RX ADMIN — LIDOCAINE HYDROCHLORIDE 100 MG: 20 INJECTION, SOLUTION INFILTRATION; PERINEURAL at 20:22

## 2022-10-11 RX ADMIN — PROPOFOL 180 MG: 10 INJECTION, EMULSION INTRAVENOUS at 20:22

## 2022-10-11 RX ADMIN — SODIUM CHLORIDE 1000 ML: 9 INJECTION, SOLUTION INTRAVENOUS at 04:50

## 2022-10-11 RX ADMIN — CIPROFLOXACIN 400 MG: 2 INJECTION, SOLUTION INTRAVENOUS at 04:50

## 2022-10-11 RX ADMIN — ONDANSETRON 4 MG: 2 INJECTION INTRAMUSCULAR; INTRAVENOUS at 02:53

## 2022-10-11 RX ADMIN — FENTANYL CITRATE 50 MCG: 50 INJECTION, SOLUTION INTRAMUSCULAR; INTRAVENOUS at 04:50

## 2022-10-11 RX ADMIN — Medication 5 MG: at 20:47

## 2022-10-11 RX ADMIN — PROPOFOL 30 MCG/KG/MIN: 10 INJECTION, EMULSION INTRAVENOUS at 20:22

## 2022-10-11 RX ADMIN — SODIUM CHLORIDE: 9 INJECTION, SOLUTION INTRAVENOUS at 22:15

## 2022-10-11 RX ADMIN — MORPHINE SULFATE 4 MG: 4 INJECTION, SOLUTION INTRAMUSCULAR; INTRAVENOUS at 01:31

## 2022-10-11 RX ADMIN — CIPROFLOXACIN 400 MG: 2 INJECTION, SOLUTION INTRAVENOUS at 16:47

## 2022-10-11 RX ADMIN — SODIUM CHLORIDE 1000 ML: 9 INJECTION, SOLUTION INTRAVENOUS at 01:26

## 2022-10-11 RX ADMIN — SODIUM CHLORIDE 67 ML: 900 INJECTION INTRAVENOUS at 02:09

## 2022-10-11 RX ADMIN — ONDANSETRON 4 MG: 2 INJECTION INTRAMUSCULAR; INTRAVENOUS at 04:58

## 2022-10-11 RX ADMIN — IOPAMIDOL 93 ML: 755 INJECTION, SOLUTION INTRAVENOUS at 02:08

## 2022-10-11 RX ADMIN — PHENYLEPHRINE HYDROCHLORIDE 200 MCG: 10 INJECTION INTRAVENOUS at 20:32

## 2022-10-11 RX ADMIN — ONDANSETRON 4 MG: 2 INJECTION INTRAMUSCULAR; INTRAVENOUS at 20:32

## 2022-10-11 RX ADMIN — FENTANYL CITRATE 25 MCG: 50 INJECTION, SOLUTION INTRAMUSCULAR; INTRAVENOUS at 20:16

## 2022-10-11 RX ADMIN — SODIUM CHLORIDE, POTASSIUM CHLORIDE, SODIUM LACTATE AND CALCIUM CHLORIDE: 600; 310; 30; 20 INJECTION, SOLUTION INTRAVENOUS at 16:41

## 2022-10-11 RX ADMIN — PHENYLEPHRINE HYDROCHLORIDE 200 MCG: 10 INJECTION INTRAVENOUS at 20:36

## 2022-10-11 RX ADMIN — GADOBUTROL 9 ML: 604.72 INJECTION INTRAVENOUS at 13:04

## 2022-10-11 RX ADMIN — SODIUM CHLORIDE, POTASSIUM CHLORIDE, SODIUM LACTATE AND CALCIUM CHLORIDE: 600; 310; 30; 20 INJECTION, SOLUTION INTRAVENOUS at 21:38

## 2022-10-11 RX ADMIN — DEXAMETHASONE SODIUM PHOSPHATE 4 MG: 4 INJECTION, SOLUTION INTRA-ARTICULAR; INTRALESIONAL; INTRAMUSCULAR; INTRAVENOUS; SOFT TISSUE at 20:27

## 2022-10-11 RX ADMIN — PHENYLEPHRINE HYDROCHLORIDE 200 MCG: 10 INJECTION INTRAVENOUS at 20:38

## 2022-10-11 RX ADMIN — Medication 10 MG: at 20:41

## 2022-10-11 RX ADMIN — ONDANSETRON 4 MG: 2 INJECTION INTRAMUSCULAR; INTRAVENOUS at 19:10

## 2022-10-11 RX ADMIN — FENTANYL CITRATE 25 MCG: 50 INJECTION, SOLUTION INTRAMUSCULAR; INTRAVENOUS at 20:26

## 2022-10-11 RX ADMIN — FENTANYL CITRATE 25 MCG: 50 INJECTION, SOLUTION INTRAMUSCULAR; INTRAVENOUS at 20:29

## 2022-10-11 RX ADMIN — LORAZEPAM 1 MG: 2 INJECTION INTRAMUSCULAR; INTRAVENOUS at 12:51

## 2022-10-11 RX ADMIN — Medication 10 MG: at 20:44

## 2022-10-11 RX ADMIN — MORPHINE SULFATE 2 MG: 2 INJECTION, SOLUTION INTRAMUSCULAR; INTRAVENOUS at 02:54

## 2022-10-11 RX ADMIN — SODIUM CHLORIDE: 9 INJECTION, SOLUTION INTRAVENOUS at 06:00

## 2022-10-11 RX ADMIN — FENTANYL CITRATE 25 MCG: 50 INJECTION, SOLUTION INTRAMUSCULAR; INTRAVENOUS at 20:34

## 2022-10-11 ASSESSMENT — ACTIVITIES OF DAILY LIVING (ADL)
ADLS_ACUITY_SCORE: 39
ADLS_ACUITY_SCORE: 35
ADLS_ACUITY_SCORE: 39
ADLS_ACUITY_SCORE: 37
NUMBER_OF_TIMES_PATIENT_HAS_FALLEN_WITHIN_LAST_SIX_MONTHS: 1
ADLS_ACUITY_SCORE: 26
ADLS_ACUITY_SCORE: 37
ADLS_ACUITY_SCORE: 39
FALL_HISTORY_WITHIN_LAST_SIX_MONTHS: YES
ADLS_ACUITY_SCORE: 39

## 2022-10-11 ASSESSMENT — ENCOUNTER SYMPTOMS
SORE THROAT: 0
DYSRHYTHMIAS: 0
VOMITING: 0
BLOOD IN STOOL: 0
DIARRHEA: 1
NAUSEA: 1
FEVER: 0
ABDOMINAL PAIN: 1
BACK PAIN: 1

## 2022-10-11 ASSESSMENT — LIFESTYLE VARIABLES: TOBACCO_USE: 1

## 2022-10-11 NOTE — H&P
[facility]    History and Physical - Hospitalist Service       Date of Admission:  10/11/2022  Dictation #: [id number]  Brief Summary (see dictation for more details): 72F hx CKD, GERD, Htn, s/p gastric bypass and cholecystectomy presents with right sided abd pain and found to have obstructing right sided kidney stones and possible choledocholithiasis.  Plan NPO, IVF, pain control, GI and urology consult.   Continue cipro for possible infected kidney stone given subjective fever/chills, urinary frequency and leukocytosis.      Clinically Significant Risk Factors Present on Admission             # Hypoalbuminemia: Albumin = 3.3 g/dL (Ref range: 3.4 - 5.0 g/dL) on admission, will monitor as appropriate                Fei Whitney,   Hospitalist Service  [facility]  Securely message with the Vocera Web Console (learn more here)  Text page via Bridge Paging/Directory

## 2022-10-11 NOTE — CONSULTS
"[facility]  Gastroenterology Consultation         Leonor Bernstein  [address]  [place] [address]  72 year old female    Admission Date/Time: 10/11/2022  Primary Care Provider: Mini Hayden  Referring / Attending Physician:  Dr. Fei Whitney    We were asked to see the patient in consultation by Dr. Fei Whitney for evaluation of questionable choledocholithiasis.      CC: right sided abdominal pain and chills    HPI:  Leonor Bernstein is a 72 year old female who has a past medical history of anemia, hypertension, GERD, CKD, EDDIE, obesity with history of gastric bypass in 1979 whom presented to ED at [facility] for abdominal pain and chills. Pain started a few hours post prandial at 230 pm yesterday with right sided abdominal discomfort and localized to RUQ and radiated to right back. She has had nausea and dry heaves with intermittent fever and chills.     Her workup revealed elevated LFTs, total bilirubin and ALP. Abdominal ultrasound showed right-sided kidney stone, which was followed up with CT, which showed 2 small obstructing ureteral stones on the right with hydronephrosis.  She reports a history of cholecystectomy in 1984 and developed similar pain a year ago with a \"gallbladder attack\". However this pain is not similar and has no pain with eating.     ROS: A comprehensive ten point review of systems was negative aside from those in mentioned in the HPI.      PAST MED HX:  I have reviewed this patient's medical history and updated it with pertinent information if needed.   Past Medical History:   Diagnosis Date     Allergic rhinitis      Anemia     iron def     Arthritis     hip     CKD (chronic kidney disease) stage 3, GFR 30-59 ml/min (H)      Depressive disorder      Gastroesophageal reflux disease      Hemorrhoids      Hypertension     not on BP     Obese      Osteoporosis      PONV (postoperative nausea and vomiting)      RLS (restless legs syndrome)      " "Sciatica of right side 2019    with wweakness, numbness and tingling     Sleep apnea     doesn't tolerate CPAP     Vitamin deficiency     B-complex and D       MEDICATIONS:   Prior to Admission Medications   Prescriptions Last Dose Informant Patient Reported? Taking?   FLUoxetine 20 MG tablet  Self Yes No   Sig: Take 60 mg by mouth daily (3 x 20 mg = 60 mg)   Ferrous Sulfate (IRON) 325 (65 Fe) MG tablet  Self Yes No   Sig: Take 1 tablet by mouth daily   Multiple Vitamins-Minerals (PRESERVISION AREDS PO)  Self Yes No   Sig: Take 2 capsules by mouth daily   acetaminophen (TYLENOL) 325 MG tablet   No No   Sig: Take 2 tablets (650 mg) by mouth every 4 hours as needed for other (mild pain)   amLODIPine (NORVASC) 5 MG tablet  Self Yes No   Sig: Take 5 mg by mouth daily   aspirin 81 MG EC tablet   No No   Sig: Take 1 tablet (81 mg) by mouth 2 times daily   buPROPion (WELLBUTRIN XL) 150 MG 24 hr tablet  Self Yes No   Sig: Take 150 mg by mouth every morning   cyanocobalamin (CYANOCOBALAMIN) 1000 MCG/ML injection  Self Yes No   Sig: Inject 1 mL into the muscle every 21 days    oxyCODONE (ROXICODONE) 5 MG tablet   No No   Sig: Take 1-2 tablets (5-10 mg) by mouth every 4 hours as needed for moderate to severe pain   polyethylene glycol (MIRALAX) 17 g packet   No No   Sig: Take 17 g by mouth daily   senna-docusate (SENOKOT-S/PERICOLACE) 8.6-50 MG tablet   No No   Sig: Take 1-2 tablets by mouth 2 times daily Take while on oral narcotics to prevent or treat constipation.      Facility-Administered Medications: None       ALLERGIES:   Allergies   Allergen Reactions     Ambien [Zolpidem] Other (See Comments)     Altered mental status     Penicillins Swelling     \"feet blew up\" as a 7 yr old  HAS TOLERATED ANCEF WITHOUT DIFFICULTY IN THE PAST       SOCIAL HISTORY:  Social History     Tobacco Use     Smoking status: Former     Packs/day: 0.50     Years: 4.00     Pack years: 2.00     Types: Cigarettes     Quit date: 12/15/1973     " Years since quittin.8     Smokeless tobacco: Never   Substance Use Topics     Alcohol use: Yes     Comment: 1-2 drinks a month     Drug use: No       FAMILY HISTORY:  No family history on file.    PHYSICAL EXAM:   General  Alert, oriented and c/o RUQ pain  Vital Signs with Ranges  Temp: 97.8  F (36.6  C) Temp src: Oral BP: 98/60 Pulse: 101   Resp: 16 SpO2: 93 % O2 Device: None (Room air) Oxygen Delivery: 2 LPM  No intake/output data recorded.    Constitutional: alert, moderate distress and cooperative   Cardiovascular: negative, PMI normal. No lifts, heaves, or thrills. RRR. No murmurs, clicks gallops or rub  Respiratory: negative, Percussion normal. Good diaphragmatic excursion. Lungs clear  Abdomen: Abdomen soft, tender. BS normal. No masses, organomegaly, positive findings: tenderness moderate RUQ          ADDITIONAL COMMENTS:   I reviewed the patient's new clinical lab test results.   Recent Labs   Lab Test 10/11/22  0609 10/10/22  1928 05/21/22  0715 22  0743 20  0625 19  1628 19  0654   WBC 11.8* 11.3*  --   --  6.2   < > 7.8   HGB 12.2 14.6 11.4*   < > 10.6*   < > 13.3   MCV 98 98  --   --  84   < > 91    276  --   --  346   < > 315   INR  --   --   --   --   --   --  0.99    < > = values in this interval not displayed.     Recent Labs   Lab Test 10/11/22  0609 10/10/22  1928 05/20/22  0743 20  0625   POTASSIUM 3.2* 3.3* 3.9 3.7   CHLORIDE 107 106  --  109   CO2   --     BUN 30 32*  --  27   ANIONGAP 10 12  --  6     Recent Labs   Lab Test 10/11/22  0609 10/11/22  0307 10/10/22  1928 01/29/20  0822 20  0625   ALBUMIN 2.6*  --  3.3*  --  3.3*   BILITOTAL 1.5*  --  1.6*  --  0.9   *  --  347*  --  18   *  --  834*  --  17   PROTEIN  --  Negative  --  Negative  --    LIPASE  --   --  134  --   --        I reviewed the patient's new imaging results.        CONSULTATION ASSESSMENT AND PLAN:    Leonor Bernstein is a 72 year old female with  abdominal pain and found to have obstructing right ureteral stones with hydronephrosis as well as elevated LFTs.    Elevated liver transaminases  AST/ALT/AlkPhos 647/441/200 and trending down Tbili 1.6->1.5. Lipase 134.  WBC elevated at 11.8  Abdominal ultrasound notes no biliary dilation  CT notes no biliary dilation. S/p cholecystectomy and findings of prior gastric bypass. Patient underwent gastric bypass in 1979 and would likely have had a jejunoileal bypass (would not have had Ciara- en- Y- first done in early 1990s) and does increase risk of kidney stones.  Patient may have LFT elevation due to obstruction of bile duct vs possibly from underlying infectious process.  Given history of gastric bypass would recommend MRCP to further evaluate for choledocholithiasis.    -- MRCP  -- NPO for urology and MRCP  -- Daily labs  -- NPO at midnight for possible ERCP tomorrow if anatomically able to perform ( given h/o gastric bypass      Right ureteral stone  Urinary tract infection in female  Appreciate urology consult and planning cystoscopy with right ureteral stent today        [facility].  Office: [phone]  Cell : [phone] (Dr. Sánchez)  Cell: [phone] (Kinga Unger PA-C)

## 2022-10-11 NOTE — ANESTHESIA PREPROCEDURE EVALUATION
"Anesthesia Pre-Procedure Evaluation    Patient: Leonor Bernstein   MRN: [id number] : 1950        Procedure : Procedure(s):  CYSTOSCOPY, WITH URETERAL STENT INSERTION.          Past Medical History:   Diagnosis Date     Allergic rhinitis      Anemia     iron def     Arthritis     hip     CKD (chronic kidney disease) stage 3, GFR 30-59 ml/min (H)      Depressive disorder      Gastroesophageal reflux disease      Hemorrhoids      Hypertension     not on BP     Obese      Osteoporosis      PONV (postoperative nausea and vomiting)      RLS (restless legs syndrome)      Sciatica of right side     with wweakness, numbness and tingling     Sleep apnea     doesn't tolerate CPAP     Vitamin deficiency     B-complex and D      Past Surgical History:   Procedure Laterality Date     ARTHROPLASTY KNEE Right 2022    Procedure: RIGHT TOTAL KNEE ARTHROPLASTY;  Surgeon: Anseth, Scott Duane, MD;  Location:  OR     CHOLECYSTECTOMY       COLONOSCOPY       DECOMPRESSION LUMBAR TWO LEVELS Bilateral 10/16/2017    Procedure: DECOMPRESSION LUMBAR TWO LEVELS;  BILATERAL L2-3 DECOMPRESSION ;  Surgeon: Nidhi Keating MD;  Location:  OR     GASTRIC BYPASS       GI SURGERY      gastric bypass     GYN SURGERY      ovarian cystectomy     GYN SURGERY  1986    tubal lig     LAMINECT/DISCECTOMY, LUMBAR  1970     LAMINECTOMY LUMBAR ONE LEVEL  2011    Procedure:LAMINECTOMY LUMBAR ONE LEVEL; BILATERAL L3-4 DECOMPRESSION ; Surgeon:NIDHI KEATING; Location: OR     OPTICAL TRACKING SYSTEM FUSION SPINE POSTERIOR LUMBAR THREE+ LEVELS N/A 2019    Procedure: REVISION laminectomy L 2 -Sacrum ; POSTERIOR spinal FUSION L2 -Sacrum using local autograft;  Surgeon: Raj Doan MD;  Location:  OR     ORTHOPEDIC SURGERY       right total hip      Allergies   Allergen Reactions     Ambien [Zolpidem] Other (See Comments)     Altered mental status     Penicillins Swelling     \"feet blew up\" as a 7 yr old  HAS " TOLERATED ANCEF WITHOUT DIFFICULTY IN THE PAST      Social History     Tobacco Use     Smoking status: Former     Packs/day: 0.50     Years: 4.00     Pack years: 2.00     Types: Cigarettes     Quit date: 12/15/1973     Years since quittin.8     Smokeless tobacco: Never   Substance Use Topics     Alcohol use: Yes     Comment: 1-2 drinks a month      Wt Readings from Last 1 Encounters:   10/10/22 83.9 kg (185 lb)        Anesthesia Evaluation            ROS/MED HX  ENT/Pulmonary:     (+) sleep apnea, uses CPAP, allergic rhinitis, tobacco use, Past use,     Neurologic: Comment: RLS  Spinal stenosis s/p multiple level L2-S1 fusion   (-) migraines   Cardiovascular:     (+) hypertension----- (-) CHF, PERLA and arrhythmias   METS/Exercise Tolerance: >4 METS    Hematologic:     (+) anemia,     Musculoskeletal: Comment: osteoporosis  (+) arthritis,     GI/Hepatic:     (+) GERD,     Renal/Genitourinary:     (+) renal disease, type: CRI, Nephrolithiasis ,     Endo:     (+) Obesity,     Psychiatric/Substance Use:     (+) psychiatric history depression     Infectious Disease:       Malignancy:       Other:            Physical Exam    Airway        Mallampati: I   TM distance: > 3 FB   Neck ROM: full   Mouth opening: > 3 cm    Respiratory Devices and Support         Dental  no notable dental history         Cardiovascular   cardiovascular exam normal          Pulmonary   pulmonary exam normal                OUTSIDE LABS:  CBC:   Lab Results   Component Value Date    WBC 11.8 (H) 10/11/2022    WBC 11.3 (H) 10/10/2022    HGB 12.2 10/11/2022    HGB 14.6 10/10/2022    HCT 38.2 10/11/2022    HCT 45.0 10/10/2022     10/11/2022     10/10/2022     BMP:   Lab Results   Component Value Date     10/11/2022     10/10/2022    POTASSIUM 3.2 (L) 10/11/2022    POTASSIUM 3.3 (L) 10/10/2022    CHLORIDE 107 10/11/2022    CHLORIDE 106 10/10/2022    CO2 20 10/11/2022    CO2 22 10/10/2022    BUN 30 10/11/2022    BUN 32 (H)  10/10/2022    CR 1.39 (H) 10/11/2022    CR 1.30 (H) 10/10/2022     (H) 10/11/2022     (H) 10/10/2022     COAGS:   Lab Results   Component Value Date    INR 0.99 06/03/2019     POC:   Lab Results   Component Value Date    BGM 88 08/08/2019     HEPATIC:   Lab Results   Component Value Date    ALBUMIN 2.6 (L) 10/11/2022    PROTTOTAL 5.6 (L) 10/11/2022     (H) 10/11/2022     (HH) 10/11/2022    ALKPHOS 200 (H) 10/11/2022    BILITOTAL 1.5 (H) 10/11/2022     OTHER:   Lab Results   Component Value Date    VICENTE 7.1 (L) 10/11/2022    MAG 2.2 08/08/2019    LIPASE 134 10/10/2022       Anesthesia Plan    ASA Status:  2   NPO Status:  NPO Appropriate    Anesthesia Type: General.     - Airway: LMA   Induction: Intravenous, Propofol.   Maintenance: Balanced.   Techniques and Equipment:       - Drips/Meds: Dexmed. infusion     Consents    Anesthesia Plan(s) and associated risks, benefits, and realistic alternatives discussed. Questions answered and patient/representative(s) expressed understanding.    - Discussed:     - Discussed with:  Patient         Postoperative Care    Pain management: Multi-modal analgesia, IV analgesics.   PONV prophylaxis: Ondansetron (or other 5HT-3), Background Propofol Infusion, Dexamethasone or Solumedrol     Comments:                Jose Wilcox MD

## 2022-10-11 NOTE — H&P
Admitted: 10/11/2022    PRIMARY CARE PHYSICIAN:  Dr. Mini Hayden.    CODE STATUS:  Full code.  Discussed with the patient.    CHIEF COMPLAINT:  Abdominal pain.    HISTORY OF PRESENT ILLNESS:  Ms. Bernstein is a 72-year-old female with a past medical history significant for chronic kidney disease, GERD, gastric bypass surgery, cholecystectomy, who presents to the Emergency Department with the above concerns.  History is obtained through discussion with the ED physician as well as the patient.  The patient states that she had lunch as per her normal today and did well for the first couple of hours, but around 2:30 had right-sided abdominal discomfort, which was more right upper quadrant, but did wrap around a bit into the back.  She felt some nausea as well as intermittently had some fevers and chills.  She also noted body aches and an episode of diarrhea while here in the Emergency Department.  She presented to the ED where she had elevations of transaminases, total bilirubin as well as alkaline phosphatase.  Abdominal ultrasound showed what was thought to be right-sided kidney stone, which was followed up with CT, which showed 2 small obstructing ureteral stones on the right with hydronephrosis.  The patient does have a history of cholecystectomy many years ago, back in 1984.  She also is status post gastric bypass surgery in 1979. Leading up to today, the patient has felt in normal state of health and has not had any similar symptoms either before or after eating over the past several days.  She states that she had one gallbladder attack about a month after her cholecystectomy, but nothing since that time.  She is not getting discomfort with eating typically.    In the Emergency Department, the patient had workup as above. Given the obstructing kidney stones and slightly abnormal UA as well as urinary frequency, she has been initiated on ciprofloxacin.  She has been given IV fluids as well as pain control and  antiemetics.    PAST MEDICAL HISTORY:     1.  GERD.  2.  Chronic kidney disease stage 2 with a baseline creatinine of roughly 1.  3.  Hypertension.  4.  Restless leg syndrome.  5.  EDDIE.  6.  Allergic rhinitis.  7.  Osteoporosis.  8.  Depression.  9.  Obesity.  10.  Vitamin B and D deficiencies.  11.  Iron deficiency anemia.  12.  Status post cholecystectomy in 1984.  13.  Status post gastric bypass surgery in 1979.  14.  Total hip arthroplasty on the right 2009.  15.  Total knee arthroplasty, right 2022.    MEDICATIONS:    Medications Prior to Admission   Medication Sig Dispense Refill Last Dose    acetaminophen (TYLENOL) 325 MG tablet Take 2 tablets (650 mg) by mouth every 4 hours as needed for other (mild pain) 100 tablet 0 Past Month    amLODIPine (NORVASC) 5 MG tablet Take 5 mg by mouth daily   10/10/2022    buPROPion (WELLBUTRIN XL) 150 MG 24 hr tablet Take 150 mg by mouth every morning   10/10/2022    cyanocobalamin (CYANOCOBALAMIN) 1000 MCG/ML injection Inject 1 mL (1,000 mcg) into the muscle every 14 days   10/10/2022    Ferrous Sulfate (IRON) 325 (65 Fe) MG tablet Take 1 tablet by mouth daily   10/10/2022    FLUoxetine 20 MG tablet Take 60 mg by mouth daily (3 x 20 mg = 60 mg)   10/10/2022    Multiple Vitamins-Minerals (PRESERVISION AREDS PO) Take 2 capsules by mouth daily   10/10/2022    vitamin D3 (CHOLECALCIFEROL) 1.25 MG (88848 UT) capsule Take 1 capsule (50,000 Units) by mouth once a week 11 capsule 0 10/10/2022    aspirin 81 MG EC tablet Take 1 tablet (81 mg) by mouth 2 times daily (Patient not taking: Reported on 10/11/2022) 60 tablet 0 Not Taking    oxyCODONE (ROXICODONE) 5 MG tablet Take 1-2 tablets (5-10 mg) by mouth every 4 hours as needed for moderate to severe pain (Patient not taking: Reported on 10/11/2022) 30 tablet 0 Not Taking    polyethylene glycol (MIRALAX) 17 g packet Take 17 g by mouth daily (Patient not taking: Reported on 10/11/2022) 7 packet 0 Not Taking    senna-docusate  (SENOKOT-S/PERICOLACE) 8.6-50 MG tablet Take 1-2 tablets by mouth 2 times daily Take while on oral narcotics to prevent or treat constipation. (Patient not taking: Reported on 10/11/2022) 30 tablet 0 Not Taking         SOCIAL HISTORY:  The patient denies any use of tobacco or alcohol and lives independently.    FAMILY HISTORY:  Father had heart disease.    ALLERGIES:  AMBIEN, PENICILLIN.    REVIEW OF SYSTEMS:  A complete review of systems reviewed and negative, save for the pertinent positives which are recorded in the HPI.    PHYSICAL EXAMINATION:    VITAL SIGNS:  Show blood pressure of 121/62, heart rate 96, respirations 16, satting 92% on room air, temperature 97.8 degrees Fahrenheit.  GENERAL:  The patient is lying in bed and appears mildly uncomfortable.  HEENT:  Pupils equal, round, reactive to light.  Extraocular muscle function intact.  No scleral icterus.  Oropharynx is clear.  NECK:  No lymphadenopathy or thyromegaly.  CARDIOVASCULAR:  Heart is regular rate and rhythm without any murmur, rub or gallop.  PULMONARY:  Lungs are clear to auscultation bilaterally without wheeze or rhonchi.  GASTROINTESTINAL:  Positive bowel sounds, soft, with tenderness in the right upper quadrant.  Minimal discomfort in the right lower quadrant.  SKIN:  No new rashes or lesions.  LYMPHATICS:  Nonpitting edema of the bilateral lower extremities.  PSYCHIATRIC:  Alert and oriented x 3, normal affect.  NEUROLOGIC:  Cranial nerves II through XII are grossly intact.  No focal deficits.    LABORATORY DATA:  WBC count 11.3, hemoglobin 14.6, platelets of 276.  Sodium 140, potassium 3.3, chloride 106, CO2 of 22, BUN 32, creatinine 1.33, ALT is 347, , total bilirubin 1.6, alkaline phosphatase 190.  UA shows moderate blood, leukocyte esterase as well as both WBCs and RBCs.    Abdominal ultrasound shows a right sided stones of and a small possible fluid area inferior to the kidney.   An abdominal CT shows 2 small obstructing stones  in the right proximal ureter with mild hydronephrosis and 3 nonobstructing stones in the right kidney, maximum 1.2 cm.    IMPRESSION AND PLAN:  Ms. Bernstein is a 72-year-old female with a past medical history significant for gastroesophageal reflux disease, chronic kidney disease, status post gastric bypass surgery as well as cholecystectomy, who presents to the Emergency Department with right upper quadrant discomfort and found to have both evidence of a possible obstructive process in the liver as well as 2 small right-sided obstructing kidney stones.  1.  Possible choledocholithiasis:  The patient does have an obstructive picture based upon her LFTs and significant right upper quadrant discomfort.  She is status post cholecystectomy back in the 1980s.  At this point, we will plan to make n.p.o. with pain control and antiemetics as needed.  We will plan to repeat LFTs in the morning to assess trend as well as Gastroenterology consultation for consideration of further workup.  2.  Right-sided obstructing kidney stones with hydronephrosis:  Largest of a 3 mm in the right ureter, but a 1.2 cm stone in the right kidney.  We will plan for IV fluids as well as pain control and Urology consultation in the morning given the obstructive nature with hydronephrosis.  3.  Acute kidney injury and chronic kidney disease stage 2: Creatinine is elevated to 1.3, likely due to the obstructive kidney stones.  Urology consult as discussed above and hydrate with normal saline.  4.  Mild hypokalemia:  Replacement per protocol.  5.  Hypertension:  We will await medication reconciliation, but appears to be on amlodipine.  Hydralazine will also be available as needed.  6.  Depression:  We will made await med reconciliation, but possibly on Wellbutrin and fluoxetine.  7.  Deep venous thrombosis prophylaxis:  Sequential compression devices and ambulation.  8.  Disposition:  Anticipate 2 nights in the hospital for further workup of kidney  stones and possible choledocholithiasis.    Fei Whitney DO        D: 10/11/2022   T: 10/11/2022   MT: [id number]    Name:     LUIS CHAUDHARY  MRN:      [id number]        Account:     [id number]   :      1950           Admitted:    10/11/2022       Document: [id number]    cc:  Mini Hayden MD

## 2022-10-11 NOTE — ED PROVIDER NOTES
History   Chief Complaint:  Abdominal Pain       The history is provided by the patient.      Leonor Bernstein is a 72 year old female with history of hypertension, CKD, and cholecystectomy who presents with abdominal pain. She reports abdominal pain that began at 14:30 this afternoon. She reports achy pain in the lower/upper right abdomen with radiation to the back and chest. This has been fairly constant and waxes and wanes regardless of what she does. Deep breaths has not exacerbated pain and she has not walked since pain began. The patient has not experienced this pain before. She notes that she had last eaten pasta around 12:30 and felt okay at that time. She states that her last bowel movement was diarrhea today and reports nausea. The patient denies vomiting, fever, sore throat, dysuria, urgency, hematuria, blood in her stool, and past bowel obstructions, diverticulitis, or kidney stones. Her cholecystectomy was 20-25 years ago, as well as gastric bypass in 1980.     Review of Systems   Constitutional: Negative for fever.   HENT: Negative for sore throat.    Cardiovascular: Positive for chest pain.   Gastrointestinal: Positive for abdominal pain, diarrhea and nausea. Negative for blood in stool and vomiting.   Musculoskeletal: Positive for back pain.   All other systems reviewed and are negative.      Allergies:  Ambien   Penicillins    Medications:  Amlodipine  Aspirin  Wellbutrin  Cyanocobalamin    Past Medical History:     Spinal stenosis  Degenerative scoliosis   Osteoarthritis    Depression  Chronic renal disease, stage III  Vitamin D deficiency  B-complex deficiencies  Iron deficiency anemia  Hypertension  Allergic rhinitis   Basal cell carcinoma  Hemorrhoids     Past Surgical History:    Arthroplasty knee  Cholecystectomy  Colonoscopy  Decompression lumbar two levels  Gastric bypass  Ovarian cystectomy  Tubal ligation  Laminectomy/discectomy  Right total hip   Myomectomy    Family History:    Heart  disease  Hypertension    Social History:  The patient presents to the ED with family   She arrived by ambulance   PCP: Mini Hayden     Physical Exam     Patient Vitals for the past 24 hrs:   BP Temp Temp src Pulse Resp SpO2 Weight   10/11/22 0522 -- -- -- -- -- 95 % --   10/11/22 0507 119/65 -- -- -- -- 94 % --   10/11/22 0350 -- -- -- -- -- 92 % --   10/11/22 0335 121/62 -- -- -- -- -- --   10/11/22 0230 (!) 162/92 -- -- 96 -- 90 % --   10/11/22 0200 (!) 141/69 -- -- 87 -- 90 % --   10/11/22 0130 (!) 146/77 -- -- 90 -- 95 % --   10/11/22 0125 (!) 162/90 -- -- 94 -- 94 % --   10/11/22 0120 (!) 150/83 -- -- -- -- -- --   10/10/22 1926 (!) 108/37 -- -- 78 16 98 % --   10/10/22 1923 -- 97.8  F (36.6  C) Oral -- -- -- 83.9 kg (185 lb)       Physical Exam    Physical Exam   Constitutional:   Moderate distress secondary to abdominal pain.   HENT:   Mouth/Throat:   Oropharynx is clear and dry.   Eyes:    Conjunctivae normal and EOM are normal. Pupils are equal, round, and reactive to light.   Neck:    Normal range of motion.   Cardiovascular: Normal rate, regular rhythm and normal heart sounds.  Exam reveals no gallop and no friction rub.  No murmur heard.  Pulmonary/Chest:  Effort normal and breath sounds normal. Patient has no wheezes. Patient has no rales.   Abdominal:   RUQ abdominal pain with guarding. No rebound. Soft. Bowel sounds are hypoactive. Patient exhibits no mass.    Musculoskeletal:  Normal range of motion. Patient exhibits no edema.   Neurological:   Patient is alert and oriented to person, place, and time. Patient is generally weak. No cranial nerve deficit or sensory deficit. GCS 15.  Skin:   Skin is warm and dry. No rash noted. No erythema.   Psychiatric:   Patient has a normal mood    Emergency Department Course   ECG  ECG results from 10/11/22   EKG 12 lead     Value    Systolic Blood Pressure     Diastolic Blood Pressure     Ventricular Rate 76    Atrial Rate 76    NE Interval 140    QRS  Duration 74        QTc 483    P Axis 19    R AXIS -14    T Axis 35    Interpretation ECG      Sinus rhythm with Premature supraventricular complexes  Nonspecific ST abnormality  Abnormal ECG  When compared with ECG of 03-JUN-2019 06:58,  Premature supraventricular complexes are now Present  Nonspecific T wave abnormality, improved in Inferior leads  T wave inversion no longer evident in Lateral leads  Confirmed by GENERATED REPORT, COMPUTER ([id number]),  Licha Tejeda ([id number]) on 10/10/2022 8:42:42 PM       Imaging:  CT Abdomen Pelvis w Contrast   Final Result   IMPRESSION:    1.  Two small obstructing stones measuring 3 mm and 2 mm in the proximal right ureter resulting in mild right hydronephrosis.      2.  There are 3 nonobstructing stones in the right kidney measuring up to 1.2 cm.      3.  Postoperative changes gastric bypass surgery. No acute bowel findings.      4.  Cholecystectomy.      US Abdomen Limited (RUQ)   Final Result   IMPRESSION:   1.  Nonobstructing right renal calculi with hydronephrosis. Further evaluation with CT is recommended.            Report per radiology    Laboratory:  Labs Ordered and Resulted from Time of ED Arrival to Time of ED Departure   COMPREHENSIVE METABOLIC PANEL - Abnormal       Result Value    Sodium 140      Potassium 3.3 (*)     Chloride 106      Carbon Dioxide (CO2) 22      Anion Gap 12      Urea Nitrogen 32 (*)     Creatinine 1.30 (*)     Calcium 8.5      Glucose 122 (*)     Alkaline Phosphatase 190 (*)      (*)      (*)     Protein Total 7.0      Albumin 3.3 (*)     Bilirubin Total 1.6 (*)     GFR Estimate 44 (*)    CBC WITH PLATELETS AND DIFFERENTIAL - Abnormal    WBC Count 11.3 (*)     RBC Count 4.60      Hemoglobin 14.6      Hematocrit 45.0      MCV 98      MCH 31.7      MCHC 32.4      RDW 13.7      Platelet Count 276      % Neutrophils 98      % Lymphocytes 2      % Monocytes 0      % Eosinophils 0      % Basophils 0      % Immature Granulocytes  0      NRBCs per 100 WBC 0      Absolute Neutrophils 11.0 (*)     Absolute Lymphocytes 0.2 (*)     Absolute Monocytes 0.0      Absolute Eosinophils 0.0      Absolute Basophils 0.0      Absolute Immature Granulocytes 0.1      Absolute NRBCs 0.0     ROUTINE UA WITH MICROSCOPIC REFLEX TO CULTURE - Abnormal    Color Urine Straw      Appearance Urine Clear      Glucose Urine Negative      Bilirubin Urine Negative      Ketones Urine Negative      Specific Gravity Urine 1.022      Blood Urine Moderate (*)     pH Urine 5.0      Protein Albumin Urine Negative      Urobilinogen Urine Normal      Nitrite Urine Negative      Leukocyte Esterase Urine Moderate (*)     Mucus Urine Present (*)     RBC Urine 13 (*)     WBC Urine 27 (*)     Squamous Epithelials Urine 1     LIPASE - Normal    Lipase 134     TROPONIN I - Normal    Troponin I High Sensitivity 7     COVID-19 VIRUS (CORONAVIRUS) BY PCR - Normal    SARS CoV2 PCR Negative     BASIC METABOLIC PANEL   HEPATIC FUNCTION PANEL   CBC WITH PLATELETS   URINE CULTURE        Emergency Department Course:     Reviewed:  I reviewed nursing notes, vitals, past medical history and Care Everywhere    Assessments:  0112 I obtained history and examined the patient as noted above. Discussed plan and likely admission.   0201 I rechecked the patient and explained findings. Her pain is improved.   0305 I rechecked and updated the patient. Discussed admission.     Consults:  0418 I consulted with Dr. Whitney, hospitalist, regarding the patient's history and presentation, who accepted the patient for admission.     Interventions:  0126 NS 1 L IV  0131 morphine 4 mg IV  0134 Zofran 4 mg IV  0253 Zofran 4 mg IV  0254 morphine 2 mg IV   ciprofloxacin 400 mg IV    Disposition:  The patient was admitted to the hospital under the care of Dr. Whitney.     Impression & Plan     Medical Decision Making:  Leonor Bernstein is a 72 year old female presenting with right upper quadrant abdominal pain in the  setting of cholecystectomy and blood work that was started in triage that showed an elevated liver function and bilirubin. I was concerned for choledocholithiasis. An ultrasound ordered while the patient was in triage due to prolonged wait times due to boarding. This did show abnormalities with the right kidney. I then did a CT which showed right ureteral stones with hydronephrosis. A urine analysis was obtained as well that is somewhat bland, but I am covering her with antibiotics.  She has mild elevation of her kidney function.  She is receiving IV fluids, pain and nausea medication.      At this point the patient has right ureteral stones. It is also certainly possible that she has choledocholithiasis simultaneously. I will admit to hospitalist as she will likely need ureteral stent and ERCP.     Diagnosis:    ICD-10-CM    1. Right ureteral stone  N20.1       2. Elevated liver function tests  R79.89       3. Urinary tract infection in female  N39.0       4. Acute kidney injury (H)  N17.9           Scribe Disclosure:  I, Kim Alatorrek, am serving as a scribe at 1:10 AM on 10/11/2022 to document services personally performed by Anju Duran MD based on my observations and the provider's statements to me.        Anju Duran MD  10/11/22 1949

## 2022-10-11 NOTE — PROVIDER NOTIFICATION
MD Notification    Notified Person: MD    Notified Person Name: Barbara    Notification Date/Time: 10/11/22 1227    Notification Interaction: [email or web address] messaging     Purpose of Notification: Claustrophobia noted on MRI checklist. Can she get a 1 time order for her scan? FFYI BP has been borderline low 90's.     Orders Received:    Comments:

## 2022-10-11 NOTE — PHARMACY-ADMISSION MEDICATION HISTORY
Pharmacy Medication History  Admission medication history interview status for the 10/11/2022  admission is complete. See EPIC admission navigator for prior to admission medications     Location of Interview: Patient room  Medication history sources: Patient    Significant changes made to the medication list:      In the past week, patient estimated taking medication this percent of the time: greater than 90%    Additional medication history information:       Medication reconciliation completed by provider prior to medication history? No    Time spent in this activity: 10 minutes     Prior to Admission medications    Medication Sig Last Dose Taking? Auth Provider Long Term End Date   acetaminophen (TYLENOL) 325 MG tablet Take 2 tablets (650 mg) by mouth every 4 hours as needed for other (mild pain) Past Month Yes Fabio Callahan PA-C     amLODIPine (NORVASC) 5 MG tablet Take 5 mg by mouth daily 10/10/2022 Yes Reported, Patient Yes    buPROPion (WELLBUTRIN XL) 150 MG 24 hr tablet Take 150 mg by mouth every morning 10/10/2022 Yes Reported, Patient Yes    cyanocobalamin (CYANOCOBALAMIN) 1000 MCG/ML injection Inject 1 mL (1,000 mcg) into the muscle every 14 days 10/10/2022 Yes Reported, Patient     Ferrous Sulfate (IRON) 325 (65 Fe) MG tablet Take 1 tablet by mouth daily 10/10/2022 Yes Reported, Patient     FLUoxetine 20 MG tablet Take 60 mg by mouth daily (3 x 20 mg = 60 mg) 10/10/2022 Yes Reported, Patient     Multiple Vitamins-Minerals (PRESERVISION AREDS PO) Take 2 capsules by mouth daily 10/10/2022 Yes Reported, Patient     vitamin D3 (CHOLECALCIFEROL) 1.25 MG (20680 UT) capsule Take 1 capsule (50,000 Units) by mouth once a week 10/10/2022 Yes Unknown, Entered By History  10/18/22   aspirin 81 MG EC tablet Take 1 tablet (81 mg) by mouth 2 times daily  Patient not taking: Reported on 10/11/2022 Not Taking  Fabio Callahan PA-C     oxyCODONE (ROXICODONE) 5 MG tablet Take 1-2 tablets (5-10 mg) by mouth every 4  hours as needed for moderate to severe pain  Patient not taking: Reported on 10/11/2022 Not Taking  Foss, Kjerstin L, PA-C     polyethylene glycol (MIRALAX) 17 g packet Take 17 g by mouth daily  Patient not taking: Reported on 10/11/2022 Not Taking  Fabio Callahan PA-C     senna-docusate (SENOKOT-S/PERICOLACE) 8.6-50 MG tablet Take 1-2 tablets by mouth 2 times daily Take while on oral narcotics to prevent or treat constipation.  Patient not taking: Reported on 10/11/2022 Not Taking  Fabio Callahan PA-C         The information provided in this note is only as accurate as the sources available at the time of update(s)
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
## 2022-10-12 ENCOUNTER — APPOINTMENT (OUTPATIENT)
Dept: GENERAL RADIOLOGY | Facility: CLINIC | Age: 72
DRG: 854 | End: 2022-10-12
Attending: HOSPITALIST
Payer: COMMERCIAL

## 2022-10-12 ENCOUNTER — PREP FOR PROCEDURE (OUTPATIENT)
Dept: UROLOGY | Facility: CLINIC | Age: 72
End: 2022-10-12

## 2022-10-12 DIAGNOSIS — N20.0 RIGHT KIDNEY STONE: ICD-10-CM

## 2022-10-12 DIAGNOSIS — N20.1 RIGHT URETERAL STONE: Primary | ICD-10-CM

## 2022-10-12 LAB
ALBUMIN SERPL-MCNC: 2.5 G/DL (ref 3.4–5)
ALP SERPL-CCNC: 175 U/L (ref 40–150)
ALT SERPL W P-5'-P-CCNC: 293 U/L (ref 0–50)
ANION GAP SERPL CALCULATED.3IONS-SCNC: 10 MMOL/L (ref 3–14)
AST SERPL W P-5'-P-CCNC: 162 U/L (ref 0–45)
BILIRUB SERPL-MCNC: 1.4 MG/DL (ref 0.2–1.3)
BUN SERPL-MCNC: 41 MG/DL (ref 7–30)
CALCIUM SERPL-MCNC: 7.3 MG/DL (ref 8.5–10.1)
CHLORIDE BLD-SCNC: 108 MMOL/L (ref 94–109)
CO2 SERPL-SCNC: 17 MMOL/L (ref 20–32)
CREAT SERPL-MCNC: 1.49 MG/DL (ref 0.52–1.04)
GFR SERPL CREATININE-BSD FRML MDRD: 37 ML/MIN/1.73M2
GLUCOSE BLD-MCNC: 121 MG/DL (ref 70–99)
POTASSIUM BLD-SCNC: 4.2 MMOL/L (ref 3.4–5.3)
PROT SERPL-MCNC: 5.9 G/DL (ref 6.8–8.8)
SODIUM SERPL-SCNC: 135 MMOL/L (ref 133–144)

## 2022-10-12 PROCEDURE — 99232 SBSQ HOSP IP/OBS MODERATE 35: CPT | Performed by: HOSPITALIST

## 2022-10-12 PROCEDURE — 250N000013 HC RX MED GY IP 250 OP 250 PS 637: Performed by: HOSPITALIST

## 2022-10-12 PROCEDURE — 120N000001 HC R&B MED SURG/OB

## 2022-10-12 PROCEDURE — 250N000011 HC RX IP 250 OP 636: Performed by: INTERNAL MEDICINE

## 2022-10-12 PROCEDURE — 80053 COMPREHEN METABOLIC PANEL: CPT | Performed by: STUDENT IN AN ORGANIZED HEALTH CARE EDUCATION/TRAINING PROGRAM

## 2022-10-12 PROCEDURE — 36415 COLL VENOUS BLD VENIPUNCTURE: CPT | Performed by: STUDENT IN AN ORGANIZED HEALTH CARE EDUCATION/TRAINING PROGRAM

## 2022-10-12 PROCEDURE — 71045 X-RAY EXAM CHEST 1 VIEW: CPT

## 2022-10-12 PROCEDURE — 82040 ASSAY OF SERUM ALBUMIN: CPT | Performed by: STUDENT IN AN ORGANIZED HEALTH CARE EDUCATION/TRAINING PROGRAM

## 2022-10-12 RX ORDER — AMLODIPINE BESYLATE 5 MG/1
5 TABLET ORAL DAILY
Status: DISCONTINUED | OUTPATIENT
Start: 2022-10-12 | End: 2022-10-15 | Stop reason: HOSPADM

## 2022-10-12 RX ORDER — NALOXONE HYDROCHLORIDE 0.4 MG/ML
0.2 INJECTION, SOLUTION INTRAMUSCULAR; INTRAVENOUS; SUBCUTANEOUS
Status: DISCONTINUED | OUTPATIENT
Start: 2022-10-12 | End: 2022-10-15 | Stop reason: HOSPADM

## 2022-10-12 RX ORDER — NALOXONE HYDROCHLORIDE 0.4 MG/ML
0.4 INJECTION, SOLUTION INTRAMUSCULAR; INTRAVENOUS; SUBCUTANEOUS
Status: DISCONTINUED | OUTPATIENT
Start: 2022-10-12 | End: 2022-10-15 | Stop reason: HOSPADM

## 2022-10-12 RX ORDER — CIPROFLOXACIN 500 MG/1
500 TABLET, FILM COATED ORAL EVERY 12 HOURS SCHEDULED
Status: DISCONTINUED | OUTPATIENT
Start: 2022-10-13 | End: 2022-10-13

## 2022-10-12 RX ORDER — ASPIRIN 81 MG/1
81 TABLET ORAL 2 TIMES DAILY
Status: DISCONTINUED | OUTPATIENT
Start: 2022-10-12 | End: 2022-10-12 | Stop reason: CLARIF

## 2022-10-12 RX ORDER — BUPROPION HYDROCHLORIDE 150 MG/1
150 TABLET ORAL EVERY MORNING
Status: DISCONTINUED | OUTPATIENT
Start: 2022-10-13 | End: 2022-10-12

## 2022-10-12 RX ORDER — BUPROPION HYDROCHLORIDE 150 MG/1
150 TABLET ORAL EVERY MORNING
Status: DISCONTINUED | OUTPATIENT
Start: 2022-10-12 | End: 2022-10-15 | Stop reason: HOSPADM

## 2022-10-12 RX ADMIN — BUPROPION HYDROCHLORIDE 150 MG: 150 TABLET, FILM COATED, EXTENDED RELEASE ORAL at 14:09

## 2022-10-12 RX ADMIN — FLUOXETINE HYDROCHLORIDE 60 MG: 20 CAPSULE ORAL at 14:34

## 2022-10-12 RX ADMIN — AMLODIPINE BESYLATE 5 MG: 5 TABLET ORAL at 14:09

## 2022-10-12 RX ADMIN — CIPROFLOXACIN 400 MG: 2 INJECTION, SOLUTION INTRAVENOUS at 04:46

## 2022-10-12 RX ADMIN — CIPROFLOXACIN 400 MG: 2 INJECTION, SOLUTION INTRAVENOUS at 16:28

## 2022-10-12 ASSESSMENT — ACTIVITIES OF DAILY LIVING (ADL)
ADLS_ACUITY_SCORE: 26

## 2022-10-12 NOTE — PROVIDER NOTIFICATION
"MD Notification    Notified Person: MD    Notified Person Name: Kassy    Notification Date/Time: 10/12/22    Notification Interaction: [employer] messaging     Purpose of Notification: PTA meds not ordered, pt requesting   Pt also reporting \"difficulty taking deep breaths,\" fine crackles to L base      Orders Received:  STAT chest xray, stop IVF    "

## 2022-10-12 NOTE — PLAN OF CARE
Goal Outcome Evaluation:           POD1 Right ureter stent placement. Pt initially NPO at start of shift, progressed to clear liquids and then low fiber at lunch, pt tolerating well, now on regular diet for dinner. Acute episode of difficulty taking deep breaths, encouraged IS, IVF discontinued and CXR obtained with no acute findings. Pt continues to have urgency but this is improving. Passing gas and having bowel movements. Up SBA. Denies pain or nausea, VSS on room air. Towards end of shift, pt expressed generalized not feeling well with headache and hot flashes, VSS.  Requested to MD to order PTA meds. Ambulated in robin x 1 SBA. Urology and GI following, likely discharge tomorrow with oral antibiotics with outpatient urology follow-up for stone removal.

## 2022-10-12 NOTE — PROVIDER NOTIFICATION
JAGUAR notified of patient complaining of chest tightness. Dr. Wilcox to bedside, patient on tele, appears to be A. Flutter. 12 lead EKG ordered.

## 2022-10-12 NOTE — ANESTHESIA PROCEDURE NOTES
Airway       Patient location during procedure: OR       Procedure Start/Stop Times: 10/11/2022 8:23 AM  Staff -        Anesthesiologist:  Jose Wilcox MD       CRNA: Harsha Sandoval APRN CRNA       Performed By: CRNA  Consent for Airway        Urgency: elective  Indications and Patient Condition       Indications for airway management: anupama-procedural       Induction type:intravenous       Mask difficulty assessment: 0 - not attempted    Final Airway Details       Final airway type: supraglottic airway    Supraglottic Airway Details        Type: LMA       Brand: Ambu AuraGain       LMA size: 4    Post intubation assessment        Placement verified by: capnometry, equal breath sounds and chest rise        Number of attempts at approach: 1       Secured with: plastic tape       Ease of procedure: easy       Dentition: Intact and Unchanged    Medication(s) Administered   Medication Administration Time: 10/11/2022 8:23 AM

## 2022-10-12 NOTE — PROGRESS NOTES
[facility]  Gastroenterology Progress Note     Leonor Bernstein MRN# [id number]   YOB: 1950 Age: 72 year old          Assessment and Plan:   Leonor Bernstein is a 72 year old female with abdominal pain and found to have obstructing right ureteral stones with hydronephrosis as well as elevated LFTs.     Elevated liver transaminases  LFTs greatly improved  Abdominal ultrasound notes no biliary dilation  CT notes no biliary dilation. S/p cholecystectomy and findings of prior gastric bypass. Patient underwent gastric bypass in 1979 and would likely have had a jejunoileal bypass (would not have had Ciara- en- Y- first done in early 1990s) and does increase risk of kidney stones.  MRCP negative for choledocholithiasis and no biliary dilation  Patient may have LFT elevation likely due to UTI process.  Given history of gastric bypass would recommend MRCP to further evaluate for choledocholithiasis.     -- No findings of biliary dilation or choledocholithiasis therefore no need for ERCP  -- Advance diet as tolerated per GI  -- GI will follow along peripherally      Right ureteral stone  Urinary tract infection in female  Appreciate urology consult                Interval History:   no new complaints and doing well; no cp, sob, n/v/d, or abd pain.              Review of Systems:   C: NEGATIVE for fever, chills, change in weight  E/M: NEGATIVE for ear, mouth and throat problems  R: NEGATIVE for significant cough or SOB  CV: NEGATIVE for chest pain, palpitations or peripheral edema             Medications:   I have reviewed this patient's current medications    ciprofloxacin  400 mg Intravenous Q12H     sodium chloride (PF)  3 mL Intracatheter Q8H                  Physical Exam:   Vitals were reviewed  Vital Signs with Ranges  Temp:  [97.2  F (36.2  C)-98.7  F (37.1  C)] 97.7  F (36.5  C)  Pulse:  [83-96] 86  Resp:  [15-21] 16  BP: ()/(53-75) 127/75  SpO2:  [91 %-98 %] 94 %  I/O  last 3 completed shifts:  In: 1000 [I.V.:1000]  Out: 300 [Urine:300]  Constitutional: healthy, alert and no distress   Cardiovascular: negative, PMI normal. No lifts, heaves, or thrills. RRR. No murmurs, clicks gallops or rub  Respiratory: negative, Percussion normal. Good diaphragmatic excursion. Lungs clear  Abdomen: Abdomen soft, non-tender. BS normal. No masses, organomegaly             Data:   I reviewed the patient's new clinical lab test results.   Recent Labs   Lab Test 10/11/22  0609 10/10/22  1928 05/21/22  0715 05/20/22  0743 01/29/20  0625 08/06/19  1628 06/03/19  0654   WBC 11.8* 11.3*  --   --  6.2   < > 7.8   HGB 12.2 14.6 11.4*   < > 10.6*   < > 13.3   MCV 98 98  --   --  84   < > 91    276  --   --  346   < > 315   INR  --   --   --   --   --   --  0.99    < > = values in this interval not displayed.     Recent Labs   Lab Test 10/12/22  0749 10/11/22  1725 10/11/22  0609 10/10/22  1928   POTASSIUM 4.2 4.1 3.2* 3.3*   CHLORIDE 108  --  107 106   CO2 17*  --  20 22   BUN 41*  --  30 32*   ANIONGAP 10  --  10 12     Recent Labs   Lab Test 10/12/22  0749 10/11/22  0609 10/11/22  0307 10/10/22  1928 01/29/20  0822   ALBUMIN 2.5* 2.6*  --  3.3*  --    BILITOTAL 1.4* 1.5*  --  1.6*  --    * 441*  --  347*  --    * 647*  --  834*  --    PROTEIN  --   --  Negative  --  Negative   LIPASE  --   --   --  134  --        I reviewed the patient's new imaging results.    All laboratory data reviewed  All imaging studies reviewed by me.    Kinga Unger PA-C,  10/12/2022  [facility]  Office : [phone]  Cell: [phone] (Dr. Sánchez)  Cell: [phone] (Kinga Unger PA-C)

## 2022-10-12 NOTE — ANESTHESIA CARE TRANSFER NOTE
Patient: Leonor Bernstein    Procedure: Procedure(s):  CYSTOSCOPY, RIGHT RETROGRADE PYELOGRAM WITH RIGHT URETERAL STENT INSERTION.       Diagnosis: Right ureteral stone [N20.1]  Diagnosis Additional Information: No value filed.    Anesthesia Type:   General     Note:    Oropharynx: oropharynx clear of all foreign objects and spontaneously breathing  Level of Consciousness: awake  Oxygen Supplementation: nasal cannula  Level of Supplemental Oxygen (L/min / FiO2): 4  Independent Airway: airway patency satisfactory and stable  Dentition: dentition unchanged  Vital Signs Stable: post-procedure vital signs reviewed and stable  Report to RN Given: handoff report given  Patient transferred to: PACU    Handoff Report: Identifed the Patient, Identified the Reponsible Provider, Reviewed the pertinent medical history, Discussed the surgical course, Reviewed Intra-OP anesthesia mangement and issues during anesthesia, Set expectations for post-procedure period and Allowed opportunity for questions and acknowledgement of understanding      Vitals:  Vitals Value Taken Time   /61    Temp 36.2    Pulse 89 10/11/22 2056   Resp 21 10/11/22 2056   SpO2 95 % 10/11/22 2056   Vitals shown include unvalidated device data.    Electronically Signed By: JACQUE Khalil CRNA  October 11, 2022  8:56 PM

## 2022-10-12 NOTE — PLAN OF CARE
Pt arrived from the OR around 2200, right ureteral stent placed. VSS, denies pain or nausea. C/o urinary urgency and frequency. Lower output, but not retaining per bladder scan. Up with SBA. BM overnight. NPO at midnight for possible ERCP today per GI notes.

## 2022-10-12 NOTE — PLAN OF CARE
4055-1063: Advanced to regular diet, tolerating. No c/o pain or SOB. IS encouraged. Ambulating independently to bathroom. Urinary frequency otherwise voiding without issues. IV leaking with 15 min left of cipro infusion. IV was removed and MD notified. Transitioned to oral cipro. OK to leave IV out. Plan to discharge home tomorrow.

## 2022-10-12 NOTE — PROGRESS NOTES
[facility]    Medicine Progress Note - Hospitalist Service    Date of Admission:  10/11/2022    Assessment & Plan        Ms. Bernstein is a 72-year-old female with a past medical history significant for gastroesophageal reflux disease, chronic kidney disease, status post gastric bypass surgery as well as cholecystectomy, who presents to the Emergency Department with right upper quadrant discomfort and found to have both evidence of a possible obstructive process in the liver as well as 2 small right-sided obstructing kidney stones.    Elevated liver transaminases  Possible choledocholithiasis  No abdominal pain. MRCP negative and LFT's are improving.    Continue to monitor     Right-sided obstructing kidney stones with hydronephrosis  Largest of a 3 mm in the right ureter, but a 1.2 cm stone in the right kidney.    Postoperative day # 1 cysto,right stent placement  Clinically doing well.     Continue course of ciprofloxacin      Acute kidney injury, obstructive, and chronic kidney disease stage 2  Creatinine   Date Value Ref Range Status   10/12/2022 1.49 (H) 0.52 - 1.04 mg/dL Final   01/29/2020 1.17 (H) 0.52 - 1.04 mg/dL Final   Creatinine is elevated to 1.3, likely due to the obstructive kidney stones.   Creatinine up slightly today.    Continue to monitor     Mild hypokalemia  Potassium   Date Value Ref Range Status   10/12/2022 4.2 3.4 - 5.3 mmol/L Final   01/29/2020 3.7 3.4 - 5.3 mmol/L Final   Corrected.    Hypertension    Resume prior to admission amlodipine     Depression    Resume prior to admission fluoxetine and bupropion      Diet: Advance Diet as Tolerated: Clear Liquid Diet    DVT Prophylaxis: Pneumatic Compression Devices  Johnson Catheter: Not present  Central Lines: None  Cardiac Monitoring: None  Code Status: Full Code      Disposition Plan      Expected Discharge Date: 10/13/2022                The patient's care was discussed with the Patient.    Kei Elena,  MD  Hospitalist Service  [facility]  Securely message with the [employer] Web Console (learn more here)  Text page via [employer] Paging/Directory     Clinically Significant Risk Factors Present on Admission                     ______________________________________________________________________    Interval History   Feeling better today but still weak.      Data reviewed today: I reviewed all medications, new labs and imaging results over the last 24 hours. I personally reviewed no images or EKG's today.    Physical Exam   Vital Signs: Temp: 97.7  F (36.5  C) Temp src: Oral BP: 127/75 Pulse: 86   Resp: 16 SpO2: 94 % O2 Device: None (Room air) Oxygen Delivery: 2 LPM  Weight: 185 lbs 0 oz  Constitutional: awake, alert, cooperative, no apparent distress  Respiratory: No increased work of breathing, good air exchange, clear to auscultation bilaterally, no crackles or wheezing  Cardiovascular: regular rate and rhythm, normal S1 and S2, no S3 or S4, and no murmur noted  GI: normal bowel sounds, soft, non-distended, non-tender, no masses palpated  Skin: no rashes and no jaundice  Neuropsychiatric: General: normal, calm and normal eye contact      Data   Recent Labs   Lab 10/12/22  0749 10/11/22  1725 10/11/22  0609 10/10/22  1928   WBC  --   --  11.8* 11.3*   HGB  --   --  12.2 14.6   MCV  --   --  98 98   PLT  --   --  171 276     --  137 140   POTASSIUM 4.2 4.1 3.2* 3.3*   CHLORIDE 108  --  107 106   CO2 17*  --  20 22   BUN 41*  --  30 32*   CR 1.49*  --  1.39* 1.30*   ANIONGAP 10  --  10 12   VICENTE 7.3*  --  7.1* 8.5   *  --  140* 122*   ALBUMIN 2.5*  --  2.6* 3.3*   PROTTOTAL 5.9*  --  5.6* 7.0   BILITOTAL 1.4*  --  1.5* 1.6*   ALKPHOS 175*  --  200* 190*   *  --  441* 347*   *  --  647* 834*   LIPASE  --   --   --  134     Recent Results (from the past 24 hour(s))   MR Abdomen MRCP w/o & w Contrast    Narrative    MAGNETIC RESONANCE CHOLANGIOPANCREATOGRAPHY October 11,  2022 2:15 PM     HISTORY: Elevated liver function tests in obstructive pattern.    COMPARISON: CT scan from October 11, 2022 at 1:57 AM.    TECHNIQUE: Multiplanar, multisequence images of the abdomen acquired  before and after administration of 9 mL Gadavist intravenous contrast.  MRCP images and coronal 3-D thin section T2-weighted MRCP images  through the biliary tree. 3D image reformatting was performed on the  acquisition scanner.    FINDINGS: The gallbladder is absent. There is no intra- or  extrahepatic biliary dilatation. There is no choledocholithiasis. No  biliary strictures. The pancreatic duct is not dilated. No pancreatic  duct strictures. No significant sidebranch visualization. No cystic  lesions within the pancreas. The signal intensity of the liver is  within normal limits without evidence for hepatic steatosis. The  signal intensity of the pancreas within normal limits without evidence  for pancreatitis. Visualized kidneys demonstrate unremarkable signal  intensity without hydronephrosis. Adrenal glands and spleen are  unremarkable. Visualized osseous structures unremarkable. After  administration of intravenous contrast, solid organs demonstrate no  enhancing focal lesions. Benign renal cyst on the right.      Impression    IMPRESSION:   1. No evidence for choledocholithiasis.   2. No biliary dilatation to suggest obstruction. No obstructing lesion  demonstrated.    ALYSSA GONZALEZ MD         SYSTEM ID:  [id number]   XR Surgery PRAMOD Fluoro Less Than 5 Min w Stills    Narrative    This exam was marked as non-reportable because it will not be read by a   radiologist or a [place] non-radiologist provider.           Medications     sodium chloride 100 mL/hr at 10/11/22 2215       ciprofloxacin  400 mg Intravenous Q12H     sodium chloride (PF)  3 mL Intracatheter Q8H

## 2022-10-12 NOTE — ANESTHESIA POSTPROCEDURE EVALUATION
Patient: Leonor Bernstein    Procedure: Procedure(s):  CYSTOSCOPY, RIGHT RETROGRADE PYELOGRAM WITH RIGHT URETERAL STENT INSERTION.       Anesthesia Type:  General    Note:  Disposition: Inpatient   Postop Pain Control: Uneventful            Sign Out: Well controlled pain   PONV: No   Neuro/Psych: Uneventful            Sign Out: Acceptable/Baseline neuro status   Airway/Respiratory: Uneventful            Sign Out: Acceptable/Baseline resp. status   CV/Hemodynamics: Uneventful            Sign Out: Acceptable CV status   Other NRE: NONE   DID A NON-ROUTINE EVENT OCCUR? No           Last vitals:  Vitals Value Taken Time   /67 10/11/22 2132   Temp 36.7  C (98  F) 10/11/22 2140   Pulse 86 10/11/22 2144   Resp 19 10/11/22 2144   SpO2 95 % 10/11/22 2144   Vitals shown include unvalidated device data.    Electronically Signed By: Jose Wilcox MD  October 11, 2022  11:39 PM

## 2022-10-12 NOTE — PROGRESS NOTES
[facility]rology Progress Note          Assessment and Plan:     Principal Problem:    Right ureteral stone    Assessment: POD 1 cysto,right stent placement     Plan: Will arrange definitive stone removal with Dr. Fuller in the coming weeks.  Could discharge from urologic standpoint on Cipro PO BID x 14 days. Clinically improved and would not have to wait for final UC in this scenario.   Will sign off. Please call if questions.      Krystal Díaz PA-C  [facility]  [phone]  Securely message with the Vocera Web Console   Monday-Wednesday and Friday                 Interval History:     doing well. No stent pain, dysuria has improved, afeb, WBC stable.              Medications:     Current Facility-Administered Medications Ordered in Epic   Medication Dose Route Frequency Last Rate Last Admin     acetaminophen (TYLENOL) tablet 650 mg  650 mg Oral Q6H PRN        Or     acetaminophen (TYLENOL) Suppository 650 mg  650 mg Rectal Q6H PRN         ciprofloxacin (CIPRO) infusion 400 mg  400 mg Intravenous Q12H 200 mL/hr at 10/12/22 0446 400 mg at 10/12/22 0446     hydrALAZINE (APRESOLINE) injection 10 mg  10 mg Intravenous Q4H PRN         HYDROmorphone (DILAUDID) injection 0.2 mg  0.2 mg Intravenous Q2H PRN         lidocaine (LMX4) cream   Topical Q1H PRN         lidocaine 1 % 0.1-1 mL  0.1-1 mL Other Q1H PRN         melatonin tablet 1 mg  1 mg Oral At Bedtime PRN         ondansetron (ZOFRAN ODT) ODT tab 4 mg  4 mg Oral Q6H PRN        Or     ondansetron (ZOFRAN) injection 4 mg  4 mg Intravenous Q6H PRN   4 mg at 10/11/22 0458     prochlorperazine (COMPAZINE) injection 5 mg  5 mg Intravenous Q6H PRN        Or     prochlorperazine (COMPAZINE) tablet 5 mg  5 mg Oral Q6H PRN        Or     prochlorperazine (COMPAZINE) suppository 12.5 mg  12.5 mg Rectal Q12H PRN         sodium chloride (PF) 0.9% PF flush 3 mL  3 mL Intracatheter Q8H         sodium chloride (PF) 0.9% PF flush 3 mL  3 mL Intracatheter q1  min prn         sodium chloride 0.9% infusion   Intravenous Continuous 100 mL/hr at 10/11/22 2215 New Bag at 10/11/22 2215     No current [facility]-ordered outpatient medications on file.                  Physical Exam:   Vitals were reviewed  Patient Vitals for the past 8 hrs:   BP Temp Temp src Pulse Resp SpO2   10/12/22 0000 128/68 98.7  F (37.1  C) Oral 92 16 91 %     GEN: NAD, lying in bed  EYES: EOMI             Data:   No results found for: NTBNPI, NTBNP  Lab Results   Component Value Date    WBC 11.8 (H) 10/11/2022    WBC 11.3 (H) 10/10/2022    WBC 6.2 01/29/2020    HGB 12.2 10/11/2022    HGB 14.6 10/10/2022    HGB 11.4 (L) 05/21/2022    HCT 38.2 10/11/2022    HCT 45.0 10/10/2022    HCT 34.3 (L) 01/29/2020    MCV 98 10/11/2022    MCV 98 10/10/2022    MCV 84 01/29/2020     10/11/2022     10/10/2022     01/29/2020     Lab Results   Component Value Date    INR 0.99 06/03/2019

## 2022-10-12 NOTE — OP NOTE
[facility]  Operative Note    Pre-operative diagnosis: Right ureteral stone [N20.1]   Post-operative diagnosis Right ureteral and kidney stone  Cystitis cystica   Procedure: Procedure(s):  CYSTOSCOPY, RIGHT RETROGRADE PYELOGRAM WITH RIGHT URETERAL STENT INSERTION.   FLUOROSCOPIC INTERPRETATION 1< HOUR PHYSICIAN TIME   Surgeon: Fei Fuller MD   Assistants(s): none   Anesthesia: General    Estimated blood loss: None    Total IV fluids: (See anesthesia record)   Blood transfusion: No transfusion was given during surgery   Total urine output: Not measured   Drains: Right ureteral stent   Specimens: * No specimens in log *     Implants: Implant Name Type Inv. Item Serial No.  Lot No. LRB No. Used Action   STENT URETERAL POLARIS ULTRA [id number] - [id number] Stent STENT URETERAL POLARIS ULTRA [id number]  [id number] Right 1 Implanted          Findings:   Mild to moderate right hydroureteronephrosis  Stent placed.   Complications: None.   Condition: Stable             Description of procedure:  71 yo F presenting with right flank pain and chills, found to have right proximal ureteral stones as well as right nonobstructive nephrolithiasis, who after discussion of the risks and benefits opts to undergo cystoscopy with right ureteral stent placement for decompression.  She understands that this does not treat the stone and she will need definitive stone surgery in the next few weeks after treatment of urinary tract infection.  Risks including stent irritation, hematuria were discussed.  Informed consent was obtained.    The patient was brought operating room #19.  She is already on scheduled ciprofloxacin antibiotics.  General anesthesia was induced, she was placed in dorsal lithotomy position, prepped and draped in standard sterile fashion.  A timeout was performed.    A 22 Welsh Storz cystoscope was assembled and passed through the urethra  into the bladder.  The bladder had diffuse cystitis cystica.  There was some squamous metaplasia of the trigone.  The right ureteral orifice was identified.  This was cannulated with a 5 Portuguese tiger tail catheter with the assistance of a 0.035 inch stiff shaft Glidewire.  The wire was removed and a  film showed some residual IV contrast within the right renal collecting system, indicative of ureteral obstruction.  A gentle retrograde pyelogram showed mild to moderate right hydroureteronephrosis.  The wire was then passed up to the renal pelvis and the tiger tail was removed.  A stent was then placed in the usual fashion under cystoscopic and fluoroscopic guidance with good coils noted proximally and distally.  Bladder was drained and scope was removed.  Patient was cleaned up, awoken from anesthesia and brought to PACU in stable condition    Fei Fuller MD   [facility]  [phone] clinic phone

## 2022-10-13 LAB
ALBUMIN SERPL-MCNC: 2.3 G/DL (ref 3.4–5)
ALBUMIN UR-MCNC: 50 MG/DL
ALP SERPL-CCNC: 161 U/L (ref 40–150)
ALT SERPL W P-5'-P-CCNC: 185 U/L (ref 0–50)
ANION GAP SERPL CALCULATED.3IONS-SCNC: 5 MMOL/L (ref 3–14)
APPEARANCE UR: ABNORMAL
AST SERPL W P-5'-P-CCNC: 62 U/L (ref 0–45)
BACTERIA #/AREA URNS HPF: ABNORMAL /HPF
BACTERIA UR CULT: ABNORMAL
BASOPHILS # BLD AUTO: 0.2 10E3/UL (ref 0–0.2)
BASOPHILS NFR BLD AUTO: 1 %
BILIRUB SERPL-MCNC: 1.3 MG/DL (ref 0.2–1.3)
BILIRUB UR QL STRIP: NEGATIVE
BUN SERPL-MCNC: 30 MG/DL (ref 7–30)
CALCIUM SERPL-MCNC: 7.6 MG/DL (ref 8.5–10.1)
CHLORIDE BLD-SCNC: 109 MMOL/L (ref 94–109)
CO2 SERPL-SCNC: 22 MMOL/L (ref 20–32)
COLOR UR AUTO: ABNORMAL
CREAT SERPL-MCNC: 1.11 MG/DL (ref 0.52–1.04)
EOSINOPHIL # BLD AUTO: 0.1 10E3/UL (ref 0–0.7)
EOSINOPHIL NFR BLD AUTO: 0 %
GFR SERPL CREATININE-BSD FRML MDRD: 53 ML/MIN/1.73M2
GLUCOSE BLD-MCNC: 101 MG/DL (ref 70–99)
GLUCOSE UR STRIP-MCNC: NEGATIVE MG/DL
HGB UR QL STRIP: ABNORMAL
IMM GRANULOCYTES # BLD: 0.2 10E3/UL
IMM GRANULOCYTES NFR BLD: 1 %
KETONES UR STRIP-MCNC: NEGATIVE MG/DL
LEUKOCYTE ESTERASE UR QL STRIP: ABNORMAL
LYMPHOCYTES # BLD AUTO: 0.7 10E3/UL (ref 0.8–5.3)
LYMPHOCYTES NFR BLD AUTO: 3 %
MONOCYTES # BLD AUTO: 1 10E3/UL (ref 0–1.3)
MONOCYTES NFR BLD AUTO: 4 %
MUCOUS THREADS #/AREA URNS LPF: PRESENT /LPF
NEUTROPHILS # BLD AUTO: 24.6 10E3/UL (ref 1.6–8.3)
NEUTROPHILS NFR BLD AUTO: 91 %
NITRATE UR QL: NEGATIVE
NRBC # BLD AUTO: 0 10E3/UL
NRBC BLD AUTO-RTO: 0 /100
PH UR STRIP: 5.5 [PH] (ref 5–7)
POTASSIUM BLD-SCNC: 4 MMOL/L (ref 3.4–5.3)
PROCALCITONIN SERPL-MCNC: 23.01 NG/ML
PROT SERPL-MCNC: 5.9 G/DL (ref 6.8–8.8)
RBC URINE: 125 /HPF
SODIUM SERPL-SCNC: 136 MMOL/L (ref 133–144)
SP GR UR STRIP: 1.02 (ref 1–1.03)
SQUAMOUS EPITHELIAL: 1 /HPF
UROBILINOGEN UR STRIP-MCNC: NORMAL MG/DL
WBC # BLD AUTO: 26.6 10E3/UL (ref 4–11)
WBC URINE: 52 /HPF

## 2022-10-13 PROCEDURE — 120N000001 HC R&B MED SURG/OB

## 2022-10-13 PROCEDURE — 250N000011 HC RX IP 250 OP 636: Performed by: HOSPITALIST

## 2022-10-13 PROCEDURE — 250N000013 HC RX MED GY IP 250 OP 250 PS 637: Performed by: HOSPITALIST

## 2022-10-13 PROCEDURE — 81001 URINALYSIS AUTO W/SCOPE: CPT | Performed by: HOSPITALIST

## 2022-10-13 PROCEDURE — 85048 AUTOMATED LEUKOCYTE COUNT: CPT | Performed by: HOSPITALIST

## 2022-10-13 PROCEDURE — 84145 PROCALCITONIN (PCT): CPT | Performed by: HOSPITALIST

## 2022-10-13 PROCEDURE — 99232 SBSQ HOSP IP/OBS MODERATE 35: CPT | Performed by: HOSPITALIST

## 2022-10-13 PROCEDURE — 36415 COLL VENOUS BLD VENIPUNCTURE: CPT | Performed by: HOSPITALIST

## 2022-10-13 PROCEDURE — 250N000013 HC RX MED GY IP 250 OP 250 PS 637: Performed by: INTERNAL MEDICINE

## 2022-10-13 PROCEDURE — 82040 ASSAY OF SERUM ALBUMIN: CPT | Performed by: HOSPITALIST

## 2022-10-13 PROCEDURE — 87086 URINE CULTURE/COLONY COUNT: CPT | Performed by: HOSPITALIST

## 2022-10-13 PROCEDURE — 80053 COMPREHEN METABOLIC PANEL: CPT | Performed by: HOSPITALIST

## 2022-10-13 RX ORDER — OXYCODONE HYDROCHLORIDE 5 MG/1
5 TABLET ORAL EVERY 4 HOURS PRN
Status: DISCONTINUED | OUTPATIENT
Start: 2022-10-13 | End: 2022-10-15 | Stop reason: HOSPADM

## 2022-10-13 RX ORDER — OXYCODONE HYDROCHLORIDE 5 MG/1
5 TABLET ORAL ONCE
Status: COMPLETED | OUTPATIENT
Start: 2022-10-13 | End: 2022-10-13

## 2022-10-13 RX ORDER — ACETAMINOPHEN 325 MG/1
650 TABLET ORAL ONCE
Status: COMPLETED | OUTPATIENT
Start: 2022-10-13 | End: 2022-10-13

## 2022-10-13 RX ORDER — ERTAPENEM 1 G/1
1 INJECTION, POWDER, LYOPHILIZED, FOR SOLUTION INTRAMUSCULAR; INTRAVENOUS EVERY 24 HOURS
Status: DISCONTINUED | OUTPATIENT
Start: 2022-10-13 | End: 2022-10-15 | Stop reason: HOSPADM

## 2022-10-13 RX ADMIN — OXYCODONE HYDROCHLORIDE 5 MG: 5 TABLET ORAL at 18:58

## 2022-10-13 RX ADMIN — FLUOXETINE HYDROCHLORIDE 60 MG: 20 CAPSULE ORAL at 09:41

## 2022-10-13 RX ADMIN — OXYCODONE HYDROCHLORIDE 5 MG: 5 TABLET ORAL at 04:20

## 2022-10-13 RX ADMIN — ERTAPENEM SODIUM 1 G: 1 INJECTION, POWDER, LYOPHILIZED, FOR SOLUTION INTRAMUSCULAR; INTRAVENOUS at 10:39

## 2022-10-13 RX ADMIN — CIPROFLOXACIN HYDROCHLORIDE 500 MG: 500 TABLET, FILM COATED ORAL at 09:41

## 2022-10-13 RX ADMIN — BUPROPION HYDROCHLORIDE 150 MG: 150 TABLET, FILM COATED, EXTENDED RELEASE ORAL at 09:41

## 2022-10-13 RX ADMIN — AMLODIPINE BESYLATE 5 MG: 5 TABLET ORAL at 09:41

## 2022-10-13 RX ADMIN — ACETAMINOPHEN 650 MG: 325 TABLET, FILM COATED ORAL at 10:39

## 2022-10-13 ASSESSMENT — ACTIVITIES OF DAILY LIVING (ADL)
ADLS_ACUITY_SCORE: 26

## 2022-10-13 NOTE — PROVIDER NOTIFICATION
MD Notification    Notified Person: MD viveros    Notified Person Name:    Notification Date/Time:    Notification Interaction:    Purpose of Notification: pt has HA, no meds ordered. asking for tylenol, but also saying it didn't work. please order options.    Orders Received:    Comments:

## 2022-10-13 NOTE — PROGRESS NOTES
[facility]    Medicine Progress Note - Hospitalist Service    Date of Admission:  10/11/2022    Assessment & Plan        Ms. Bernstein is a 72-year-old female with a past medical history significant for gastroesophageal reflux disease, chronic kidney disease, status post gastric bypass surgery as well as cholecystectomy, who presents to the Emergency Department with right upper quadrant discomfort and found to have both evidence of a possible obstructive process in the liver as well as 2 small right-sided obstructing kidney stones.    Elevated liver transaminases  Possible choledocholithiasis  No abdominal pain. MRCP negative and LFT's are improving.    Continue to monitor     Right-sided obstructing kidney stones with hydronephrosis  E. Coli urinary tract infection   Largest of a 3 mm in the right ureter, but a 1.2 cm stone in the right kidney.    Postoperative day # 2 cysto,right stent placement  No fever but having urinary frequency and WBC chris to 26K.    Repeat urinalysis and urine culture     Check pro-calcitonin     Change ciprofloxacin to ertapenem      Acute kidney injury, obstructive, and chronic kidney disease stage 2  Creatinine   Date Value Ref Range Status   10/13/2022 1.11 (H) 0.52 - 1.04 mg/dL Final   01/29/2020 1.17 (H) 0.52 - 1.04 mg/dL Final   Creatinine was elevated to 1.3, likely due to the obstructive kidney stones.   Creatinine 1.1 today.    Continue to monitor     Mild hypokalemia  Potassium   Date Value Ref Range Status   10/13/2022 4.0 3.4 - 5.3 mmol/L Final   01/29/2020 3.7 3.4 - 5.3 mmol/L Final   Corrected.    Hypertension    Continue prior to admission amlodipine     Depression    Continue prior to admission fluoxetine and bupropion     Headache     Try Tylenol x 1 dose     Diet: Regular Diet Adult    DVT Prophylaxis: Pneumatic Compression Devices  Johnson Catheter: Not present  Central Lines: None  Cardiac Monitoring: None  Code Status: Full Code   "    Disposition Plan      Expected Discharge Date: 10/13/2022                The patient's care was discussed with the Patient.    Kei Elena MD  Hospitalist Service  [facility]  Securely message with the Vocera Web Console (learn more here)  Text page via [employer] Paging/Directory     Clinically Significant Risk Factors Present on Admission               # Obesity: Estimated body mass index is 31.26 kg/m  as calculated from the following:    Height as of this encounter: 1.638 m (5' 4.5\").    Weight as of this encounter: 83.9 kg (185 lb).        ______________________________________________________________________    Interval History   Has a headache and urinary frequency. No flank/suprapubic pain.    Data reviewed today: I reviewed all medications, new labs and imaging results over the last 24 hours. I personally reviewed no images or EKG's today.    Physical Exam   Vital Signs: Temp: 98.7  F (37.1  C) Temp src: Oral BP: 130/80 Pulse: 78   Resp: 16 SpO2: 92 % O2 Device: None (Room air)    Weight: 185 lbs 0 oz  Constitutional: awake, alert, cooperative, no apparent distress  Skin: no rashes and no jaundice  Neuropsychiatric: General: normal, calm and normal eye contact    Data   Recent Labs   Lab 10/13/22  0737 10/12/22  0749 10/11/22  1725 10/11/22  0609 10/10/22  1928   WBC 26.6*  --   --  11.8* 11.3*   HGB  --   --   --  12.2 14.6   MCV  --   --   --  98 98   PLT  --   --   --  171 276    135  --  137 140   POTASSIUM 4.0 4.2 4.1 3.2* 3.3*   CHLORIDE 109 108  --  107 106   CO2 22 17*  --  20 22   BUN 30 41*  --  30 32*   CR 1.11* 1.49*  --  1.39* 1.30*   ANIONGAP 5 10  --  10 12   VICENTE 7.6* 7.3*  --  7.1* 8.5   * 121*  --  140* 122*   ALBUMIN 2.3* 2.5*  --  2.6* 3.3*   PROTTOTAL 5.9* 5.9*  --  5.6* 7.0   BILITOTAL 1.3 1.4*  --  1.5* 1.6*   ALKPHOS 161* 175*  --  200* 190*   * 293*  --  441* 347*   AST 62* 162*  --  647* 834*   LIPASE  --   --   --   --  134 "     Recent Results (from the past 24 hour(s))   XR Chest Port 1 View    Narrative    CHEST ONE VIEW October 12, 2022 10:36 AM     HISTORY: Shortness of breath.    COMPARISON: None.      Impression    IMPRESSION: No acute disease.    ALYSSA GONZALEZ MD         SYSTEM ID:  [id number]     Medications       acetaminophen  650 mg Oral Once     amLODIPine  5 mg Oral Daily     buPROPion  150 mg Oral QAM     ertapenem  1 g Intravenous Q24H     FLUoxetine  60 mg Oral Daily     sodium chloride (PF)  3 mL Intracatheter Q8H

## 2022-10-13 NOTE — PROVIDER NOTIFICATION
MD Notification    Notified Person: MD    Notified Person Name:  Vin    Notification Date/Time: 10/13/2022 @ 0405    Notification Interaction: web-paged    Purpose of Notification: Patient has a headache that she is rating 8/10.  Her creat and LFTs were elevated yesterday, so they didn't give her anything but now it's getting progressively worse    Orders Received: one time dose of oxycodone 5 mg    Comments:

## 2022-10-13 NOTE — PLAN OF CARE
Goal Outcome Evaluation:         POD #2 cysto and R ureteral stent placement.  A&Ox4.  VSS, RA.  Tolerating regular diet without problems.  Independent.  Voiding without problems per patient report, frequency continues but is improving.  Complained of 8/10 headache; one time dose of oxycodone given with adequate relief.  No IV access; transitioned to PO antibiotics yesterday.  Discharge pending progress, probably today; patient will follow up with urology outpatient for stone removal.

## 2022-10-14 LAB
ALBUMIN SERPL-MCNC: 2.5 G/DL (ref 3.4–5)
ALP SERPL-CCNC: 177 U/L (ref 40–150)
ALT SERPL W P-5'-P-CCNC: 136 U/L (ref 0–50)
ANION GAP SERPL CALCULATED.3IONS-SCNC: 5 MMOL/L (ref 3–14)
AST SERPL W P-5'-P-CCNC: 32 U/L (ref 0–45)
ATRIAL RATE - MUSE: 81 BPM
BACTERIA UR CULT: NORMAL
BASOPHILS # BLD AUTO: 0.1 10E3/UL (ref 0–0.2)
BASOPHILS NFR BLD AUTO: 0 %
BILIRUB SERPL-MCNC: 2.1 MG/DL (ref 0.2–1.3)
BUN SERPL-MCNC: 17 MG/DL (ref 7–30)
CALCIUM SERPL-MCNC: 8.2 MG/DL (ref 8.5–10.1)
CHLORIDE BLD-SCNC: 105 MMOL/L (ref 94–109)
CO2 SERPL-SCNC: 24 MMOL/L (ref 20–32)
CREAT SERPL-MCNC: 0.87 MG/DL (ref 0.52–1.04)
DIASTOLIC BLOOD PRESSURE - MUSE: NORMAL MMHG
EOSINOPHIL # BLD AUTO: 0.1 10E3/UL (ref 0–0.7)
EOSINOPHIL NFR BLD AUTO: 1 %
ERYTHROCYTE [DISTWIDTH] IN BLOOD BY AUTOMATED COUNT: 14.3 % (ref 10–15)
GFR SERPL CREATININE-BSD FRML MDRD: 71 ML/MIN/1.73M2
GLUCOSE BLD-MCNC: 93 MG/DL (ref 70–99)
HCT VFR BLD AUTO: 39.8 % (ref 35–47)
HGB BLD-MCNC: 13 G/DL (ref 11.7–15.7)
IMM GRANULOCYTES # BLD: 0.2 10E3/UL
IMM GRANULOCYTES NFR BLD: 1 %
INTERPRETATION ECG - MUSE: NORMAL
LYMPHOCYTES # BLD AUTO: 0.7 10E3/UL (ref 0.8–5.3)
LYMPHOCYTES NFR BLD AUTO: 4 %
MCH RBC QN AUTO: 31.6 PG (ref 26.5–33)
MCHC RBC AUTO-ENTMCNC: 32.7 G/DL (ref 31.5–36.5)
MCV RBC AUTO: 97 FL (ref 78–100)
MONOCYTES # BLD AUTO: 0.9 10E3/UL (ref 0–1.3)
MONOCYTES NFR BLD AUTO: 5 %
NEUTROPHILS # BLD AUTO: 15.4 10E3/UL (ref 1.6–8.3)
NEUTROPHILS NFR BLD AUTO: 89 %
NRBC # BLD AUTO: 0 10E3/UL
NRBC BLD AUTO-RTO: 0 /100
P AXIS - MUSE: 41 DEGREES
PLATELET # BLD AUTO: 136 10E3/UL (ref 150–450)
POTASSIUM BLD-SCNC: 3.8 MMOL/L (ref 3.4–5.3)
PR INTERVAL - MUSE: 158 MS
PROCALCITONIN SERPL-MCNC: 7.61 NG/ML
PROT SERPL-MCNC: 6.6 G/DL (ref 6.8–8.8)
QRS DURATION - MUSE: 76 MS
QT - MUSE: 394 MS
QTC - MUSE: 457 MS
R AXIS - MUSE: -6 DEGREES
RBC # BLD AUTO: 4.12 10E6/UL (ref 3.8–5.2)
SODIUM SERPL-SCNC: 134 MMOL/L (ref 133–144)
SYSTOLIC BLOOD PRESSURE - MUSE: NORMAL MMHG
T AXIS - MUSE: 0 DEGREES
VENTRICULAR RATE- MUSE: 81 BPM
WBC # BLD AUTO: 17.4 10E3/UL (ref 4–11)

## 2022-10-14 PROCEDURE — 36415 COLL VENOUS BLD VENIPUNCTURE: CPT | Performed by: HOSPITALIST

## 2022-10-14 PROCEDURE — 80053 COMPREHEN METABOLIC PANEL: CPT | Performed by: HOSPITALIST

## 2022-10-14 PROCEDURE — 250N000011 HC RX IP 250 OP 636: Performed by: HOSPITALIST

## 2022-10-14 PROCEDURE — 85025 COMPLETE CBC W/AUTO DIFF WBC: CPT | Performed by: HOSPITALIST

## 2022-10-14 PROCEDURE — 99232 SBSQ HOSP IP/OBS MODERATE 35: CPT | Performed by: HOSPITALIST

## 2022-10-14 PROCEDURE — 250N000013 HC RX MED GY IP 250 OP 250 PS 637: Performed by: HOSPITALIST

## 2022-10-14 PROCEDURE — 120N000001 HC R&B MED SURG/OB

## 2022-10-14 PROCEDURE — 84145 PROCALCITONIN (PCT): CPT | Performed by: HOSPITALIST

## 2022-10-14 PROCEDURE — 250N000013 HC RX MED GY IP 250 OP 250 PS 637

## 2022-10-14 RX ORDER — SUMATRIPTAN 25 MG/1
25 TABLET, FILM COATED ORAL ONCE
Status: COMPLETED | OUTPATIENT
Start: 2022-10-14 | End: 2022-10-14

## 2022-10-14 RX ORDER — ACETAMINOPHEN 325 MG/1
650 TABLET ORAL EVERY 4 HOURS PRN
Status: DISCONTINUED | OUTPATIENT
Start: 2022-10-14 | End: 2022-10-15 | Stop reason: HOSPADM

## 2022-10-14 RX ORDER — ELETRIPTAN HYDROBROMIDE 20 MG/1
20 TABLET, FILM COATED ORAL ONCE
Status: DISCONTINUED | OUTPATIENT
Start: 2022-10-14 | End: 2022-10-14 | Stop reason: CLARIF

## 2022-10-14 RX ADMIN — BUPROPION HYDROCHLORIDE 150 MG: 150 TABLET, FILM COATED, EXTENDED RELEASE ORAL at 08:37

## 2022-10-14 RX ADMIN — ERTAPENEM SODIUM 1 G: 1 INJECTION, POWDER, LYOPHILIZED, FOR SOLUTION INTRAMUSCULAR; INTRAVENOUS at 10:20

## 2022-10-14 RX ADMIN — OXYCODONE HYDROCHLORIDE 5 MG: 5 TABLET ORAL at 04:00

## 2022-10-14 RX ADMIN — AMLODIPINE BESYLATE 5 MG: 5 TABLET ORAL at 08:37

## 2022-10-14 RX ADMIN — FLUOXETINE HYDROCHLORIDE 60 MG: 20 CAPSULE ORAL at 08:37

## 2022-10-14 RX ADMIN — SUMATRIPTAN SUCCINATE 25 MG: 25 TABLET ORAL at 11:42

## 2022-10-14 ASSESSMENT — ACTIVITIES OF DAILY LIVING (ADL)
ADLS_ACUITY_SCORE: 30
ADLS_ACUITY_SCORE: 26
ADLS_ACUITY_SCORE: 26
ADLS_ACUITY_SCORE: 30
ADLS_ACUITY_SCORE: 26
ADLS_ACUITY_SCORE: 30
ADLS_ACUITY_SCORE: 26
ADLS_ACUITY_SCORE: 30
ADLS_ACUITY_SCORE: 26
ADLS_ACUITY_SCORE: 30
ADLS_ACUITY_SCORE: 30
ADLS_ACUITY_SCORE: 26

## 2022-10-14 NOTE — PLAN OF CARE
Goal Outcome Evaluation:                    Pt A/Ox4, feeling discourage about not going home.  Showered today which helped.  Temp 99, other vss.  Procalcitonin decreased today.  Ind in room and hallway.  Plan to discharge possibly tomorrow pending urine culture results and labs returning to normal.

## 2022-10-14 NOTE — PLAN OF CARE
Goal Outcome Evaluation:  POD #2 cysto and R ureteral stent placement.  A&Ox4.  VSS, RA. LGF this meghann, no tyl d/t LFTs elevated. Tolerating regular diet, poor appetite.  Independent.  Voiding without problems per patient report, frequency.  Complained of 5/10 headache all day; one time dose of oxycodone given this meghann, tyylenol, ice.  New IV access for IV abx started today due to WBC 26.6..  Discharge pending progress. patient will follow up with urology outpatient for stone removal.

## 2022-10-14 NOTE — PLAN OF CARE
Goal Outcome Evaluation:           POD#3 cysto/R urethral stent placement.  A&Ox4.  VSS, RA.  Independent.  PIV SL.  Complained of ongoing headache; controlled with PRN oxycodone.  Tolerating regular diet without problems. Discharge pending progress.

## 2022-10-14 NOTE — PROGRESS NOTES
[facility]    Medicine Progress Note - Hospitalist Service    Date of Admission:  10/11/2022    Assessment & Plan        Ms. Bernstein is a 72-year-old female with a past medical history significant for gastroesophageal reflux disease, chronic kidney disease, status post gastric bypass surgery as well as cholecystectomy, who presents to the Emergency Department with right upper quadrant discomfort and found to have both evidence of a possible obstructive process in the liver as well as 2 small right-sided obstructing kidney stones.    Elevated liver transaminases- likely due to sepsis  Possible choledocholithiasis  No abdominal pain. MRCP negative and LFT's are improving.    Continue to monitor     Right-sided obstructing kidney stones with hydronephrosis  Complicated urinary tract infection due to above  E. Coli cystitis  Largest of a 3 mm in the right ureter, but a 1.2 cm stone in the right kidney.    Postoperative day # 3 cysto,right stent placement  No fever but having urinary frequency and WBC chris to 26K. Pro-calcitonin was 23.  Antibiotic switched from ciprofloxacin to ertapenem.  First urine culture grew a pan-sensitive E. Coli but on 10/13 urinalysis was still consistent with infection.  Repeat culture pending.  Today her WBC and pro-calcitonin have markedly improved.    Follow up repeat urine culture     Continue intravenous ertapenem     Check PVR due to frequency     Acute kidney injury, obstructive, and chronic kidney disease stage 2- resolved  Creatinine   Date Value Ref Range Status   10/14/2022 0.87 0.52 - 1.04 mg/dL Final   01/29/2020 1.17 (H) 0.52 - 1.04 mg/dL Final   Creatinine was elevated to 1.3, likely due to the obstructive kidney stones.   Creatinine now < 1    Mild hypokalemia  Potassium   Date Value Ref Range Status   10/14/2022 3.8 3.4 - 5.3 mmol/L Final   01/29/2020 3.7 3.4 - 5.3 mmol/L Final   Corrected.    Hypertension    Continue prior to admission amlodipine  "    Depression    Continue prior to admission fluoxetine and bupropion     Headache   Still having bilateral frontal headache     Try Relpax and Tylenol today     Diet: Regular Diet Adult    DVT Prophylaxis: Pneumatic Compression Devices  Johnson Catheter: Not present  Central Lines: None  Cardiac Monitoring: None  Code Status: Full Code      Disposition Plan      Expected Discharge Date: 10/15/2022                The patient's care was discussed with the Patient/nurse    Kei Elena MD  Hospitalist Service  [facility]  Securely message with the Vocera Web Console (learn more here)  Text page via [employer] Paging/Directory     Clinically Significant Risk Factors Present on Admission               # Obesity: Estimated body mass index is 31.26 kg/m  as calculated from the following:    Height as of this encounter: 1.638 m (5' 4.5\").    Weight as of this encounter: 83.9 kg (185 lb).        ______________________________________________________________________    Interval History   Stable overnight.  Still has some frontal headache. No back/flank pain.  No nausea or vomiting.     Data reviewed today: I reviewed all medications, new labs and imaging results over the last 24 hours. I personally reviewed no images or EKG's today.    Physical Exam   Vital Signs: Temp: 99  F (37.2  C) Temp src: Oral BP: (!) 137/97 Pulse: 82   Resp: 15 SpO2: 90 % O2 Device: None (Room air)    Weight: 185 lbs 0 oz  Constitutional: awake, alert, cooperative, no apparent distress  Skin: no rashes and no jaundice  Neuropsychiatric: General: normal, calm and normal eye contact    Data   Recent Labs   Lab 10/14/22  0736 10/13/22  0737 10/12/22  0749 10/11/22  1725 10/11/22  0609 10/10/22  1928   WBC 17.4* 26.6*  --   --  11.8* 11.3*   HGB 13.0  --   --   --  12.2 14.6   MCV 97  --   --   --  98 98   *  --   --   --  171 276    136 135  --  137 140   POTASSIUM 3.8 4.0 4.2   < > 3.2* 3.3*   CHLORIDE 105 109 " 108  --  107 106   CO2 24 22 17*  --  20 22   BUN 17 30 41*  --  30 32*   CR 0.87 1.11* 1.49*  --  1.39* 1.30*   ANIONGAP 5 5 10  --  10 12   VICENTE 8.2* 7.6* 7.3*  --  7.1* 8.5   GLC 93 101* 121*  --  140* 122*   ALBUMIN 2.5* 2.3* 2.5*  --  2.6* 3.3*   PROTTOTAL 6.6* 5.9* 5.9*  --  5.6* 7.0   BILITOTAL 2.1* 1.3 1.4*  --  1.5* 1.6*   ALKPHOS 177* 161* 175*  --  200* 190*   * 185* 293*  --  441* 347*   AST 32 62* 162*  --  647* 834*   LIPASE  --   --   --   --   --  134    < > = values in this interval not displayed.     No results found for this or any previous visit (from the past 24 hour(s)).  Medications       amLODIPine  5 mg Oral Daily     buPROPion  150 mg Oral QAM     ertapenem  1 g Intravenous Q24H     FLUoxetine  60 mg Oral Daily     sodium chloride (PF)  3 mL Intracatheter Q8H

## 2022-10-15 ENCOUNTER — HOME INFUSION (PRE-WILLOW HOME INFUSION) (OUTPATIENT)
Dept: PHARMACY | Facility: CLINIC | Age: 72
End: 2022-10-15

## 2022-10-15 VITALS
WEIGHT: 185 LBS | HEART RATE: 75 BPM | BODY MASS INDEX: 30.82 KG/M2 | OXYGEN SATURATION: 93 % | DIASTOLIC BLOOD PRESSURE: 95 MMHG | HEIGHT: 65 IN | TEMPERATURE: 97.6 F | RESPIRATION RATE: 16 BRPM | SYSTOLIC BLOOD PRESSURE: 156 MMHG

## 2022-10-15 LAB
ALBUMIN SERPL-MCNC: 2.4 G/DL (ref 3.4–5)
ALP SERPL-CCNC: 163 U/L (ref 40–150)
ALT SERPL W P-5'-P-CCNC: 91 U/L (ref 0–50)
AST SERPL W P-5'-P-CCNC: 19 U/L (ref 0–45)
BASOPHILS # BLD MANUAL: 0 10E3/UL (ref 0–0.2)
BASOPHILS NFR BLD MANUAL: 0 %
BILIRUB DIRECT SERPL-MCNC: 0.3 MG/DL (ref 0–0.2)
BILIRUB SERPL-MCNC: 1.7 MG/DL (ref 0.2–1.3)
CREAT SERPL-MCNC: 0.8 MG/DL (ref 0.52–1.04)
EOSINOPHIL # BLD MANUAL: 0.2 10E3/UL (ref 0–0.7)
EOSINOPHIL NFR BLD MANUAL: 2 %
ERYTHROCYTE [DISTWIDTH] IN BLOOD BY AUTOMATED COUNT: 14 % (ref 10–15)
GFR SERPL CREATININE-BSD FRML MDRD: 78 ML/MIN/1.73M2
HCT VFR BLD AUTO: 40.1 % (ref 35–47)
HGB BLD-MCNC: 13.1 G/DL (ref 11.7–15.7)
LYMPHOCYTES # BLD MANUAL: 0.6 10E3/UL (ref 0.8–5.3)
LYMPHOCYTES NFR BLD MANUAL: 6 %
MCH RBC QN AUTO: 31.5 PG (ref 26.5–33)
MCHC RBC AUTO-ENTMCNC: 32.7 G/DL (ref 31.5–36.5)
MCV RBC AUTO: 96 FL (ref 78–100)
METAMYELOCYTES # BLD MANUAL: 0.1 10E3/UL
METAMYELOCYTES NFR BLD MANUAL: 1 %
MONOCYTES # BLD MANUAL: 0.8 10E3/UL (ref 0–1.3)
MONOCYTES NFR BLD MANUAL: 8 %
NEUTROPHILS # BLD MANUAL: 8.4 10E3/UL (ref 1.6–8.3)
NEUTROPHILS NFR BLD MANUAL: 83 %
PLAT MORPH BLD: ABNORMAL
PLATELET # BLD AUTO: 139 10E3/UL (ref 150–450)
POTASSIUM BLD-SCNC: 3.4 MMOL/L (ref 3.4–5.3)
PROCALCITONIN SERPL-MCNC: 3.1 NG/ML
PROT SERPL-MCNC: 6.4 G/DL (ref 6.8–8.8)
RBC # BLD AUTO: 4.16 10E6/UL (ref 3.8–5.2)
RBC MORPH BLD: ABNORMAL
WBC # BLD AUTO: 10.1 10E3/UL (ref 4–11)

## 2022-10-15 PROCEDURE — 85027 COMPLETE CBC AUTOMATED: CPT | Performed by: HOSPITALIST

## 2022-10-15 PROCEDURE — 36415 COLL VENOUS BLD VENIPUNCTURE: CPT | Performed by: HOSPITALIST

## 2022-10-15 PROCEDURE — 272N000433 HC KIT CATH IV 18 OR 20G CM, POWERGLIDE W MAX BARRIER

## 2022-10-15 PROCEDURE — 80076 HEPATIC FUNCTION PANEL: CPT | Performed by: HOSPITALIST

## 2022-10-15 PROCEDURE — 84132 ASSAY OF SERUM POTASSIUM: CPT | Performed by: HOSPITALIST

## 2022-10-15 PROCEDURE — 250N000011 HC RX IP 250 OP 636: Performed by: HOSPITALIST

## 2022-10-15 PROCEDURE — 250N000013 HC RX MED GY IP 250 OP 250 PS 637: Performed by: HOSPITALIST

## 2022-10-15 PROCEDURE — 85007 BL SMEAR W/DIFF WBC COUNT: CPT | Performed by: HOSPITALIST

## 2022-10-15 PROCEDURE — 250N000009 HC RX 250: Performed by: HOSPITALIST

## 2022-10-15 PROCEDURE — 82565 ASSAY OF CREATININE: CPT | Performed by: HOSPITALIST

## 2022-10-15 PROCEDURE — 84145 PROCALCITONIN (PCT): CPT | Performed by: HOSPITALIST

## 2022-10-15 PROCEDURE — 99239 HOSP IP/OBS DSCHRG MGMT >30: CPT | Performed by: HOSPITALIST

## 2022-10-15 PROCEDURE — 36569 INSJ PICC 5 YR+ W/O IMAGING: CPT

## 2022-10-15 RX ORDER — ALBUTEROL SULFATE 90 UG/1
1-2 AEROSOL, METERED RESPIRATORY (INHALATION)
Status: CANCELLED
Start: 2022-10-16

## 2022-10-15 RX ORDER — ALBUTEROL SULFATE 0.83 MG/ML
2.5 SOLUTION RESPIRATORY (INHALATION)
Status: CANCELLED | OUTPATIENT
Start: 2022-10-16

## 2022-10-15 RX ORDER — EPINEPHRINE 1 MG/ML
0.3 INJECTION, SOLUTION, CONCENTRATE INTRAVENOUS EVERY 5 MIN PRN
Status: CANCELLED | OUTPATIENT
Start: 2022-10-16

## 2022-10-15 RX ORDER — METHYLPREDNISOLONE SODIUM SUCCINATE 125 MG/2ML
125 INJECTION, POWDER, LYOPHILIZED, FOR SOLUTION INTRAMUSCULAR; INTRAVENOUS
Status: CANCELLED
Start: 2022-10-16

## 2022-10-15 RX ORDER — ERTAPENEM 1 G/1
1 INJECTION, POWDER, LYOPHILIZED, FOR SOLUTION INTRAMUSCULAR; INTRAVENOUS EVERY 24 HOURS
Status: CANCELLED | OUTPATIENT
Start: 2022-10-16

## 2022-10-15 RX ORDER — DIPHENHYDRAMINE HYDROCHLORIDE 50 MG/ML
50 INJECTION INTRAMUSCULAR; INTRAVENOUS
Status: CANCELLED
Start: 2022-10-16

## 2022-10-15 RX ORDER — ERTAPENEM 1 G/1
1 INJECTION, POWDER, LYOPHILIZED, FOR SOLUTION INTRAMUSCULAR; INTRAVENOUS EVERY 24 HOURS
Qty: 7 EACH | Refills: 0 | Status: SHIPPED | OUTPATIENT
Start: 2022-10-16 | End: 2022-10-23

## 2022-10-15 RX ORDER — MEPERIDINE HYDROCHLORIDE 25 MG/ML
25 INJECTION INTRAMUSCULAR; INTRAVENOUS; SUBCUTANEOUS EVERY 30 MIN PRN
Status: CANCELLED | OUTPATIENT
Start: 2022-10-16

## 2022-10-15 RX ADMIN — AMLODIPINE BESYLATE 5 MG: 5 TABLET ORAL at 08:18

## 2022-10-15 RX ADMIN — FLUOXETINE HYDROCHLORIDE 60 MG: 20 CAPSULE ORAL at 08:18

## 2022-10-15 RX ADMIN — LIDOCAINE HYDROCHLORIDE 3 ML: 10 INJECTION, SOLUTION INFILTRATION; PERINEURAL at 12:49

## 2022-10-15 RX ADMIN — ERTAPENEM SODIUM 1 G: 1 INJECTION, POWDER, LYOPHILIZED, FOR SOLUTION INTRAMUSCULAR; INTRAVENOUS at 08:17

## 2022-10-15 RX ADMIN — BUPROPION HYDROCHLORIDE 150 MG: 150 TABLET, FILM COATED, EXTENDED RELEASE ORAL at 08:18

## 2022-10-15 ASSESSMENT — ACTIVITIES OF DAILY LIVING (ADL)
ADLS_ACUITY_SCORE: 30

## 2022-10-15 NOTE — PLAN OF CARE
VSS on RA. Denied headache or pain. Oriented x4. Needs home abx- midline IV placed. Up independently. CC consulted. Plan for IV abx for 1 week at  infusion clinic.     Patient discharged to home via private vehicle  accompanied by friend.  IV: Discontinued, PICC SL and covered.  Prescriptions N/A.   Belongings reviewed and sent with patient.   Home medications returned to patient: NA  Equipment sent with: patient.   patient verbalizes understanding of discharge instructions. AVS given to patient.

## 2022-10-15 NOTE — PLAN OF CARE
Goal Outcome Evaluation:                      10/14/22-10/15/22 2321-1300  A&Ox4. VSS on RA. Procalcitonin decreased today. Ind in room and hallway. Regular diet. Plan to discharge possibly today pending urine culture results and labs returning to normal.

## 2022-10-15 NOTE — CONSULTS
Care Management Initial Consult    General Information  Assessment completed with: Patient,    Type of CM/SW Visit: Initial Assessment    Primary Care Provider verified and updated as needed:     Readmission within the last 30 days:        Reason for Consult: discharge planning  Advance Care Planning: Advance Care Planning Reviewed: no concerns identified        Communication Assessment  Patient's communication style: spoken language (English or Bilingual)    Hearing Difficulty or Deaf: no   Wear Glasses or Blind: yes    Cognitive  Cognitive/Neuro/Behavioral: WDL        Orientation: oriented x 4     Best Language: 0 - No aphasia  Speech: spontaneous, logical    Living Environment:   People in home: alone     Current living Arrangements: house      Able to return to prior arrangements: yes     Family/Social Support:  Care provided by: self  Provides care for: no one     Sibling(s)          Description of Support System: Supportive, Involved       Current Resources:   Patient receiving home care services: No     Community Resources: OP Mental Health  Equipment currently used at home: walker, standard  Supplies currently used at home:      Employment/Financial:  Employment Status: retired        Financial Concerns: No concerns identified   Referral to Financial Worker: No       Lifestyle & Psychosocial Needs:  Social Determinants of Health     Tobacco Use: Medium Risk     Smoking Tobacco Use: Former     Smokeless Tobacco Use: Never     Passive Exposure: Not on file   Alcohol Use: Not on file   Financial Resource Strain: Not on file   Food Insecurity: Not on file   Transportation Needs: Not on file   Physical Activity: Not on file   Stress: Not on file   Social Connections: Not on file   Intimate Partner Violence: Not on file   Depression: Not on file   Housing Stability: Not on file       Functional Status:  Prior to admission patient needed assistance:      Mental Health Status:  Mental Health Status: No Current  Concerns       Chemical Dependency Status:  Chemical Dependency Status: No Current Concerns           Values/Beliefs:  Spiritual, Cultural Beliefs, Buddhism Practices, Values that affect care: no             Additional Information:  Bedside RN informed CC that patient will be discharging home with IV abx. SW/CC consulted for discharge planning. CC met with Leonor at the bedside. Introduced self and role. Leonor expressed anxiety about discharging home on IV abx therapy. Writer provided reassurance as Leonor is A&O and appears very capable of learning administration procedure. Writer provided education about the referral process for [facility]. Pt is in agreement but still appears hesitant. Hospitalist at bedside.  Writer spoke with on call service and page sent to Debbi Carmichael RN with [facility]. Referral emailed to [facility]. Waiting for call back at this time.   Pt reports her brother will give her a ride home today.  Addendum 1245: Second page out to [facility].   Per [facility] intake, pt's insurance does not cover home infusion. Leonor is agreeable to receiving medication at an infusion center. Writer called [facility]. There is an appointment available at 11:00 AM tomorrow, [place] 10/16.    Patricia Loza RN  Inpatient Care Coordinator    Care Management Discharge Note    Discharge Date: 10/15/2022       Discharge Disposition: Outpatient Infusion Services    Discharge Services: None    Discharge DME: None    Discharge Transportation: family or friend will provide    Private pay costs discussed: Not applicable    PAS Confirmation Code:    Patient/family educated on Medicare website which has current facility and service quality ratings: yes    Education Provided on the Discharge Plan:    Persons Notified of Discharge Plans: patient, bedside RN  Patient/Family in Agreement with the Plan: yes    Handoff Referral Completed: No    Additional Information:  Updated patient at bedside about infusion appointment  tomorrow, Garcia 10/16 at 11:00 AM. Instructed her that they will arrange for the remaining appointments while she is there tomorrow. Leonor is going to call her brother for a ride home. Expresses gratitude for discharge assistance.     Patricia Loza RN  Inpatient Care Coordinator

## 2022-10-15 NOTE — PROGRESS NOTES
Therapy:IV ABX   Insurance: [employer]    Pt has a Medicare replacement plan (which follows Medicare guidelines), which does not cover IV ABX in the home. (Pt would have coverage for short term TCU or IC). Below is what pt would be responsible for if pt wanted to go w [facility] home infusion      Pt would have to self-pay for the drug dispensed & per-marilou (daily)    If not homebound, nursing would also be self-pay (per visit)    Cost for Ertapenem 1gm Q24 is $101.05 per day for drug and supplies    Please contact Intake with any questions, [phone] or In Basket pool, [facility] ([phone]).

## 2022-10-15 NOTE — PROCEDURES
[facility]    Single Lumen Midline Placement    Date/Time: 10/15/2022 12:36 PM  Performed by: Carlos Boone RN  Authorized by: Kei Elena MD   Indications: vascular access      UNIVERSAL PROTOCOL   Site Marked: Yes  Prior Images Obtained and Reviewed:  Yes  Required items: Required blood products, implants, devices and special equipment available    Patient identity confirmed:  Verbally with patient, arm band and hospital-assigned identification number  NA - No sedation, light sedation, or local anesthesia  Confirmation Checklist:  Patient's identity using two indicators, relevant allergies, procedure was appropriate and matched the consent or emergent situation and correct equipment/implants were available  Time out: Immediately prior to the procedure a time out was called    Universal Protocol: the Joint Commission Universal Protocol was followed    Preparation: Patient was prepped and draped in usual sterile fashion       ANESTHESIA    Anesthesia: See MAR for details  Local Anesthetic:  Lidocaine 1% without epinephrine      SEDATION    Patient Sedated: No        Preparation: skin prepped with 2% chlorhexidine  Skin prep agent: skin prep agent completely dried prior to procedure  Sterile barriers: maximum sterile barriers were used: cap, mask, sterile gown, sterile gloves, and large sterile sheet  Hand hygiene: hand hygiene performed prior to central venous catheter insertion  Type of line used: Midline  Catheter type: single lumen  Lumen type: non-valved  Catheter size: 4 Fr  Brand: Bard  Lot number: [id number]  Placement method: MST, ultrasound and venipuncture  Number of attempts: 3  Difficulty threading catheter: yes  Successful placement: yes  Orientation: right    Location: basilic vein  Tip Location: distal to axillary vein  Arm circumference: adults 10 cm  Extremity circumference: 32  Visible catheter length: 2  Internal length: 10 cm  Total catheter length:  12  Dressing and securement: site cleansed, chlorhexidine patch applied, statlock, sterile dressing applied and transparent dressing  Post procedure assessment: blood return through all ports  PROCEDURE   Patient Tolerance:  Patient tolerated the procedure well with no immediate complicationsDescribe Procedure: Patient alert and oriented. Education on midline provided. Patient verbalized understanding.
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
## 2022-10-16 ENCOUNTER — INFUSION THERAPY VISIT (OUTPATIENT)
Dept: INFUSION THERAPY | Facility: CLINIC | Age: 72
End: 2022-10-16
Attending: HOSPITALIST
Payer: COMMERCIAL

## 2022-10-16 VITALS
TEMPERATURE: 97.8 F | HEART RATE: 90 BPM | OXYGEN SATURATION: 96 % | SYSTOLIC BLOOD PRESSURE: 130 MMHG | RESPIRATION RATE: 20 BRPM | DIASTOLIC BLOOD PRESSURE: 86 MMHG

## 2022-10-16 DIAGNOSIS — N39.0 URINARY TRACT INFECTION IN FEMALE: Primary | ICD-10-CM

## 2022-10-16 PROCEDURE — 250N000011 HC RX IP 250 OP 636: Performed by: HOSPITALIST

## 2022-10-16 PROCEDURE — 250N000009 HC RX 250: Performed by: HOSPITALIST

## 2022-10-16 PROCEDURE — 96374 THER/PROPH/DIAG INJ IV PUSH: CPT

## 2022-10-16 RX ORDER — DIPHENHYDRAMINE HYDROCHLORIDE 50 MG/ML
50 INJECTION INTRAMUSCULAR; INTRAVENOUS
Status: CANCELLED
Start: 2022-10-17

## 2022-10-16 RX ORDER — EPINEPHRINE 1 MG/ML
0.3 INJECTION, SOLUTION INTRAMUSCULAR; SUBCUTANEOUS EVERY 5 MIN PRN
Status: CANCELLED | OUTPATIENT
Start: 2022-10-17

## 2022-10-16 RX ORDER — ALBUTEROL SULFATE 0.83 MG/ML
2.5 SOLUTION RESPIRATORY (INHALATION)
Status: CANCELLED | OUTPATIENT
Start: 2022-10-17

## 2022-10-16 RX ORDER — ALBUTEROL SULFATE 90 UG/1
1-2 AEROSOL, METERED RESPIRATORY (INHALATION)
Status: CANCELLED
Start: 2022-10-17

## 2022-10-16 RX ORDER — MEPERIDINE HYDROCHLORIDE 25 MG/ML
25 INJECTION INTRAMUSCULAR; INTRAVENOUS; SUBCUTANEOUS EVERY 30 MIN PRN
Status: CANCELLED | OUTPATIENT
Start: 2022-10-17

## 2022-10-16 RX ORDER — METHYLPREDNISOLONE SODIUM SUCCINATE 125 MG/2ML
125 INJECTION, POWDER, LYOPHILIZED, FOR SOLUTION INTRAMUSCULAR; INTRAVENOUS
Status: CANCELLED
Start: 2022-10-17

## 2022-10-16 RX ADMIN — ERTAPENEM SODIUM 1 G: 1 INJECTION, POWDER, LYOPHILIZED, FOR SOLUTION INTRAMUSCULAR; INTRAVENOUS at 11:00

## 2022-10-16 ASSESSMENT — PAIN SCALES - GENERAL: PAINLEVEL: NO PAIN (0)

## 2022-10-16 NOTE — PROGRESS NOTES
Infusion Nursing Note:  Leonor Bernstein presents today for invanz.    Patient seen by provider today: No   present during visit today: Not Applicable.    Note: N/A.    Intravenous Access:  Midline.    Treatment Conditions:  Not Applicable.    Post Infusion Assessment:  Patient tolerated infusion without incident.  Blood return noted pre and post infusion.  Site patent and intact, free from redness, edema or discomfort.  No evidence of extravasations.  Access discontinued per protocol.     Discharge Plan:   Discharge instructions reviewed with: Patient/family.  Patient and/or family verbalized understanding of discharge instructions and all questions answered.  AVS to patient via [employer].  Patient will return 10/17 for next appointment.   Patient discharged in stable condition accompanied by: self.  Departure Mode: Ambulatory.      Chandni Tobar RN

## 2022-10-17 ENCOUNTER — INFUSION THERAPY VISIT (OUTPATIENT)
Dept: INFUSION THERAPY | Facility: CLINIC | Age: 72
End: 2022-10-17
Attending: HOSPITALIST
Payer: COMMERCIAL

## 2022-10-17 VITALS
BODY MASS INDEX: 32.06 KG/M2 | OXYGEN SATURATION: 97 % | HEIGHT: 64 IN | TEMPERATURE: 97.5 F | SYSTOLIC BLOOD PRESSURE: 146 MMHG | HEART RATE: 85 BPM | RESPIRATION RATE: 18 BRPM | DIASTOLIC BLOOD PRESSURE: 81 MMHG | WEIGHT: 187.8 LBS

## 2022-10-17 DIAGNOSIS — N39.0 URINARY TRACT INFECTION IN FEMALE: Primary | ICD-10-CM

## 2022-10-17 PROCEDURE — 250N000011 HC RX IP 250 OP 636: Performed by: HOSPITALIST

## 2022-10-17 PROCEDURE — 96374 THER/PROPH/DIAG INJ IV PUSH: CPT

## 2022-10-17 PROCEDURE — 250N000009 HC RX 250: Performed by: HOSPITALIST

## 2022-10-17 RX ORDER — METHYLPREDNISOLONE SODIUM SUCCINATE 125 MG/2ML
125 INJECTION, POWDER, LYOPHILIZED, FOR SOLUTION INTRAMUSCULAR; INTRAVENOUS
Status: CANCELLED
Start: 2022-10-18

## 2022-10-17 RX ORDER — ALBUTEROL SULFATE 90 UG/1
1-2 AEROSOL, METERED RESPIRATORY (INHALATION)
Status: CANCELLED
Start: 2022-10-18

## 2022-10-17 RX ORDER — MEPERIDINE HYDROCHLORIDE 25 MG/ML
25 INJECTION INTRAMUSCULAR; INTRAVENOUS; SUBCUTANEOUS EVERY 30 MIN PRN
Status: CANCELLED | OUTPATIENT
Start: 2022-10-18

## 2022-10-17 RX ORDER — DIPHENHYDRAMINE HYDROCHLORIDE 50 MG/ML
50 INJECTION INTRAMUSCULAR; INTRAVENOUS
Status: CANCELLED
Start: 2022-10-18

## 2022-10-17 RX ORDER — EPINEPHRINE 1 MG/ML
0.3 INJECTION, SOLUTION INTRAMUSCULAR; SUBCUTANEOUS EVERY 5 MIN PRN
Status: CANCELLED | OUTPATIENT
Start: 2022-10-18

## 2022-10-17 RX ORDER — ALBUTEROL SULFATE 0.83 MG/ML
2.5 SOLUTION RESPIRATORY (INHALATION)
Status: CANCELLED | OUTPATIENT
Start: 2022-10-18

## 2022-10-17 RX ADMIN — ERTAPENEM SODIUM 1 G: 1 INJECTION, POWDER, LYOPHILIZED, FOR SOLUTION INTRAMUSCULAR; INTRAVENOUS at 08:12

## 2022-10-17 NOTE — PROGRESS NOTES
Infusion Nursing Note:  Leonor Bernstein presents today for Invanz .    Patient seen by provider today: No   present during visit today: Not Applicable.    Note: Midline noted to be patent, flushing well and pt stating she can taste the saline, but no blood return noted. Some bruising noted at the midline site, which pt reports has been there all along, and it in not a new finding per pt report.    Intravenous Access:  Midline.    Treatment Conditions:  Not Applicable.    Post Infusion Assessment:  Patient tolerated infusion without incident.  Site patent and intact, free from redness, edema or discomfort.  No evidence of extravasations.     Discharge Plan:   Patient declined prescription refills.  Discharge instructions reviewed with: Patient.  Patient and/or family verbalized understanding of discharge instructions and all questions answered.  AVS to patient via [employer].  Patient will return 10/18/22 for next appointment.   Patient discharged in stable condition accompanied by: friend.  Departure Mode: Ambulatory.      Krystle Ramirez RN

## 2022-10-18 ENCOUNTER — INFUSION THERAPY VISIT (OUTPATIENT)
Dept: INFUSION THERAPY | Facility: CLINIC | Age: 72
End: 2022-10-18
Attending: HOSPITALIST
Payer: COMMERCIAL

## 2022-10-18 VITALS
HEART RATE: 75 BPM | SYSTOLIC BLOOD PRESSURE: 129 MMHG | RESPIRATION RATE: 18 BRPM | DIASTOLIC BLOOD PRESSURE: 77 MMHG | OXYGEN SATURATION: 97 % | TEMPERATURE: 97.7 F

## 2022-10-18 DIAGNOSIS — N39.0 URINARY TRACT INFECTION IN FEMALE: Primary | ICD-10-CM

## 2022-10-18 PROCEDURE — 250N000011 HC RX IP 250 OP 636: Performed by: HOSPITALIST

## 2022-10-18 PROCEDURE — 96374 THER/PROPH/DIAG INJ IV PUSH: CPT

## 2022-10-18 PROCEDURE — 250N000009 HC RX 250: Performed by: HOSPITALIST

## 2022-10-18 RX ORDER — ALBUTEROL SULFATE 90 UG/1
1-2 AEROSOL, METERED RESPIRATORY (INHALATION)
Status: CANCELLED
Start: 2022-10-19

## 2022-10-18 RX ORDER — EPINEPHRINE 1 MG/ML
0.3 INJECTION, SOLUTION INTRAMUSCULAR; SUBCUTANEOUS EVERY 5 MIN PRN
Status: CANCELLED | OUTPATIENT
Start: 2022-10-19

## 2022-10-18 RX ORDER — MEPERIDINE HYDROCHLORIDE 25 MG/ML
25 INJECTION INTRAMUSCULAR; INTRAVENOUS; SUBCUTANEOUS EVERY 30 MIN PRN
Status: CANCELLED | OUTPATIENT
Start: 2022-10-19

## 2022-10-18 RX ORDER — METHYLPREDNISOLONE SODIUM SUCCINATE 125 MG/2ML
125 INJECTION, POWDER, LYOPHILIZED, FOR SOLUTION INTRAMUSCULAR; INTRAVENOUS
Status: CANCELLED
Start: 2022-10-19

## 2022-10-18 RX ORDER — ALBUTEROL SULFATE 0.83 MG/ML
2.5 SOLUTION RESPIRATORY (INHALATION)
Status: CANCELLED | OUTPATIENT
Start: 2022-10-19

## 2022-10-18 RX ORDER — DIPHENHYDRAMINE HYDROCHLORIDE 50 MG/ML
50 INJECTION INTRAMUSCULAR; INTRAVENOUS
Status: CANCELLED
Start: 2022-10-19

## 2022-10-18 RX ADMIN — ERTAPENEM SODIUM 1 G: 1 INJECTION, POWDER, LYOPHILIZED, FOR SOLUTION INTRAMUSCULAR; INTRAVENOUS at 07:39

## 2022-10-18 NOTE — PROGRESS NOTES
Infusion Nursing Note:  Leonor Bernstein presents today for Invanz.    Patient seen by provider today: No   present during visit today: Not Applicable.    Note: NO blood return noted in midline.    Intravenous Access:  Midline.    Treatment Conditions:  Not Applicable.    Post Infusion Assessment:  Patient tolerated infusion without incident.  Site patent and intact, free from redness, edema or discomfort.  No evidence of extravasations.     Discharge Plan:   Patient declined prescription refills.  Discharge instructions reviewed with: Patient.  Patient and/or family verbalized understanding of discharge instructions and all questions answered.  AVS to patient via [employer].  Patient will return 10/19 for next appointment.   Patient discharged in stable condition accompanied by: self.  Departure Mode: Ambulatory.      Kory Camilo RN

## 2022-10-19 ENCOUNTER — ALLIED HEALTH/NURSE VISIT (OUTPATIENT)
Dept: INFUSION THERAPY | Facility: CLINIC | Age: 72
End: 2022-10-19
Attending: HOSPITALIST
Payer: COMMERCIAL

## 2022-10-19 VITALS
DIASTOLIC BLOOD PRESSURE: 75 MMHG | TEMPERATURE: 97.4 F | OXYGEN SATURATION: 97 % | SYSTOLIC BLOOD PRESSURE: 131 MMHG | RESPIRATION RATE: 20 BRPM | HEART RATE: 74 BPM

## 2022-10-19 DIAGNOSIS — N39.0 URINARY TRACT INFECTION IN FEMALE: Primary | ICD-10-CM

## 2022-10-19 PROCEDURE — 96374 THER/PROPH/DIAG INJ IV PUSH: CPT

## 2022-10-19 PROCEDURE — 250N000009 HC RX 250: Performed by: HOSPITALIST

## 2022-10-19 PROCEDURE — 250N000011 HC RX IP 250 OP 636: Performed by: HOSPITALIST

## 2022-10-19 RX ORDER — ALBUTEROL SULFATE 0.83 MG/ML
2.5 SOLUTION RESPIRATORY (INHALATION)
Status: CANCELLED | OUTPATIENT
Start: 2022-10-20

## 2022-10-19 RX ORDER — METHYLPREDNISOLONE SODIUM SUCCINATE 125 MG/2ML
125 INJECTION, POWDER, LYOPHILIZED, FOR SOLUTION INTRAMUSCULAR; INTRAVENOUS
Status: CANCELLED
Start: 2022-10-20

## 2022-10-19 RX ORDER — ALBUTEROL SULFATE 90 UG/1
1-2 AEROSOL, METERED RESPIRATORY (INHALATION)
Status: CANCELLED
Start: 2022-10-20

## 2022-10-19 RX ORDER — DIPHENHYDRAMINE HYDROCHLORIDE 50 MG/ML
50 INJECTION INTRAMUSCULAR; INTRAVENOUS
Status: CANCELLED
Start: 2022-10-20

## 2022-10-19 RX ORDER — MEPERIDINE HYDROCHLORIDE 25 MG/ML
25 INJECTION INTRAMUSCULAR; INTRAVENOUS; SUBCUTANEOUS EVERY 30 MIN PRN
Status: CANCELLED | OUTPATIENT
Start: 2022-10-20

## 2022-10-19 RX ORDER — EPINEPHRINE 1 MG/ML
0.3 INJECTION, SOLUTION INTRAMUSCULAR; SUBCUTANEOUS EVERY 5 MIN PRN
Status: CANCELLED | OUTPATIENT
Start: 2022-10-20

## 2022-10-19 RX ADMIN — ERTAPENEM SODIUM 1 G: 1 INJECTION, POWDER, LYOPHILIZED, FOR SOLUTION INTRAMUSCULAR; INTRAVENOUS at 13:40

## 2022-10-19 ASSESSMENT — PAIN SCALES - GENERAL: PAINLEVEL: NO PAIN (0)

## 2022-10-19 NOTE — PROGRESS NOTES
Infusion Nursing Note:  Leonor Bernstein presents today for invanz.    Patient seen by provider today: No   present during visit today: Not Applicable.    Note: N/A.    Intravenous Access:  Midline.    Treatment Conditions:  Not Applicable.    Post Infusion Assessment:  Patient tolerated infusion without incident.  Site patent and intact, free from redness, edema or discomfort.  No evidence of extravasations.     Discharge Plan:   AVS to patient via [employer].  Patient will return 10/20 for next appointment.   Patient discharged in stable condition accompanied by: self.  Departure Mode: Ambulatory.      Bonilla Estes RN

## 2022-10-20 ENCOUNTER — ALLIED HEALTH/NURSE VISIT (OUTPATIENT)
Dept: INFUSION THERAPY | Facility: CLINIC | Age: 72
End: 2022-10-20
Attending: HOSPITALIST
Payer: COMMERCIAL

## 2022-10-20 VITALS
SYSTOLIC BLOOD PRESSURE: 129 MMHG | DIASTOLIC BLOOD PRESSURE: 77 MMHG | RESPIRATION RATE: 20 BRPM | HEART RATE: 77 BPM | OXYGEN SATURATION: 97 % | TEMPERATURE: 97.9 F

## 2022-10-20 DIAGNOSIS — N39.0 URINARY TRACT INFECTION IN FEMALE: Primary | ICD-10-CM

## 2022-10-20 PROCEDURE — 96374 THER/PROPH/DIAG INJ IV PUSH: CPT

## 2022-10-20 PROCEDURE — 250N000011 HC RX IP 250 OP 636: Performed by: HOSPITALIST

## 2022-10-20 PROCEDURE — 250N000009 HC RX 250: Performed by: HOSPITALIST

## 2022-10-20 RX ORDER — METHYLPREDNISOLONE SODIUM SUCCINATE 125 MG/2ML
125 INJECTION, POWDER, LYOPHILIZED, FOR SOLUTION INTRAMUSCULAR; INTRAVENOUS
Status: CANCELLED
Start: 2022-10-21

## 2022-10-20 RX ORDER — DIPHENHYDRAMINE HYDROCHLORIDE 50 MG/ML
50 INJECTION INTRAMUSCULAR; INTRAVENOUS
Status: CANCELLED
Start: 2022-10-21

## 2022-10-20 RX ORDER — EPINEPHRINE 1 MG/ML
0.3 INJECTION, SOLUTION INTRAMUSCULAR; SUBCUTANEOUS EVERY 5 MIN PRN
Status: CANCELLED | OUTPATIENT
Start: 2022-10-21

## 2022-10-20 RX ORDER — ALBUTEROL SULFATE 0.83 MG/ML
2.5 SOLUTION RESPIRATORY (INHALATION)
Status: CANCELLED | OUTPATIENT
Start: 2022-10-21

## 2022-10-20 RX ORDER — ALBUTEROL SULFATE 90 UG/1
1-2 AEROSOL, METERED RESPIRATORY (INHALATION)
Status: CANCELLED
Start: 2022-10-21

## 2022-10-20 RX ORDER — MEPERIDINE HYDROCHLORIDE 25 MG/ML
25 INJECTION INTRAMUSCULAR; INTRAVENOUS; SUBCUTANEOUS EVERY 30 MIN PRN
Status: CANCELLED | OUTPATIENT
Start: 2022-10-21

## 2022-10-20 RX ADMIN — ERTAPENEM SODIUM 1 G: 1 INJECTION, POWDER, LYOPHILIZED, FOR SOLUTION INTRAMUSCULAR; INTRAVENOUS at 13:28

## 2022-10-20 NOTE — PROGRESS NOTES
Infusion Nursing Note:  Leonor Bernstein presents today for Invanz.    Patient seen by provider today: No   present during visit today: Not Applicable.    Note: N/A.    Intravenous Access:  Midline.    Treatment Conditions:  Not Applicable.    Post Infusion Assessment:  Patient tolerated infusion without incident.  Blood return noted pre and post infusion.  Site patent and intact, free from redness, edema or discomfort.  No evidence of extravasations.     Discharge Plan:   Patient declined prescription refills.  Discharge instructions reviewed with: Patient.  Patient verbalized understanding of discharge instructions and all questions answered.  AVS to patient via "[employer]  Patient will return 10/21/22 for next appointment.   Patient discharged in stable condition accompanied by: self.  Departure Mode: Ambulatory.      Nurys Shaw RN                       Aortic dilatation

## 2022-10-21 ENCOUNTER — ALLIED HEALTH/NURSE VISIT (OUTPATIENT)
Dept: INFUSION THERAPY | Facility: CLINIC | Age: 72
End: 2022-10-21
Attending: HOSPITALIST
Payer: COMMERCIAL

## 2022-10-21 VITALS
HEART RATE: 75 BPM | TEMPERATURE: 98 F | DIASTOLIC BLOOD PRESSURE: 79 MMHG | SYSTOLIC BLOOD PRESSURE: 127 MMHG | RESPIRATION RATE: 18 BRPM | OXYGEN SATURATION: 96 %

## 2022-10-21 DIAGNOSIS — N39.0 URINARY TRACT INFECTION IN FEMALE: Primary | ICD-10-CM

## 2022-10-21 PROCEDURE — 96374 THER/PROPH/DIAG INJ IV PUSH: CPT

## 2022-10-21 PROCEDURE — 250N000009 HC RX 250: Performed by: HOSPITALIST

## 2022-10-21 PROCEDURE — 250N000011 HC RX IP 250 OP 636: Performed by: HOSPITALIST

## 2022-10-21 RX ORDER — METHYLPREDNISOLONE SODIUM SUCCINATE 125 MG/2ML
125 INJECTION, POWDER, LYOPHILIZED, FOR SOLUTION INTRAMUSCULAR; INTRAVENOUS
Status: CANCELLED
Start: 2022-10-22

## 2022-10-21 RX ORDER — ALBUTEROL SULFATE 0.83 MG/ML
2.5 SOLUTION RESPIRATORY (INHALATION)
Status: CANCELLED | OUTPATIENT
Start: 2022-10-22

## 2022-10-21 RX ORDER — EPINEPHRINE 1 MG/ML
0.3 INJECTION, SOLUTION INTRAMUSCULAR; SUBCUTANEOUS EVERY 5 MIN PRN
Status: CANCELLED | OUTPATIENT
Start: 2022-10-22

## 2022-10-21 RX ORDER — ALBUTEROL SULFATE 90 UG/1
1-2 AEROSOL, METERED RESPIRATORY (INHALATION)
Status: CANCELLED
Start: 2022-10-22

## 2022-10-21 RX ORDER — MEPERIDINE HYDROCHLORIDE 25 MG/ML
25 INJECTION INTRAMUSCULAR; INTRAVENOUS; SUBCUTANEOUS EVERY 30 MIN PRN
Status: CANCELLED | OUTPATIENT
Start: 2022-10-22

## 2022-10-21 RX ORDER — DIPHENHYDRAMINE HYDROCHLORIDE 50 MG/ML
50 INJECTION INTRAMUSCULAR; INTRAVENOUS
Status: CANCELLED
Start: 2022-10-22

## 2022-10-21 RX ADMIN — ERTAPENEM SODIUM 1 G: 1 INJECTION, POWDER, LYOPHILIZED, FOR SOLUTION INTRAMUSCULAR; INTRAVENOUS at 14:04

## 2022-10-21 ASSESSMENT — PAIN SCALES - GENERAL: PAINLEVEL: NO PAIN (0)

## 2022-10-21 NOTE — PROGRESS NOTES
Infusion Nursing Note:  Leonor Bernstein presents today for Invanz.    Patient seen by provider today: No   present during visit today: Not Applicable.    Note: N/A.    Intravenous Access:  Midline.    Treatment Conditions:  Not Applicable.    Post Infusion Assessment:  Patient tolerated infusion without incident.     Discharge Plan:   Discharge instructions reviewed with: Patient.  Patient and/or family verbalized understanding of discharge instructions and all questions answered.  Patient discharged in stable condition accompanied by: self.  Departure Mode: Ambulatory.      Chandni Tobar RN

## 2022-10-22 ENCOUNTER — INFUSION THERAPY VISIT (OUTPATIENT)
Dept: INFUSION THERAPY | Facility: CLINIC | Age: 72
End: 2022-10-22
Attending: HOSPITALIST
Payer: COMMERCIAL

## 2022-10-22 VITALS
RESPIRATION RATE: 16 BRPM | HEART RATE: 80 BPM | DIASTOLIC BLOOD PRESSURE: 72 MMHG | SYSTOLIC BLOOD PRESSURE: 142 MMHG | TEMPERATURE: 97.8 F

## 2022-10-22 DIAGNOSIS — N39.0 URINARY TRACT INFECTION IN FEMALE: Primary | ICD-10-CM

## 2022-10-22 PROCEDURE — 250N000009 HC RX 250: Performed by: HOSPITALIST

## 2022-10-22 PROCEDURE — 250N000011 HC RX IP 250 OP 636: Performed by: HOSPITALIST

## 2022-10-22 PROCEDURE — 96374 THER/PROPH/DIAG INJ IV PUSH: CPT

## 2022-10-22 RX ORDER — ALBUTEROL SULFATE 0.83 MG/ML
2.5 SOLUTION RESPIRATORY (INHALATION)
Status: CANCELLED | OUTPATIENT
Start: 2022-10-23

## 2022-10-22 RX ORDER — ALBUTEROL SULFATE 90 UG/1
1-2 AEROSOL, METERED RESPIRATORY (INHALATION)
Status: CANCELLED
Start: 2022-10-23

## 2022-10-22 RX ORDER — EPINEPHRINE 1 MG/ML
0.3 INJECTION, SOLUTION INTRAMUSCULAR; SUBCUTANEOUS EVERY 5 MIN PRN
Status: CANCELLED | OUTPATIENT
Start: 2022-10-23

## 2022-10-22 RX ORDER — MEPERIDINE HYDROCHLORIDE 25 MG/ML
25 INJECTION INTRAMUSCULAR; INTRAVENOUS; SUBCUTANEOUS EVERY 30 MIN PRN
Status: CANCELLED | OUTPATIENT
Start: 2022-10-23

## 2022-10-22 RX ORDER — DIPHENHYDRAMINE HYDROCHLORIDE 50 MG/ML
50 INJECTION INTRAMUSCULAR; INTRAVENOUS
Status: CANCELLED
Start: 2022-10-23

## 2022-10-22 RX ORDER — METHYLPREDNISOLONE SODIUM SUCCINATE 125 MG/2ML
125 INJECTION, POWDER, LYOPHILIZED, FOR SOLUTION INTRAMUSCULAR; INTRAVENOUS
Status: CANCELLED
Start: 2022-10-23

## 2022-10-22 RX ADMIN — ERTAPENEM SODIUM 1 G: 1 INJECTION, POWDER, LYOPHILIZED, FOR SOLUTION INTRAMUSCULAR; INTRAVENOUS at 09:40

## 2022-10-22 ASSESSMENT — PAIN SCALES - GENERAL: PAINLEVEL: NO PAIN (0)

## 2022-10-22 NOTE — PROGRESS NOTES
Infusion Nursing Note:  Leonor Bernstein presents today for IV invanz, last dose.    Patient seen by provider today: No   present during visit today: Not Applicable.    Note: final dose today, pull midline at completion.    Intravenous Access:  Midline.    Treatment Conditions:  Not Applicable.    Post Infusion Assessment:  Patient tolerated infusion without incident.  Blood return noted pre and post infusion.  Site patent and intact, free from redness, edema or discomfort.  No evidence of extravasations.  Access discontinued per protocol.     Discharge Plan:   Patient declined prescription refills.  Discharge instructions reviewed with: Patient.  Patient and/or family verbalized understanding of discharge instructions and all questions answered.  AVS to patient via [employer].  Patient will return PRN for next appointment.   Patient discharged in stable condition accompanied by: self.  Departure Mode: Ambulatory.      Maria R Cote RN

## 2022-10-23 ENCOUNTER — HEALTH MAINTENANCE LETTER (OUTPATIENT)
Age: 72
End: 2022-10-23

## 2022-10-27 RX ORDER — SYRINGE WITH NEEDLE, 1 ML 25GX5/8"
SYRINGE, EMPTY DISPOSABLE MISCELLANEOUS
COMMUNITY
Start: 2022-09-23

## 2022-10-27 RX ORDER — CLINDAMYCIN HCL 300 MG
2 CAPSULE ORAL
COMMUNITY
Start: 2022-06-01

## 2022-11-01 ENCOUNTER — ANESTHESIA (OUTPATIENT)
Dept: SURGERY | Facility: CLINIC | Age: 72
End: 2022-11-01
Payer: COMMERCIAL

## 2022-11-01 ENCOUNTER — APPOINTMENT (OUTPATIENT)
Dept: GENERAL RADIOLOGY | Facility: CLINIC | Age: 72
End: 2022-11-01
Attending: STUDENT IN AN ORGANIZED HEALTH CARE EDUCATION/TRAINING PROGRAM
Payer: COMMERCIAL

## 2022-11-01 ENCOUNTER — HOSPITAL ENCOUNTER (OUTPATIENT)
Facility: CLINIC | Age: 72
Discharge: HOME | End: 2022-11-01
Attending: STUDENT IN AN ORGANIZED HEALTH CARE EDUCATION/TRAINING PROGRAM | Admitting: STUDENT IN AN ORGANIZED HEALTH CARE EDUCATION/TRAINING PROGRAM
Payer: COMMERCIAL

## 2022-11-01 ENCOUNTER — ANESTHESIA EVENT (OUTPATIENT)
Dept: SURGERY | Facility: CLINIC | Age: 72
End: 2022-11-01
Payer: COMMERCIAL

## 2022-11-01 VITALS
RESPIRATION RATE: 15 BRPM | SYSTOLIC BLOOD PRESSURE: 97 MMHG | HEIGHT: 64 IN | HEART RATE: 82 BPM | WEIGHT: 186.2 LBS | OXYGEN SATURATION: 96 % | BODY MASS INDEX: 31.79 KG/M2 | TEMPERATURE: 97 F | DIASTOLIC BLOOD PRESSURE: 53 MMHG

## 2022-11-01 DIAGNOSIS — N20.0 RIGHT KIDNEY STONE: ICD-10-CM

## 2022-11-01 DIAGNOSIS — N20.1 RIGHT URETERAL STONE: Primary | ICD-10-CM

## 2022-11-01 PROCEDURE — 255N000002 HC RX 255 OP 636: Performed by: STUDENT IN AN ORGANIZED HEALTH CARE EDUCATION/TRAINING PROGRAM

## 2022-11-01 PROCEDURE — 258N000003 HC RX IP 258 OP 636: Performed by: STUDENT IN AN ORGANIZED HEALTH CARE EDUCATION/TRAINING PROGRAM

## 2022-11-01 PROCEDURE — 272N000002 HC OR SUPPLY OTHER OPNP: Performed by: STUDENT IN AN ORGANIZED HEALTH CARE EDUCATION/TRAINING PROGRAM

## 2022-11-01 PROCEDURE — 258N000003 HC RX IP 258 OP 636: Performed by: NURSE ANESTHETIST, CERTIFIED REGISTERED

## 2022-11-01 PROCEDURE — 710N000009 HC RECOVERY PHASE 1, LEVEL 1, PER MIN: Performed by: STUDENT IN AN ORGANIZED HEALTH CARE EDUCATION/TRAINING PROGRAM

## 2022-11-01 PROCEDURE — 370N000017 HC ANESTHESIA TECHNICAL FEE, PER MIN: Performed by: STUDENT IN AN ORGANIZED HEALTH CARE EDUCATION/TRAINING PROGRAM

## 2022-11-01 PROCEDURE — C1769 GUIDE WIRE: HCPCS | Performed by: STUDENT IN AN ORGANIZED HEALTH CARE EDUCATION/TRAINING PROGRAM

## 2022-11-01 PROCEDURE — 250N000011 HC RX IP 250 OP 636: Performed by: ANESTHESIOLOGY

## 2022-11-01 PROCEDURE — C2617 STENT, NON-COR, TEM W/O DEL: HCPCS | Performed by: STUDENT IN AN ORGANIZED HEALTH CARE EDUCATION/TRAINING PROGRAM

## 2022-11-01 PROCEDURE — 250N000009 HC RX 250: Performed by: NURSE ANESTHETIST, CERTIFIED REGISTERED

## 2022-11-01 PROCEDURE — 74420 UROGRAPHY RTRGR +-KUB: CPT | Mod: 26 | Performed by: STUDENT IN AN ORGANIZED HEALTH CARE EDUCATION/TRAINING PROGRAM

## 2022-11-01 PROCEDURE — 360N000084 HC SURGERY LEVEL 4 W/ FLUORO, PER MIN: Performed by: STUDENT IN AN ORGANIZED HEALTH CARE EDUCATION/TRAINING PROGRAM

## 2022-11-01 PROCEDURE — 82365 CALCULUS SPECTROSCOPY: CPT | Performed by: STUDENT IN AN ORGANIZED HEALTH CARE EDUCATION/TRAINING PROGRAM

## 2022-11-01 PROCEDURE — 999N000179 XR SURGERY CARM FLUORO LESS THAN 5 MIN W STILLS: Mod: TC

## 2022-11-01 PROCEDURE — 999N000141 HC STATISTIC PRE-PROCEDURE NURSING ASSESSMENT: Performed by: STUDENT IN AN ORGANIZED HEALTH CARE EDUCATION/TRAINING PROGRAM

## 2022-11-01 PROCEDURE — 250N000009 HC RX 250: Performed by: ANESTHESIOLOGY

## 2022-11-01 PROCEDURE — 258N000001 HC RX 258: Performed by: STUDENT IN AN ORGANIZED HEALTH CARE EDUCATION/TRAINING PROGRAM

## 2022-11-01 PROCEDURE — 250N000013 HC RX MED GY IP 250 OP 250 PS 637: Performed by: ANESTHESIOLOGY

## 2022-11-01 PROCEDURE — 52356 CYSTO/URETERO W/LITHOTRIPSY: CPT | Mod: 22 | Performed by: STUDENT IN AN ORGANIZED HEALTH CARE EDUCATION/TRAINING PROGRAM

## 2022-11-01 PROCEDURE — 250N000011 HC RX IP 250 OP 636: Performed by: STUDENT IN AN ORGANIZED HEALTH CARE EDUCATION/TRAINING PROGRAM

## 2022-11-01 PROCEDURE — 250N000009 HC RX 250: Performed by: STUDENT IN AN ORGANIZED HEALTH CARE EDUCATION/TRAINING PROGRAM

## 2022-11-01 PROCEDURE — 272N000001 HC OR GENERAL SUPPLY STERILE: Performed by: STUDENT IN AN ORGANIZED HEALTH CARE EDUCATION/TRAINING PROGRAM

## 2022-11-01 PROCEDURE — 250N000011 HC RX IP 250 OP 636: Performed by: NURSE ANESTHETIST, CERTIFIED REGISTERED

## 2022-11-01 PROCEDURE — 258N000003 HC RX IP 258 OP 636: Performed by: ANESTHESIOLOGY

## 2022-11-01 PROCEDURE — 710N000012 HC RECOVERY PHASE 2, PER MINUTE: Performed by: STUDENT IN AN ORGANIZED HEALTH CARE EDUCATION/TRAINING PROGRAM

## 2022-11-01 DEVICE — STENT URETERAL POLARIS ULTRA 6FRX26CM M0061921330: Type: IMPLANTABLE DEVICE | Site: URETHRA | Status: FUNCTIONAL

## 2022-11-01 RX ORDER — FENTANYL CITRATE 50 UG/ML
50 INJECTION, SOLUTION INTRAMUSCULAR; INTRAVENOUS EVERY 5 MIN PRN
Status: DISCONTINUED | OUTPATIENT
Start: 2022-11-01 | End: 2022-11-01 | Stop reason: HOSPADM

## 2022-11-01 RX ORDER — HYDROMORPHONE HYDROCHLORIDE 1 MG/ML
0.5 INJECTION, SOLUTION INTRAMUSCULAR; INTRAVENOUS; SUBCUTANEOUS EVERY 5 MIN PRN
Status: DISCONTINUED | OUTPATIENT
Start: 2022-11-01 | End: 2022-11-01 | Stop reason: HOSPADM

## 2022-11-01 RX ORDER — OXYCODONE HYDROCHLORIDE 5 MG/1
5 TABLET ORAL EVERY 6 HOURS PRN
Qty: 10 TABLET | Refills: 0 | Status: SHIPPED | OUTPATIENT
Start: 2022-11-01 | End: 2022-11-04

## 2022-11-01 RX ORDER — OXYCODONE HYDROCHLORIDE 5 MG/1
5 TABLET ORAL EVERY 4 HOURS PRN
Status: DISCONTINUED | OUTPATIENT
Start: 2022-11-01 | End: 2022-11-01 | Stop reason: HOSPADM

## 2022-11-01 RX ORDER — FENTANYL CITRATE 50 UG/ML
INJECTION, SOLUTION INTRAMUSCULAR; INTRAVENOUS PRN
Status: DISCONTINUED | OUTPATIENT
Start: 2022-11-01 | End: 2022-11-01

## 2022-11-01 RX ORDER — DEXAMETHASONE SODIUM PHOSPHATE 4 MG/ML
INJECTION, SOLUTION INTRA-ARTICULAR; INTRALESIONAL; INTRAMUSCULAR; INTRAVENOUS; SOFT TISSUE PRN
Status: DISCONTINUED | OUTPATIENT
Start: 2022-11-01 | End: 2022-11-01

## 2022-11-01 RX ORDER — LIDOCAINE 40 MG/G
CREAM TOPICAL
Status: DISCONTINUED | OUTPATIENT
Start: 2022-11-01 | End: 2022-11-01 | Stop reason: HOSPADM

## 2022-11-01 RX ORDER — ONDANSETRON 2 MG/ML
INJECTION INTRAMUSCULAR; INTRAVENOUS PRN
Status: DISCONTINUED | OUTPATIENT
Start: 2022-11-01 | End: 2022-11-01

## 2022-11-01 RX ORDER — SCOLOPAMINE TRANSDERMAL SYSTEM 1 MG/1
1 PATCH, EXTENDED RELEASE TRANSDERMAL
Status: DISCONTINUED | OUTPATIENT
Start: 2022-11-01 | End: 2022-11-01 | Stop reason: HOSPADM

## 2022-11-01 RX ORDER — MEPERIDINE HYDROCHLORIDE 25 MG/ML
12.5 INJECTION INTRAMUSCULAR; INTRAVENOUS; SUBCUTANEOUS
Status: DISCONTINUED | OUTPATIENT
Start: 2022-11-01 | End: 2022-11-01 | Stop reason: HOSPADM

## 2022-11-01 RX ORDER — ALBUTEROL SULFATE 0.83 MG/ML
2.5 SOLUTION RESPIRATORY (INHALATION) EVERY 4 HOURS PRN
Status: DISCONTINUED | OUTPATIENT
Start: 2022-11-01 | End: 2022-11-01 | Stop reason: HOSPADM

## 2022-11-01 RX ORDER — ACETAMINOPHEN 500 MG
1000 TABLET ORAL EVERY 6 HOURS PRN
Qty: 100 TABLET | Refills: 0 | Status: SHIPPED | OUTPATIENT
Start: 2022-11-01

## 2022-11-01 RX ORDER — KETOROLAC TROMETHAMINE 15 MG/ML
15 INJECTION, SOLUTION INTRAMUSCULAR; INTRAVENOUS ONCE
Status: COMPLETED | OUTPATIENT
Start: 2022-11-01 | End: 2022-11-01

## 2022-11-01 RX ORDER — SODIUM CHLORIDE, SODIUM LACTATE, POTASSIUM CHLORIDE, CALCIUM CHLORIDE 600; 310; 30; 20 MG/100ML; MG/100ML; MG/100ML; MG/100ML
INJECTION, SOLUTION INTRAVENOUS CONTINUOUS
Status: DISCONTINUED | OUTPATIENT
Start: 2022-11-01 | End: 2022-11-01 | Stop reason: HOSPADM

## 2022-11-01 RX ORDER — FENTANYL CITRATE 50 UG/ML
25 INJECTION, SOLUTION INTRAMUSCULAR; INTRAVENOUS
Status: DISCONTINUED | OUTPATIENT
Start: 2022-11-01 | End: 2022-11-01 | Stop reason: HOSPADM

## 2022-11-01 RX ORDER — PROPOFOL 10 MG/ML
INJECTION, EMULSION INTRAVENOUS CONTINUOUS PRN
Status: DISCONTINUED | OUTPATIENT
Start: 2022-11-01 | End: 2022-11-01

## 2022-11-01 RX ORDER — ONDANSETRON 2 MG/ML
4 INJECTION INTRAMUSCULAR; INTRAVENOUS EVERY 30 MIN PRN
Status: DISCONTINUED | OUTPATIENT
Start: 2022-11-01 | End: 2022-11-01 | Stop reason: HOSPADM

## 2022-11-01 RX ORDER — ONDANSETRON 4 MG/1
4 TABLET, ORALLY DISINTEGRATING ORAL EVERY 30 MIN PRN
Status: DISCONTINUED | OUTPATIENT
Start: 2022-11-01 | End: 2022-11-01 | Stop reason: HOSPADM

## 2022-11-01 RX ORDER — PROPOFOL 10 MG/ML
INJECTION, EMULSION INTRAVENOUS PRN
Status: DISCONTINUED | OUTPATIENT
Start: 2022-11-01 | End: 2022-11-01

## 2022-11-01 RX ORDER — LIDOCAINE HYDROCHLORIDE 10 MG/ML
INJECTION, SOLUTION EPIDURAL; INFILTRATION; INTRACAUDAL; PERINEURAL PRN
Status: DISCONTINUED | OUTPATIENT
Start: 2022-11-01 | End: 2022-11-01

## 2022-11-01 RX ORDER — CLINDAMYCIN PHOSPHATE 900 MG/50ML
900 INJECTION, SOLUTION INTRAVENOUS SEE ADMIN INSTRUCTIONS
Status: DISCONTINUED | OUTPATIENT
Start: 2022-11-01 | End: 2022-11-01 | Stop reason: HOSPADM

## 2022-11-01 RX ORDER — ACETAMINOPHEN 325 MG/1
975 TABLET ORAL ONCE
Status: COMPLETED | OUTPATIENT
Start: 2022-11-01 | End: 2022-11-01

## 2022-11-01 RX ORDER — TAMSULOSIN HYDROCHLORIDE 0.4 MG/1
0.4 CAPSULE ORAL DAILY
Qty: 14 CAPSULE | Refills: 0 | Status: SHIPPED | OUTPATIENT
Start: 2022-11-01 | End: 2023-05-19

## 2022-11-01 RX ORDER — CIPROFLOXACIN 500 MG/1
500 TABLET, FILM COATED ORAL ONCE
Qty: 1 TABLET | Refills: 0 | Status: SHIPPED | OUTPATIENT
Start: 2022-11-01 | End: 2022-11-01

## 2022-11-01 RX ORDER — LORAZEPAM 2 MG/ML
.5-1 INJECTION INTRAMUSCULAR
Status: DISCONTINUED | OUTPATIENT
Start: 2022-11-01 | End: 2022-11-01 | Stop reason: HOSPADM

## 2022-11-01 RX ORDER — HYDRALAZINE HYDROCHLORIDE 20 MG/ML
5-10 INJECTION INTRAMUSCULAR; INTRAVENOUS EVERY 10 MIN PRN
Status: DISCONTINUED | OUTPATIENT
Start: 2022-11-01 | End: 2022-11-01 | Stop reason: HOSPADM

## 2022-11-01 RX ORDER — CLINDAMYCIN PHOSPHATE 900 MG/50ML
900 INJECTION, SOLUTION INTRAVENOUS
Status: COMPLETED | OUTPATIENT
Start: 2022-11-01 | End: 2022-11-01

## 2022-11-01 RX ADMIN — KETOROLAC TROMETHAMINE 15 MG: 15 INJECTION, SOLUTION INTRAMUSCULAR; INTRAVENOUS at 16:11

## 2022-11-01 RX ADMIN — CLINDAMYCIN PHOSPHATE 900 MG: 900 INJECTION, SOLUTION INTRAVENOUS at 12:59

## 2022-11-01 RX ADMIN — SCOPALAMINE 1 PATCH: 1 PATCH, EXTENDED RELEASE TRANSDERMAL at 13:28

## 2022-11-01 RX ADMIN — ONDANSETRON HYDROCHLORIDE 4 MG: 2 INJECTION, SOLUTION INTRAVENOUS at 15:00

## 2022-11-01 RX ADMIN — SODIUM CHLORIDE, POTASSIUM CHLORIDE, SODIUM LACTATE AND CALCIUM CHLORIDE: 600; 310; 30; 20 INJECTION, SOLUTION INTRAVENOUS at 12:03

## 2022-11-01 RX ADMIN — LIDOCAINE HYDROCHLORIDE 50 MG: 10 INJECTION, SOLUTION EPIDURAL; INFILTRATION; INTRACAUDAL; PERINEURAL at 13:38

## 2022-11-01 RX ADMIN — ACETAMINOPHEN 975 MG: 325 TABLET, FILM COATED ORAL at 16:10

## 2022-11-01 RX ADMIN — PROPOFOL 100 MCG/KG/MIN: 10 INJECTION, EMULSION INTRAVENOUS at 13:39

## 2022-11-01 RX ADMIN — PHENYLEPHRINE HYDROCHLORIDE 100 MCG: 10 INJECTION INTRAVENOUS at 13:45

## 2022-11-01 RX ADMIN — PROPOFOL 150 MG: 10 INJECTION, EMULSION INTRAVENOUS at 13:38

## 2022-11-01 RX ADMIN — FENTANYL CITRATE 100 MCG: 50 INJECTION, SOLUTION INTRAMUSCULAR; INTRAVENOUS at 13:38

## 2022-11-01 RX ADMIN — GENTAMICIN SULFATE 420 MG: 40 INJECTION, SOLUTION INTRAMUSCULAR; INTRAVENOUS at 13:13

## 2022-11-01 RX ADMIN — PHENYLEPHRINE HYDROCHLORIDE 50 MCG: 10 INJECTION INTRAVENOUS at 13:50

## 2022-11-01 RX ADMIN — FENTANYL CITRATE 50 MCG: 50 INJECTION, SOLUTION INTRAMUSCULAR; INTRAVENOUS at 14:23

## 2022-11-01 RX ADMIN — ONDANSETRON 4 MG: 2 INJECTION INTRAMUSCULAR; INTRAVENOUS at 17:21

## 2022-11-01 RX ADMIN — DEXAMETHASONE SODIUM PHOSPHATE 8 MG: 4 INJECTION, SOLUTION INTRA-ARTICULAR; INTRALESIONAL; INTRAMUSCULAR; INTRAVENOUS; SOFT TISSUE at 13:38

## 2022-11-01 ASSESSMENT — ACTIVITIES OF DAILY LIVING (ADL)
ADLS_ACUITY_SCORE: 37

## 2022-11-01 NOTE — ANESTHESIA POSTPROCEDURE EVALUATION
Patient: Leonor Bernstein    Procedure: Procedure(s):  Cystoscopy, right flexible ureteroscopy with thulium laser lithotripsy, right ureteroscopy with stone basketing, right retrograde pyelogram, right ureteral stent exchange       Anesthesia Type:  General    Note:  Disposition: Outpatient   Postop Pain Control: Uneventful            Sign Out: Well controlled pain   PONV: No   Neuro/Psych: Uneventful            Sign Out: Acceptable/Baseline neuro status   Airway/Respiratory: Uneventful            Sign Out: Acceptable/Baseline resp. status   CV/Hemodynamics: Uneventful            Sign Out: Acceptable CV status   Other NRE: NONE   DID A NON-ROUTINE EVENT OCCUR? No           Last vitals:  Vitals Value Taken Time   /75 11/01/22 1615   Temp 99  F (37.2  C) 11/01/22 1615   Pulse 82 11/01/22 1615   Resp 26 11/01/22 1615   SpO2 93 % 11/01/22 1615   Vitals shown include unvalidated device data.    Electronically Signed By: Ihsan Trevino MD  November 1, 2022  6:10 PM

## 2022-11-01 NOTE — ANESTHESIA CARE TRANSFER NOTE
Patient: Leonor Bernstein    Procedure: Procedure(s):  Cystoscopy, right flexible ureteroscopy with thulium laser lithotripsy, right ureteroscopy with stone basketing, right retrograde pyelogram, right ureteral stent exchange       Diagnosis: Right ureteral stone [N20.1]  Right kidney stone [N20.0]  Diagnosis Additional Information: No value filed.    Anesthesia Type:   General     Note:    Oropharynx: oropharynx clear of all foreign objects  Level of Consciousness: awake  Oxygen Supplementation: face mask    Independent Airway: airway patency satisfactory and stable  Dentition: dentition unchanged  Vital Signs Stable: post-procedure vital signs reviewed and stable  Report to RN Given: handoff report given  Patient transferred to: PACU  Comments: VSS. Report to RN.  Handoff Report: Identifed the Patient, Identified the Reponsible Provider, Reviewed the pertinent medical history, Discussed the surgical course, Reviewed Intra-OP anesthesia mangement and issues during anesthesia, Set expectations for post-procedure period and Allowed opportunity for questions and acknowledgement of understanding      Vitals:  Vitals Value Taken Time   /84 11/01/22 1536   Temp     Pulse 69 11/01/22 1538   Resp 23 11/01/22 1538   SpO2 99 % 11/01/22 1538   Vitals shown include unvalidated device data.    Electronically Signed By: JACQUE Rodriguez CRNA  November 1, 2022  3:39 PM
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
## 2022-11-01 NOTE — ANESTHESIA PROCEDURE NOTES
Airway       Patient location during procedure: OR       Procedure Start/Stop Times: 11/1/2022 1:38 PM  Staff -        CRNA: Ashvin Lin APRN CRNA       Performed By: CRNA  Consent for Airway        Urgency: elective  Indications and Patient Condition       Indications for airway management: anupama-procedural       Induction type:intravenous       Mask difficulty assessment: 1 - vent by mask    Final Airway Details       Final airway type: supraglottic airway    Supraglottic Airway Details        Type: LMA       Brand: I-Gel       LMA size: 4    Post intubation assessment        Placement verified by: capnometry, equal breath sounds and chest rise        Number of attempts at approach: 1       Number of other approaches attempted: 0       Secured with: commercial tube barboza       Ease of procedure: easy       Dentition: Intact and Unchanged    Medication(s) Administered   Medication Administration Time: 11/1/2022 1:38 PM

## 2022-11-01 NOTE — DISCHARGE INSTRUCTIONS
POSTOPERATIVE INSTRUCTIONS    Diagnosis-------------------------------   Right kidney stone  Right ureteral injury    Procedure-------------------------------  Procedure(s) (LRB):  Cystoscopy, right flexible ureteroscopy with thulium laser lithotripsy, right ureteroscopy with stone basketing, right retrograde pyelogram, right ureteral stent exchange (Right)      Findings--------------------------------    No right ureteral stones. Small right renal pelvis stones 2-3 mm, multiple right lower pole stones blocking two lower pole calyces (13 mm, 8 mm, 5 mm), total linear stone burden >2.5 cm, estimate 90% stone free.   Traxer Grade 1 ureteral injury about 25% of circumference of the ureter, about 2 cm long, in the proximal ureter       Home-going instructions-----------------         Activity Limitation:     - No driving or operating heavy machinery while on narcotic pain medication.     FOLLOW THESE INSTRUCTIONS AS INDICATED BELOW:  - Observe operative area for signs of excessive bleeding.  - You may shower.  - Increase fluid intake to promote clear urine.  - Resume usual diet as tolerated    What to expect while recovering-----------  - You may experience some intermittent bleeding that makes your urine pink or cherry colored. This is normal.  - However, if you are unable to urinate, passing large amount of clots, have jelena blood in your urine, or have a temperature >101 degrees, call the urology nurse on call, or present to your nearest emergency department.  - You are encouraged to walk daily, and have no activity restrictions.   - A URETERAL STENT has been placed that allows urine to flow unobstructed from your kidney into your bladder.  The stent has a curl in the kidney and a curl in the bladder.  The curl in the bladder can cause some urgency and frequency of urination as well as some mild blood in the urine.  The curl in the kidney can cause some mild flank discomfort.  This may be more noticeable when you  urinate.  A URETERAL STENT is meant to be left in temporarily.  It must be removed or changed no later than 3 months after its insertion.  If it's not removed it can result in stone overgrowth on the stent that can cause pain, infection, and can be very difficult to remove.      Discharge Medications/instructions:   - Flomax (tamsulosin) to be taken daily until stent is removed  - Antibiotics (ciprofloxacin) are to be taken with you to your scheduled return visit for stent removal  - Take Tylenol 1000mg every 6 hours for pain  - Take Oxycodone 5mg every 6 hours only for break through pain  - Take Colace while taking Oxycodone to prevent constipation      Questions/concerns------------------------  [facility]: [phone]    Future appointments  Follow up 11/14/2022 for cystoscopy and stent removal in the office      Fei Fuller MD     GENERAL ANESTHESIA OR SEDATION ADULT DISCHARGE INSTRUCTIONS   SPECIAL PRECAUTIONS FOR 24 HOURS AFTER SURGERY    IT IS NOT UNUSUAL TO FEEL LIGHT-HEADED OR FAINT, UP TO 24 HOURS AFTER SURGERY OR WHILE TAKING PAIN MEDICATION.  IF YOU HAVE THESE SYMPTOMS; SIT FOR A FEW MINUTES BEFORE STANDING AND HAVE SOMEONE ASSIST YOU WHEN YOU GET UP TO WALK OR USE THE BATHROOM.    YOU SHOULD REST AND RELAX FOR THE NEXT 24 HOURS AND YOU MUST MAKE ARRANGEMENTS TO HAVE SOMEONE STAY WITH YOU FOR AT LEAST 24 HOURS AFTER YOUR DISCHARGE.  AVOID HAZARDOUS AND STRENUOUS ACTIVITIES.  DO NOT MAKE IMPORTANT DECISIONS FOR 24 HOURS.    DO NOT DRIVE ANY VEHICLE OR OPERATE MECHANICAL EQUIPMENT FOR 24 HOURS FOLLOWING THE END OF YOUR SURGERY.  EVEN THOUGH YOU MAY FEEL NORMAL, YOUR REACTIONS MAY BE AFFECTED BY THE MEDICATION YOU HAVE RECEIVED.    DO NOT DRINK ALCOHOLIC BEVERAGES FOR 24 HOURS FOLLOWING YOUR SURGERY.    DRINK CLEAR LIQUIDS (APPLE JUICE, GINGER ALE, 7-UP, BROTH, ETC.).  PROGRESS TO YOUR REGULAR DIET AS YOU FEEL ABLE.    YOU MAY HAVE A DRY MOUTH, A SORE THROAT, MUSCLES ACHES  OR TROUBLE SLEEPING.  THESE SHOULD GO AWAY AFTER 24 HOURS.    CALL YOUR DOCTOR FOR ANY OF THE FOLLOWING:  SIGNS OF INFECTION (FEVER, GROWING TENDERNESS AT THE SURGERY SITE, A LARGE AMOUNT OF DRAINAGE OR BLEEDING, SEVERE PAIN, FOUL-SMELLING DRAINAGE, REDNESS OR SWELLING.    IT HAS BEEN OVER 8 TO 10 HOURS SINCE SURGERY AND YOU ARE STILL NOT ABLE TO URINATE (PASS WATER).      You received Toradol, an IV form of Ibuprofen (Motrin) at 4:15pm.  Do not take any Ibuprofen products until 10:15pm.     Maximum acetaminophen (Tylenol) dose from all sources should not exceed 4 grams (4000 mg) per day. You last had 975mg at 4:15pm.

## 2022-11-01 NOTE — OP NOTE
[facility]  Operative Note    Pre-operative diagnosis: Right ureteral stone [N20.1]  Right kidney stone [N20.0]   Post-operative diagnosis Right kidney stone  Right ureteral injury   Procedure: Procedure(s):  Cystoscopy  right ureteroscopy with thulium laser lithotripsy and stone basketing complex >2.5 cm stone burden  right retrograde pyelogram  right ureteral stent exchange  Fluoroscopic interpretation <1 hour physician time   Surgeon: Fei Fuller MD   Assistants(s): none   Anesthesia: General    Estimated blood loss: Minimal    Total IV fluids: (See anesthesia record)   Blood transfusion: No transfusion was given during surgery   Total urine output: Not measured   Drains: Right ureteral stent   Specimens: ID Type Source Tests Collected by Time Destination   A : Right kidney stone Calculus/Stone Kidney, Right STONE ANALYSIS Fei Fuller MD 11/1/2022  3:22 PM         Implants: Implant Name Type Inv. Item Serial No.  Lot No. LRB No. Used Action   STENT URETERAL POLARIS ULTRA [id number] - [id number] Stent STENT URETERAL POLARIS ULTRA [id number]  [employer] [id number] Right 1 Explanted   STENT URETERAL POLARIS ULTRA [id number] - [id number] Stent STENT URETERAL POLARIS ULTRA [id number]  [employer] [id number] Right 1 Implanted          Findings:   No right ureteral stones. Small right renal pelvis stones 2-3 mm, multiple right lower pole stones blocking two lower pole calyces (13 mm, 8 mm, 5 mm), total linear stone burden >2.5 cm, estimate 90% stone free.   Complications: Traxer Grade 1 ureteral injury about 25% of circumference of the ureter, about 2 cm long, in the proximal ureter   Condition: Stable             Description of procedure:  71 yo F with infected obstructed right proximal ureteral stones and right lower pole nonobstructive stones s/p cystoscopy and right ureteral stent placement (pan sens E. Coli)  treated with course of ertapenem who presents today for ureteroscopic management of the stone burden. Risks including need for secondary procedure, incomplete stone clearance, pain, hematuria, ureteral injury, infection discussed. Informed consent obtained    Patient was brought operating room #14.  Clindamycin and gentamicin antibiotics were given prior to the procedure.  General anesthesia was induced, she was placed in dorsolithotomy position, prepped and draped in standard sterile fashion.  Timeout was performed.    A 22 [place] Storz cystoscope was assembled and passed through the urethra into the bladder.  The existing ureteral stent was noted emanating from the right ureteral orifice with no encrustation.  The stent was grasped with a stent grasper and brought out the urethral meatus.  The stent was cannulated with a 0.035 inch stiff shaft Glidewire which was attempted to be passed up the stent up to the renal pelvis however the proximal curl of the stent would not uncurl, presumably due to obstruction due to encrustation.  Therefore I decided to leave the wire within the stent and then passed the cystoscope back into the bladder.  A second wire was then passed alongside the stent up to the renal pelvis under fluoroscopic guidance and this wire was clipped to the drapes as a safety wire.  The stent with wire inside was then removed intact under fluoroscopic guidance and discarded. The proximal curl of the stent did straighten out when the stent was pulled. Notably there was no significant encrustation on the outside of the curl however there must have been encrustation within the stent itself causing blockage.  A Storz short semirigid ureteroscope was assembled and passed through the urethra, into the bladder and up the right ureter.  The ureter was widely patent all the way to the proximal ureter.  I was able to pass the ureteroscope into the renal pelvis.  There were no ureteral stones found.  A gentle  retrograde pyelogram showed mild right hydronephrosis.  A  film prior to the retrograde showed significant stone burden within the right lower pole.  The second wire was then passed directly through the scope up into the renal pelvis and the scope was backloaded off the wire.  Over the working wire a [employer] Navigator 13/15 Cypriot by 36 cm ureteral access sheath was passed up to the proximal ureter under fluoroscopic guidance and the obturator and wire were removed.  A [employer] Lithovue disposable flexible ureteroscope was then passed up the access sheath to the renal pelvis.  Some small 2 to 3 mm renal pelvis stones were found and basketed out with the BIO-NEMS basket.  There was a large stone burden within the lower pole.  Notably there were stones blocking 2 separate lower pole calyces.  The [employer] thulium fiber laser was used to begin to fragment the larger stone in the most dependent lower pole calyx.  This was a large 13 mm stone, behind the stone there was some trapped cloudy urine.  There was an additional 8 mm stone trapped within the calyx behind that first large stone.  Again the [place] Halo basket was used to basket out large fragments. A separate lower pole calyx was blocked with another 5 mm stone.  The laser was used to laser to the stone as well.  Estimate 90% stone clearance. This was a complex procedure requiring considerably more time than a usual ureteroscopy due to stone burden, at least 45 minutes extra laser and basketing time.  Repeat retrograde pyelogram was performed to provide a landing zone for the stent.  Pullout ureteroscopy revealed there to be an area of ureteral mucosal injury in the proximal ureter related to the access sheath, about 25% of the circumference of the ureter and about 2 cm long.  A stent was then placed in the usual fashion under cystoscopic and fluoroscopic guidance with good coils noted proximally and distally.  Bladder  was drained and scope was removed.  Patient was cleaned up, awoken from anesthesia and brought to PACU in stable condition.  She will be discharged home and return for cystoscopy and stent removal in the office in 2 weeks as scheduled.      Fei Fuller MD   [facility]  [phone] clinic phone

## 2022-11-03 LAB
APPEARANCE STONE: NORMAL
COMPN STONE: NORMAL
SPECIMEN WT: 293 MG

## 2022-11-11 ENCOUNTER — TELEPHONE (OUTPATIENT)
Dept: UROLOGY | Facility: CLINIC | Age: 72
End: 2022-11-11

## 2022-11-11 NOTE — TELEPHONE ENCOUNTER
[facility] Call Center    Phone Message    May a detailed message be left on voicemail: yes     Reason for Call: Other: .  Pt calling regarding stent removal on Monday with Alina. Pt is wondering will shebe able to drive to and from appt or does she need a . Please call pt    Action Taken: Message routed to:  Other: Uro    Travel Screening: Not Applicable

## 2022-11-14 ENCOUNTER — OFFICE VISIT (OUTPATIENT)
Dept: UROLOGY | Facility: CLINIC | Age: 72
End: 2022-11-14
Payer: COMMERCIAL

## 2022-11-14 VITALS
HEART RATE: 75 BPM | DIASTOLIC BLOOD PRESSURE: 90 MMHG | HEIGHT: 65 IN | BODY MASS INDEX: 29.99 KG/M2 | OXYGEN SATURATION: 97 % | SYSTOLIC BLOOD PRESSURE: 138 MMHG | WEIGHT: 180 LBS

## 2022-11-14 DIAGNOSIS — N20.0 RIGHT KIDNEY STONE: ICD-10-CM

## 2022-11-14 DIAGNOSIS — Z79.2 PROPHYLACTIC ANTIBIOTIC: Primary | ICD-10-CM

## 2022-11-14 PROCEDURE — 52310 CYSTOSCOPY AND TREATMENT: CPT | Performed by: STUDENT IN AN ORGANIZED HEALTH CARE EDUCATION/TRAINING PROGRAM

## 2022-11-14 RX ORDER — CIPROFLOXACIN 500 MG/1
500 TABLET, FILM COATED ORAL ONCE
Qty: 1 TABLET | Refills: 0 | Status: SHIPPED | OUTPATIENT
Start: 2022-11-14 | End: 2022-11-14

## 2022-11-14 ASSESSMENT — PAIN SCALES - GENERAL: PAINLEVEL: NO PAIN (0)

## 2022-11-14 NOTE — PROGRESS NOTES
PRE-PROCEDURE DIAGNOSIS: right kidney stone    POST-PROCEDURE DIAGNOSIS: right kidney stone    PROCEDURE: Cystoscopy with right ureteral stent removal    HISTORY: 71 yo F with infected obstructed right proximal ureteral stones and right lower pole nonobstructive stones s/p cystoscopy and right ureteral stent placement 10/11/2022 now URS 11/1/2022    REVIEW OF OFFICE STUDIES (e.g., uroflow or PVR): Calculi composed primarily of:   90% calcium oxalate monohydrate, and   10% calcium oxalate dihydrate.     DESCRIPTION OF PROCEDURE: After informed consent was obtained, the patient was brought to the procedure room where she was placed in the supine position with all pressure points well padded.  The vulva was prepped and draped in sterile fashion. A flexible cystoscope was introduced through a well-lubricated urethra. The stent was visualized, grasped with a flexible grasper and removed intact and discarded    The flexible cystoscope was removed and the findings were described to the patient.     ASSESSMENT AND PLAN: 71 yo F with infected obstructed right proximal ureteral stones and right lower pole nonobstructive stones s/p cystoscopy and right ureteral stent placement 10/11/2022 now URS 11/1/2022    Discussed caox stone prevention diet    - follow up in 6-8 weeks with  RBUS and Litholink x2 prior with PA-C, virtual visit  - follow up with me in 1 year with CT abd/pelvis w/o contrast    Fei Fuller MD   [facility]  [phone] clinic phone

## 2022-11-14 NOTE — NURSING NOTE
Chief Complaint   Patient presents with     ureteral stone     In office stent removal   Prior to the start of the procedure and with procedural staff participation, I verbally confirmed the patient s identity using two indicators, relevant allergies, that the procedure was appropriate and matched the consent or emergent situation, and that the correct equipment/implants were available. Immediately prior to starting the procedure I conducted the Time Out with the procedural staff and re-confirmed the patient s name, procedure, and site/side. I have wiped the patient off with the povidone-Iodine solution, draped them,  , and instilled sterile water into the bladder. (The Joint Commission universal protocol was followed.)  Yes    Sedation (Moderate or Deep): None  Nurys Ziegler LPN

## 2022-11-14 NOTE — LETTER
11/14/2022       RE: Leonor Bernstein  [address]  [place] [address]     Dear Colleague,    Thank you for referring your patient, Leonor Bernstein, to the [facility] [place] [facility]. Please see a copy of my visit note below.    PRE-PROCEDURE DIAGNOSIS: right kidney stone    POST-PROCEDURE DIAGNOSIS: right kidney stone    PROCEDURE: Cystoscopy with right ureteral stent removal    HISTORY: 73 yo F with infected obstructed right proximal ureteral stones and right lower pole nonobstructive stones s/p cystoscopy and right ureteral stent placement 10/11/2022 now URS 11/1/2022    REVIEW OF OFFICE STUDIES (e.g., uroflow or PVR): Calculi composed primarily of:   90% calcium oxalate monohydrate, and   10% calcium oxalate dihydrate.     DESCRIPTION OF PROCEDURE: After informed consent was obtained, the patient was brought to the procedure room where she was placed in the supine position with all pressure points well padded.  The vulva was prepped and draped in sterile fashion. A flexible cystoscope was introduced through a well-lubricated urethra. The stent was visualized, grasped with a flexible grasper and removed intact and discarded    The flexible cystoscope was removed and the findings were described to the patient.     ASSESSMENT AND PLAN: 73 yo F with infected obstructed right proximal ureteral stones and right lower pole nonobstructive stones s/p cystoscopy and right ureteral stent placement 10/11/2022 now URS 11/1/2022    Discussed caox stone prevention diet    - follow up in 6-8 weeks with  RBUS and Litholink x2 prior with PA-C, virtual visit  - follow up with me in 1 year with CT abd/pelvis w/o contrast    Fei Fuller MD   [facility]  [phone] clinic phone

## 2022-11-14 NOTE — PATIENT INSTRUCTIONS

## 2022-11-29 ENCOUNTER — TRANSFERRED RECORDS (OUTPATIENT)
Dept: HEALTH INFORMATION MANAGEMENT | Facility: CLINIC | Age: 72
End: 2022-11-29

## 2022-12-27 ENCOUNTER — HOSPITAL ENCOUNTER (OUTPATIENT)
Dept: ULTRASOUND IMAGING | Facility: CLINIC | Age: 72
Discharge: HOME | End: 2022-12-27
Attending: STUDENT IN AN ORGANIZED HEALTH CARE EDUCATION/TRAINING PROGRAM | Admitting: STUDENT IN AN ORGANIZED HEALTH CARE EDUCATION/TRAINING PROGRAM
Payer: COMMERCIAL

## 2022-12-27 DIAGNOSIS — N20.0 RIGHT KIDNEY STONE: ICD-10-CM

## 2022-12-27 PROCEDURE — 76775 US EXAM ABDO BACK WALL LIM: CPT

## 2023-01-03 ENCOUNTER — VIRTUAL VISIT (OUTPATIENT)
Dept: UROLOGY | Facility: CLINIC | Age: 73
End: 2023-01-03
Payer: COMMERCIAL

## 2023-01-03 VITALS — HEIGHT: 65 IN | WEIGHT: 180 LBS | BODY MASS INDEX: 29.99 KG/M2

## 2023-01-03 DIAGNOSIS — N20.0 RIGHT KIDNEY STONE: Primary | ICD-10-CM

## 2023-01-03 PROCEDURE — 99213 OFFICE O/P EST LOW 20 MIN: CPT | Mod: 95 | Performed by: PHYSICIAN ASSISTANT

## 2023-01-03 ASSESSMENT — PAIN SCALES - GENERAL: PAINLEVEL: NO PAIN (0)

## 2023-01-03 NOTE — LETTER
1/3/2023       RE: Leonor Bernstein  [address]  [place] [address]     Dear Colleague,    Thank you for referring your patient, Leonor Bernstein, to the [facility] at [facility]. Please see a copy of my visit note below.    Pt will meet you in [employer]    Leonor is a 72 year old who is being evaluated via a billable video visit.      How would you like to obtain your AVS? [employer] Vaccineshar[employer]mara  If the video visit is dropped, the invitation should be resent by: Text to cell phone: [phone]  Will anyone else be joining your video visit? No        Video-Visit Details    Type of service:  Video Visit   Video Start Time: 12:07 PM  Video End Time:12:16 PM    Originating Location (pt. Location): Home    Distant Location (provider location):  On-site  Platform used for Video Visit: [place] [facility]  PRE-PROCEDURE DIAGNOSIS: right kidney stone    POST-PROCEDURE DIAGNOSIS: right kidney stone    PROCEDURE: Cystoscopy with right ureteral stent removal    HISTORY: 71 yo F with infected obstructed right proximal ureteral stones and right lower pole nonobstructive stones s/p cystoscopy and right ureteral stent placement 10/11/2022 now URS 11/1/2022. Stent removed 11/14/22.    Drinking water (32oz) cranberry juice (~32oz) and soda (16 oz bottle). Trying to reduce oxalates. No concerns since stent removal.     REVIEW OF OFFICE STUDIES (e.g., uroflow or PVR): Calculi composed primarily of:   90% calcium oxalate monohydrate, and   10% calcium oxalate dihydrate.     Litholink x 2--marked hypo citrate, low urine vol, hyper ox, high pH.    ASSESSMENT AND PLAN: 71 yo F with infected obstructed right proximal ureteral stones and right lower pole nonobstructive stones s/p cystoscopy and right ureteral stent placement 10/11/2022 now URS 11/1/2022, stent removed 11/14/22    - Nephrology referral given multiple abnormalities on Litholinks.   - follow up with   Alina in 1 year with CT abd/pelvis w/o contrast  - call sooner if flank pain, gross hematuria.     Krystal Díaz PA-C  [facility]

## 2023-01-03 NOTE — PROGRESS NOTES
Pt will meet you in [employer]    Leonor is a 72 year old who is being evaluated via a billable video visit.      How would you like to obtain your AVS? BOND  If the video visit is dropped, the invitation should be resent by: Text to cell phone: [phone]  Will anyone else be joining your video visit? No        Video-Visit Details    Type of service:  Video Visit   Video Start Time: 12:07 PM  Video End Time:12:16 PM    Originating Location (pt. Location): Home    Distant Location (provider location):  On-site  Platform used for Video Visit: [employer]  PRE-PROCEDURE DIAGNOSIS: right kidney stone    POST-PROCEDURE DIAGNOSIS: right kidney stone    PROCEDURE: Cystoscopy with right ureteral stent removal    HISTORY: 73 yo F with infected obstructed right proximal ureteral stones and right lower pole nonobstructive stones s/p cystoscopy and right ureteral stent placement 10/11/2022 now URS 11/1/2022. Stent removed 11/14/22.    Drinking water (32oz) cranberry juice (~32oz) and soda (16 oz bottle). Trying to reduce oxalates. No concerns since stent removal.     REVIEW OF OFFICE STUDIES (e.g., uroflow or PVR): Calculi composed primarily of:   90% calcium oxalate monohydrate, and   10% calcium oxalate dihydrate.     Litholink x 2--marked hypo citrate, low urine vol, hyper ox, high pH.    ASSESSMENT AND PLAN: 73 yo F with infected obstructed right proximal ureteral stones and right lower pole nonobstructive stones s/p cystoscopy and right ureteral stent placement 10/11/2022 now URS 11/1/2022, stent removed 11/14/22    - Nephrology referral given multiple abnormalities on Litholinks.   - follow up with Dr. Fuller in 1 year with CT abd/pelvis w/o contrast  - call sooner if flank pain, gross hematuria.     Krystal Díaz PA-C  [facility]

## 2023-01-03 NOTE — PATIENT INSTRUCTIONS
Please make an appt with Nephrology for prevention of kidney stones.   [facility]  [facility]    [address]  [place]  [address]  T:  [phone]

## 2023-04-18 ENCOUNTER — MEDICAL CORRESPONDENCE (OUTPATIENT)
Dept: HEALTH INFORMATION MANAGEMENT | Facility: CLINIC | Age: 73
End: 2023-04-18
Payer: COMMERCIAL

## 2023-05-13 ENCOUNTER — HOSPITAL ENCOUNTER (EMERGENCY)
Facility: CLINIC | Age: 73
Discharge: HOME | End: 2023-05-13
Attending: EMERGENCY MEDICINE | Admitting: EMERGENCY MEDICINE
Payer: COMMERCIAL

## 2023-05-13 VITALS
OXYGEN SATURATION: 97 % | HEIGHT: 64 IN | SYSTOLIC BLOOD PRESSURE: 110 MMHG | DIASTOLIC BLOOD PRESSURE: 60 MMHG | BODY MASS INDEX: 32.44 KG/M2 | WEIGHT: 190 LBS | TEMPERATURE: 97.3 F | HEART RATE: 93 BPM | RESPIRATION RATE: 16 BRPM

## 2023-05-13 DIAGNOSIS — K11.5 SALIVARY STONE: Primary | ICD-10-CM

## 2023-05-13 DIAGNOSIS — R60.0 SWELLING OF RIGHT PAROTID GLAND: ICD-10-CM

## 2023-05-13 PROCEDURE — 99282 EMERGENCY DEPT VISIT SF MDM: CPT

## 2023-05-13 NOTE — ED PROVIDER NOTES
"    History     Chief Complaint:  Facial Swelling       The history is provided by the patient.      Leonor Bernstein is a 73 year old female who presents with facial swelling. 3 hours prior to arrival, she developed rapid onset of swelling over the right parotid gland. She was eating around the time of onset. At bedside, the swelling has mildly improved since it was initially noted. She does not have any known allergies. She denies associated itching, pain, fever, dysphagia, or dyspnea.     Independent Historian: None     Review of External Notes: None      ROS:  Review of Systems  See HPI     Allergies:  Ambien [Zolpidem]  Contrast Dye  Food  Penicillins     Medications:    Norvasc   Wellbutrin   Cleocin   Fluoxetine   Flomax     Past Medical History:    Anemia   Arthritis   CKD (chronic kidney disease) stage 3, GFR 30-59 ml/min (H)   Depressive disorder   Gastroesophageal reflux disease   Hemorrhoids   Hypertension   Mumps   Obese   Osteoporosis   PONV (postoperative nausea and vomiting)   RLS (restless legs syndrome)   Sciatica of right side   Sleep apnea   Vitamin deficiency   Spinal stenosis   Degenerative scoliosis   OA    Past Surgical History:    R TKA  Cholecystectomy   R ureter stent insertion   Lumbar decompression   Gastric bypass   Ovarian cystectomy   Tubal ligation   Lumbar laminectomy/discectomy   R total hip arthroplasty   Myomectomy     Family History:    Brother - heart disease, HTN  Father - heart disease, HTN  Mother  Anemia, cancer, HTN, kidney disease     Social History:  PCP: Mini Hayden   The patient presents to the ED alone via private vehicle     Physical Exam     Patient Vitals for the past 24 hrs:   BP Temp Temp src Pulse Resp SpO2 Height Weight   05/13/23 1822 -- -- -- -- 16 -- -- --   05/13/23 1820 110/60 97.3  F (36.3  C) Temporal 93 -- 97 % 1.626 m (5' 4\") 86.2 kg (190 lb)        Physical Exam  General: Patient in mild distress.  Alert and cooperative with exam. Normal " mentation  HEENT: She has mild swelling over right parotid gland with mild associated tenderness. No associated skin changes. No evidence of intraoral infection, deep space infection, or airway obstruction. Mild swelling and tenderness over the parotid duct papillae. Conjunctiva without injection or scleral icterus. External ears normal.  Respiratory: Breathing comfortably on room air  CV: Normal rate, all extremities well perfused  GI:  Non-distended abdomen  Skin: Warm, dry, no rashes/open wounds on exposed skin  Musculoskeletal: No obvious deformities  Neuro: Alert, answers questions appropriately. No gross motor deficits    Emergency Department Course   Emergency Department Course & Assessments:    Interventions:  None     Independent Interpretation (X-rays, CTs, rhythm strip):  N/A     Consultations/Discussion of Management or Tests:  N/A    Social Determinants of Health affecting care:  None      Assessments:   I obtained history and examined the patient as noted above. I explained findings and discussed plan for discharge home.    Disposition:  The patient was discharged to home.     Impression & Plan    Medical Decision Making:  Patient is a 73-year-old female who presents with right sided parotid swelling that began while eating earlier today.  Patient's medical history and records reviewed.  On evaluation patient does have mild right parotid swelling without overlying skin change or evidence of deep space infection.  There is no evidence of allergic reaction.  History and clinical evaluation demonstrates evidence of likely parotid salivary stone.  There is no clinical evidence of infection.  Discussed supportive care including hard candies and Tylenol/ibuprofen as needed for pain control.  Close follow-up with PCP as scheduled on Monday.  Return precautions discussed.  Patient discharged home.    Diagnosis:    ICD-10-CM    1. Salivary stone  K11.5       2. Swelling of right parotid gland  R60.0             Scribe Disclosure:  I, Maurisio Norton, am serving as a scribe at 7:09 PM on 5/13/2023 to document services personally performed by Kei Herring DO based on my observations and the provider's statements to me.             Kei Herring DO  05/14/23 0158

## 2023-05-13 NOTE — ED TRIAGE NOTES
Patient has swelling to right posterior jaw. States started a few hours ago. Denies tooth, jaw,and ear pain. States feels tight in the area due to the swelling. Denies throat tightness or breathing issues. Airway is intact.

## 2023-05-18 RX ORDER — CIPROFLOXACIN 500 MG/1
500 TABLET, FILM COATED ORAL 2 TIMES DAILY
COMMUNITY
End: 2023-05-19

## 2023-05-18 RX ORDER — TRAMADOL HYDROCHLORIDE 50 MG/1
50 TABLET ORAL EVERY 6 HOURS PRN
COMMUNITY
End: 2023-05-19

## 2023-05-18 RX ORDER — POTASSIUM CITRATE 10 MEQ/1
1 TABLET, EXTENDED RELEASE ORAL 2 TIMES DAILY
COMMUNITY

## 2023-05-22 ENCOUNTER — ANESTHESIA EVENT (OUTPATIENT)
Dept: SURGERY | Facility: CLINIC | Age: 73
End: 2023-05-22
Payer: COMMERCIAL

## 2023-05-22 ENCOUNTER — HOSPITAL ENCOUNTER (OUTPATIENT)
Facility: CLINIC | Age: 73
Discharge: HOME | End: 2023-05-23
Attending: STUDENT IN AN ORGANIZED HEALTH CARE EDUCATION/TRAINING PROGRAM | Admitting: STUDENT IN AN ORGANIZED HEALTH CARE EDUCATION/TRAINING PROGRAM
Payer: COMMERCIAL

## 2023-05-22 ENCOUNTER — APPOINTMENT (OUTPATIENT)
Dept: GENERAL RADIOLOGY | Facility: CLINIC | Age: 73
End: 2023-05-22
Attending: STUDENT IN AN ORGANIZED HEALTH CARE EDUCATION/TRAINING PROGRAM
Payer: COMMERCIAL

## 2023-05-22 ENCOUNTER — ANESTHESIA (OUTPATIENT)
Dept: SURGERY | Facility: CLINIC | Age: 73
End: 2023-05-22
Payer: COMMERCIAL

## 2023-05-22 DIAGNOSIS — Z98.1 S/P CERVICAL SPINAL FUSION: Primary | ICD-10-CM

## 2023-05-22 PROCEDURE — 272N000001 HC OR GENERAL SUPPLY STERILE: Performed by: STUDENT IN AN ORGANIZED HEALTH CARE EDUCATION/TRAINING PROGRAM

## 2023-05-22 PROCEDURE — 999N000141 HC STATISTIC PRE-PROCEDURE NURSING ASSESSMENT: Performed by: STUDENT IN AN ORGANIZED HEALTH CARE EDUCATION/TRAINING PROGRAM

## 2023-05-22 PROCEDURE — 999N000179 XR SURGERY CARM FLUORO LESS THAN 5 MIN W STILLS

## 2023-05-22 PROCEDURE — 922N000001 HC NEURO MONITORING SERVICE, UP TO 7 HOURS (T1FEE): Performed by: STUDENT IN AN ORGANIZED HEALTH CARE EDUCATION/TRAINING PROGRAM

## 2023-05-22 PROCEDURE — 250N000011 HC RX IP 250 OP 636: Performed by: STUDENT IN AN ORGANIZED HEALTH CARE EDUCATION/TRAINING PROGRAM

## 2023-05-22 PROCEDURE — 250N000009 HC RX 250: Performed by: STUDENT IN AN ORGANIZED HEALTH CARE EDUCATION/TRAINING PROGRAM

## 2023-05-22 PROCEDURE — 250N000011 HC RX IP 250 OP 636: Performed by: ANESTHESIOLOGY

## 2023-05-22 PROCEDURE — 710N000009 HC RECOVERY PHASE 1, LEVEL 1, PER MIN: Performed by: STUDENT IN AN ORGANIZED HEALTH CARE EDUCATION/TRAINING PROGRAM

## 2023-05-22 PROCEDURE — 250N000025 HC SEVOFLURANE, PER MIN: Performed by: STUDENT IN AN ORGANIZED HEALTH CARE EDUCATION/TRAINING PROGRAM

## 2023-05-22 PROCEDURE — 250N000013 HC RX MED GY IP 250 OP 250 PS 637: Performed by: STUDENT IN AN ORGANIZED HEALTH CARE EDUCATION/TRAINING PROGRAM

## 2023-05-22 PROCEDURE — 272N000282 HC OR IOM SUPPLIES OPNP: Performed by: STUDENT IN AN ORGANIZED HEALTH CARE EDUCATION/TRAINING PROGRAM

## 2023-05-22 PROCEDURE — 250N000009 HC RX 250: Performed by: ANESTHESIOLOGY

## 2023-05-22 PROCEDURE — C1713 ANCHOR/SCREW BN/BN,TIS/BN: HCPCS | Performed by: STUDENT IN AN ORGANIZED HEALTH CARE EDUCATION/TRAINING PROGRAM

## 2023-05-22 PROCEDURE — C1762 CONN TISS, HUMAN(INC FASCIA): HCPCS | Performed by: STUDENT IN AN ORGANIZED HEALTH CARE EDUCATION/TRAINING PROGRAM

## 2023-05-22 PROCEDURE — 370N000017 HC ANESTHESIA TECHNICAL FEE, PER MIN: Performed by: STUDENT IN AN ORGANIZED HEALTH CARE EDUCATION/TRAINING PROGRAM

## 2023-05-22 PROCEDURE — 360N000084 HC SURGERY LEVEL 4 W/ FLUORO, PER MIN: Performed by: STUDENT IN AN ORGANIZED HEALTH CARE EDUCATION/TRAINING PROGRAM

## 2023-05-22 PROCEDURE — 258N000003 HC RX IP 258 OP 636: Performed by: ANESTHESIOLOGY

## 2023-05-22 DEVICE — GRAFT BONE ACCELERATE GRAFTON 2 CANNULA SET 3CC T50203: Type: IMPLANTABLE DEVICE | Site: SPINE CERVICAL | Status: FUNCTIONAL

## 2023-05-22 DEVICE — IMP PLATE CERV MEDT ZEVO 35MM 2 LVL 3002035: Type: IMPLANTABLE DEVICE | Site: SPINE CERVICAL | Status: FUNCTIONAL

## 2023-05-22 DEVICE — INTERBODY FUSION DEVICE  6 DEGREE SMALL 6MM
Type: IMPLANTABLE DEVICE | Site: SPINE CERVICAL | Status: FUNCTIONAL
Brand: ENDOSKELETON® TC NANOLOCK® SURFACE TECHNOLOGY

## 2023-05-22 DEVICE — IMP SCR MEDT ZEVO 3.5X15MM SD VA 7713515: Type: IMPLANTABLE DEVICE | Site: SPINE CERVICAL | Status: FUNCTIONAL

## 2023-05-22 RX ORDER — TRANEXAMIC ACID 10 MG/ML
1 INJECTION, SOLUTION INTRAVENOUS CONTINUOUS
Status: DISCONTINUED | OUTPATIENT
Start: 2023-05-22 | End: 2023-05-22 | Stop reason: HOSPADM

## 2023-05-22 RX ORDER — CLINDAMYCIN PHOSPHATE 900 MG/50ML
900 INJECTION, SOLUTION INTRAVENOUS EVERY 8 HOURS
Status: COMPLETED | OUTPATIENT
Start: 2023-05-22 | End: 2023-05-23

## 2023-05-22 RX ORDER — ACETAMINOPHEN 325 MG/1
650 TABLET ORAL EVERY 4 HOURS PRN
Status: DISCONTINUED | OUTPATIENT
Start: 2023-05-25 | End: 2023-05-23 | Stop reason: HOSPADM

## 2023-05-22 RX ORDER — GABAPENTIN 100 MG/1
100 CAPSULE ORAL
Status: COMPLETED | OUTPATIENT
Start: 2023-05-22 | End: 2023-05-22

## 2023-05-22 RX ORDER — SODIUM CHLORIDE, SODIUM LACTATE, POTASSIUM CHLORIDE, CALCIUM CHLORIDE 600; 310; 30; 20 MG/100ML; MG/100ML; MG/100ML; MG/100ML
INJECTION, SOLUTION INTRAVENOUS CONTINUOUS PRN
Status: DISCONTINUED | OUTPATIENT
Start: 2023-05-22 | End: 2023-05-22

## 2023-05-22 RX ORDER — METHOCARBAMOL 500 MG/1
500 TABLET, FILM COATED ORAL EVERY 6 HOURS PRN
Status: DISCONTINUED | OUTPATIENT
Start: 2023-05-22 | End: 2023-05-23 | Stop reason: HOSPADM

## 2023-05-22 RX ORDER — OXYCODONE HYDROCHLORIDE 5 MG/1
10 TABLET ORAL EVERY 4 HOURS PRN
Status: DISCONTINUED | OUTPATIENT
Start: 2023-05-22 | End: 2023-05-23 | Stop reason: HOSPADM

## 2023-05-22 RX ORDER — AMOXICILLIN 250 MG
1 CAPSULE ORAL 2 TIMES DAILY
Status: DISCONTINUED | OUTPATIENT
Start: 2023-05-22 | End: 2023-05-23 | Stop reason: HOSPADM

## 2023-05-22 RX ORDER — DEXAMETHASONE SODIUM PHOSPHATE 4 MG/ML
INJECTION, SOLUTION INTRA-ARTICULAR; INTRALESIONAL; INTRAMUSCULAR; INTRAVENOUS; SOFT TISSUE PRN
Status: DISCONTINUED | OUTPATIENT
Start: 2023-05-22 | End: 2023-05-22

## 2023-05-22 RX ORDER — BUPROPION HYDROCHLORIDE 150 MG/1
150 TABLET ORAL EVERY MORNING
Status: DISCONTINUED | OUTPATIENT
Start: 2023-05-23 | End: 2023-05-23 | Stop reason: HOSPADM

## 2023-05-22 RX ORDER — HYDROMORPHONE HCL IN WATER/PF 6 MG/30 ML
0.4 PATIENT CONTROLLED ANALGESIA SYRINGE INTRAVENOUS
Status: DISCONTINUED | OUTPATIENT
Start: 2023-05-22 | End: 2023-05-23 | Stop reason: HOSPADM

## 2023-05-22 RX ORDER — FLUOXETINE 20 MG/1
60 TABLET, FILM COATED ORAL EVERY MORNING
Status: DISCONTINUED | OUTPATIENT
Start: 2023-05-23 | End: 2023-05-22

## 2023-05-22 RX ORDER — SODIUM CHLORIDE, SODIUM LACTATE, POTASSIUM CHLORIDE, CALCIUM CHLORIDE 600; 310; 30; 20 MG/100ML; MG/100ML; MG/100ML; MG/100ML
INJECTION, SOLUTION INTRAVENOUS CONTINUOUS
Status: DISCONTINUED | OUTPATIENT
Start: 2023-05-22 | End: 2023-05-22 | Stop reason: HOSPADM

## 2023-05-22 RX ORDER — ONDANSETRON 4 MG/1
4 TABLET, ORALLY DISINTEGRATING ORAL EVERY 6 HOURS PRN
Status: DISCONTINUED | OUTPATIENT
Start: 2023-05-22 | End: 2023-05-23 | Stop reason: HOSPADM

## 2023-05-22 RX ORDER — FENTANYL CITRATE 0.05 MG/ML
25 INJECTION, SOLUTION INTRAMUSCULAR; INTRAVENOUS EVERY 5 MIN PRN
Status: DISCONTINUED | OUTPATIENT
Start: 2023-05-22 | End: 2023-05-22 | Stop reason: HOSPADM

## 2023-05-22 RX ORDER — LIDOCAINE 40 MG/G
CREAM TOPICAL
Status: DISCONTINUED | OUTPATIENT
Start: 2023-05-22 | End: 2023-05-23 | Stop reason: HOSPADM

## 2023-05-22 RX ORDER — ONDANSETRON 2 MG/ML
INJECTION INTRAMUSCULAR; INTRAVENOUS PRN
Status: DISCONTINUED | OUTPATIENT
Start: 2023-05-22 | End: 2023-05-22

## 2023-05-22 RX ORDER — BISACODYL 10 MG
10 SUPPOSITORY, RECTAL RECTAL DAILY PRN
Status: DISCONTINUED | OUTPATIENT
Start: 2023-05-22 | End: 2023-05-23 | Stop reason: HOSPADM

## 2023-05-22 RX ORDER — NALOXONE HYDROCHLORIDE 0.4 MG/ML
0.4 INJECTION, SOLUTION INTRAMUSCULAR; INTRAVENOUS; SUBCUTANEOUS
Status: DISCONTINUED | OUTPATIENT
Start: 2023-05-22 | End: 2023-05-23 | Stop reason: HOSPADM

## 2023-05-22 RX ORDER — HYDROMORPHONE HCL IN WATER/PF 6 MG/30 ML
0.2 PATIENT CONTROLLED ANALGESIA SYRINGE INTRAVENOUS
Status: DISCONTINUED | OUTPATIENT
Start: 2023-05-22 | End: 2023-05-23 | Stop reason: HOSPADM

## 2023-05-22 RX ORDER — MAGNESIUM HYDROXIDE 1200 MG/15ML
LIQUID ORAL PRN
Status: DISCONTINUED | OUTPATIENT
Start: 2023-05-22 | End: 2023-05-22 | Stop reason: HOSPADM

## 2023-05-22 RX ORDER — FENTANYL CITRATE 0.05 MG/ML
50 INJECTION, SOLUTION INTRAMUSCULAR; INTRAVENOUS EVERY 5 MIN PRN
Status: DISCONTINUED | OUTPATIENT
Start: 2023-05-22 | End: 2023-05-22 | Stop reason: HOSPADM

## 2023-05-22 RX ORDER — OXYCODONE HYDROCHLORIDE 5 MG/1
5 TABLET ORAL
Status: DISCONTINUED | OUTPATIENT
Start: 2023-05-22 | End: 2023-05-22 | Stop reason: HOSPADM

## 2023-05-22 RX ORDER — POLYETHYLENE GLYCOL 3350 17 G/17G
17 POWDER, FOR SOLUTION ORAL DAILY
Status: DISCONTINUED | OUTPATIENT
Start: 2023-05-23 | End: 2023-05-23 | Stop reason: HOSPADM

## 2023-05-22 RX ORDER — OXYCODONE HYDROCHLORIDE 5 MG/1
5 TABLET ORAL EVERY 4 HOURS PRN
Status: DISCONTINUED | OUTPATIENT
Start: 2023-05-22 | End: 2023-05-23 | Stop reason: HOSPADM

## 2023-05-22 RX ORDER — NALOXONE HYDROCHLORIDE 0.4 MG/ML
0.2 INJECTION, SOLUTION INTRAMUSCULAR; INTRAVENOUS; SUBCUTANEOUS
Status: DISCONTINUED | OUTPATIENT
Start: 2023-05-22 | End: 2023-05-23 | Stop reason: HOSPADM

## 2023-05-22 RX ORDER — OXYCODONE HYDROCHLORIDE 5 MG/1
10 TABLET ORAL
Status: DISCONTINUED | OUTPATIENT
Start: 2023-05-22 | End: 2023-05-22 | Stop reason: HOSPADM

## 2023-05-22 RX ORDER — PROPOFOL 10 MG/ML
INJECTION, EMULSION INTRAVENOUS CONTINUOUS PRN
Status: DISCONTINUED | OUTPATIENT
Start: 2023-05-22 | End: 2023-05-22

## 2023-05-22 RX ORDER — HYDROMORPHONE HCL IN WATER/PF 6 MG/30 ML
0.4 PATIENT CONTROLLED ANALGESIA SYRINGE INTRAVENOUS EVERY 5 MIN PRN
Status: DISCONTINUED | OUTPATIENT
Start: 2023-05-22 | End: 2023-05-22 | Stop reason: HOSPADM

## 2023-05-22 RX ORDER — PROPOFOL 10 MG/ML
INJECTION, EMULSION INTRAVENOUS PRN
Status: DISCONTINUED | OUTPATIENT
Start: 2023-05-22 | End: 2023-05-22

## 2023-05-22 RX ORDER — GABAPENTIN 100 MG/1
100 CAPSULE ORAL AT BEDTIME
Status: DISCONTINUED | OUTPATIENT
Start: 2023-05-22 | End: 2023-05-23 | Stop reason: HOSPADM

## 2023-05-22 RX ORDER — EPHEDRINE SULFATE 50 MG/ML
INJECTION, SOLUTION INTRAMUSCULAR; INTRAVENOUS; SUBCUTANEOUS PRN
Status: DISCONTINUED | OUTPATIENT
Start: 2023-05-22 | End: 2023-05-22

## 2023-05-22 RX ORDER — ONDANSETRON 4 MG/1
4 TABLET, ORALLY DISINTEGRATING ORAL EVERY 30 MIN PRN
Status: DISCONTINUED | OUTPATIENT
Start: 2023-05-22 | End: 2023-05-22 | Stop reason: HOSPADM

## 2023-05-22 RX ORDER — LIDOCAINE HYDROCHLORIDE 20 MG/ML
INJECTION, SOLUTION INFILTRATION; PERINEURAL PRN
Status: DISCONTINUED | OUTPATIENT
Start: 2023-05-22 | End: 2023-05-22

## 2023-05-22 RX ORDER — ONDANSETRON 2 MG/ML
4 INJECTION INTRAMUSCULAR; INTRAVENOUS EVERY 30 MIN PRN
Status: DISCONTINUED | OUTPATIENT
Start: 2023-05-22 | End: 2023-05-22 | Stop reason: HOSPADM

## 2023-05-22 RX ORDER — ACETAMINOPHEN 325 MG/1
975 TABLET ORAL EVERY 8 HOURS
Status: DISCONTINUED | OUTPATIENT
Start: 2023-05-22 | End: 2023-05-23 | Stop reason: HOSPADM

## 2023-05-22 RX ORDER — FENTANYL CITRATE 50 UG/ML
INJECTION, SOLUTION INTRAMUSCULAR; INTRAVENOUS PRN
Status: DISCONTINUED | OUTPATIENT
Start: 2023-05-22 | End: 2023-05-22

## 2023-05-22 RX ORDER — ONDANSETRON 2 MG/ML
4 INJECTION INTRAMUSCULAR; INTRAVENOUS EVERY 6 HOURS PRN
Status: DISCONTINUED | OUTPATIENT
Start: 2023-05-22 | End: 2023-05-23 | Stop reason: HOSPADM

## 2023-05-22 RX ORDER — CLINDAMYCIN PHOSPHATE 900 MG/50ML
900 INJECTION, SOLUTION INTRAVENOUS
Status: DISCONTINUED | OUTPATIENT
Start: 2023-05-22 | End: 2023-05-22 | Stop reason: HOSPADM

## 2023-05-22 RX ORDER — TRANEXAMIC ACID 10 MG/ML
1000 INJECTION, SOLUTION INTRAVENOUS ONCE
Status: COMPLETED | OUTPATIENT
Start: 2023-05-22 | End: 2023-05-22

## 2023-05-22 RX ORDER — CLINDAMYCIN PHOSPHATE 900 MG/50ML
900 INJECTION, SOLUTION INTRAVENOUS SEE ADMIN INSTRUCTIONS
Status: DISCONTINUED | OUTPATIENT
Start: 2023-05-22 | End: 2023-05-22 | Stop reason: HOSPADM

## 2023-05-22 RX ORDER — HYDROMORPHONE HCL IN WATER/PF 6 MG/30 ML
0.2 PATIENT CONTROLLED ANALGESIA SYRINGE INTRAVENOUS EVERY 5 MIN PRN
Status: DISCONTINUED | OUTPATIENT
Start: 2023-05-22 | End: 2023-05-22 | Stop reason: HOSPADM

## 2023-05-22 RX ORDER — AMLODIPINE BESYLATE 5 MG/1
5 TABLET ORAL EVERY MORNING
Status: DISCONTINUED | OUTPATIENT
Start: 2023-05-23 | End: 2023-05-23 | Stop reason: HOSPADM

## 2023-05-22 RX ORDER — BUPIVACAINE HYDROCHLORIDE AND EPINEPHRINE 2.5; 5 MG/ML; UG/ML
INJECTION, SOLUTION INFILTRATION; PERINEURAL PRN
Status: DISCONTINUED | OUTPATIENT
Start: 2023-05-22 | End: 2023-05-22 | Stop reason: HOSPADM

## 2023-05-22 RX ADMIN — PROPOFOL 50 MG: 10 INJECTION, EMULSION INTRAVENOUS at 08:44

## 2023-05-22 RX ADMIN — TRANEXAMIC ACID 1 MG/KG/HR: 10 INJECTION, SOLUTION INTRAVENOUS at 08:03

## 2023-05-22 RX ADMIN — ACETAMINOPHEN 975 MG: 325 TABLET ORAL at 22:14

## 2023-05-22 RX ADMIN — DEXAMETHASONE SODIUM PHOSPHATE 10 MG: 4 INJECTION, SOLUTION INTRA-ARTICULAR; INTRALESIONAL; INTRAMUSCULAR; INTRAVENOUS; SOFT TISSUE at 08:33

## 2023-05-22 RX ADMIN — REMIFENTANIL HYDROCHLORIDE 0.3 MCG/KG/MIN: 1 INJECTION, POWDER, LYOPHILIZED, FOR SOLUTION INTRAVENOUS at 08:59

## 2023-05-22 RX ADMIN — SODIUM CHLORIDE, POTASSIUM CHLORIDE, SODIUM LACTATE AND CALCIUM CHLORIDE: 600; 310; 30; 20 INJECTION, SOLUTION INTRAVENOUS at 10:28

## 2023-05-22 RX ADMIN — SODIUM CHLORIDE, SODIUM LACTATE, POTASSIUM CHLORIDE, CALCIUM CHLORIDE: 600; 310; 30; 20 INJECTION, SOLUTION INTRAVENOUS at 08:07

## 2023-05-22 RX ADMIN — SODIUM CHLORIDE, POTASSIUM CHLORIDE, SODIUM LACTATE AND CALCIUM CHLORIDE: 600; 310; 30; 20 INJECTION, SOLUTION INTRAVENOUS at 08:09

## 2023-05-22 RX ADMIN — PHENYLEPHRINE HYDROCHLORIDE 0.5 MCG/KG/MIN: 10 INJECTION INTRAVENOUS at 08:02

## 2023-05-22 RX ADMIN — EPHEDRINE SULFATE 5 MG: 50 INJECTION, SOLUTION INTRAMUSCULAR; INTRAVENOUS; SUBCUTANEOUS at 09:18

## 2023-05-22 RX ADMIN — SODIUM CHLORIDE, POTASSIUM CHLORIDE, SODIUM LACTATE AND CALCIUM CHLORIDE: 600; 310; 30; 20 INJECTION, SOLUTION INTRAVENOUS at 11:50

## 2023-05-22 RX ADMIN — ACETAMINOPHEN 975 MG: 325 TABLET ORAL at 17:42

## 2023-05-22 RX ADMIN — FENTANYL CITRATE 100 MCG: 50 INJECTION, SOLUTION INTRAMUSCULAR; INTRAVENOUS at 07:48

## 2023-05-22 RX ADMIN — LIDOCAINE HYDROCHLORIDE 60 MG: 20 INJECTION, SOLUTION INFILTRATION; PERINEURAL at 07:48

## 2023-05-22 RX ADMIN — PROPOFOL 150 MG: 10 INJECTION, EMULSION INTRAVENOUS at 07:48

## 2023-05-22 RX ADMIN — PHENYLEPHRINE HYDROCHLORIDE 100 MCG: 10 INJECTION INTRAVENOUS at 08:27

## 2023-05-22 RX ADMIN — SUCCINYLCHOLINE CHLORIDE 100 MG: 20 INJECTION, SOLUTION INTRAMUSCULAR; INTRAVENOUS; PARENTERAL at 07:48

## 2023-05-22 RX ADMIN — PROPOFOL 30 MG: 10 INJECTION, EMULSION INTRAVENOUS at 11:44

## 2023-05-22 RX ADMIN — EPHEDRINE SULFATE 10 MG: 50 INJECTION, SOLUTION INTRAMUSCULAR; INTRAVENOUS; SUBCUTANEOUS at 09:21

## 2023-05-22 RX ADMIN — CLINDAMYCIN PHOSPHATE 900 MG: 900 INJECTION, SOLUTION INTRAVENOUS at 17:41

## 2023-05-22 RX ADMIN — GABAPENTIN 100 MG: 100 CAPSULE ORAL at 06:16

## 2023-05-22 RX ADMIN — EPHEDRINE SULFATE 5 MG: 50 INJECTION, SOLUTION INTRAMUSCULAR; INTRAVENOUS; SUBCUTANEOUS at 09:15

## 2023-05-22 RX ADMIN — CLINDAMYCIN PHOSPHATE 900 MG: 900 INJECTION, SOLUTION INTRAVENOUS at 22:18

## 2023-05-22 RX ADMIN — Medication 5 MG: at 09:14

## 2023-05-22 RX ADMIN — GABAPENTIN 100 MG: 100 CAPSULE ORAL at 22:14

## 2023-05-22 RX ADMIN — PROPOFOL 175 MCG/KG/MIN: 10 INJECTION, EMULSION INTRAVENOUS at 07:55

## 2023-05-22 RX ADMIN — HYDROMORPHONE HYDROCHLORIDE 0.5 MG: 1 INJECTION, SOLUTION INTRAMUSCULAR; INTRAVENOUS; SUBCUTANEOUS at 11:46

## 2023-05-22 RX ADMIN — TRANEXAMIC ACID 1000 MG: 10 INJECTION, SOLUTION INTRAVENOUS at 07:45

## 2023-05-22 RX ADMIN — HYDROMORPHONE HYDROCHLORIDE 0.5 MG: 1 INJECTION, SOLUTION INTRAMUSCULAR; INTRAVENOUS; SUBCUTANEOUS at 08:38

## 2023-05-22 RX ADMIN — ONDANSETRON 4 MG: 2 INJECTION INTRAMUSCULAR; INTRAVENOUS at 11:42

## 2023-05-22 RX ADMIN — PHENYLEPHRINE HYDROCHLORIDE 100 MCG: 10 INJECTION INTRAVENOUS at 07:51

## 2023-05-22 RX ADMIN — CLINDAMYCIN PHOSPHATE 900 MG: 900 INJECTION, SOLUTION INTRAVENOUS at 06:36

## 2023-05-22 RX ADMIN — PHENYLEPHRINE HYDROCHLORIDE 100 MCG: 10 INJECTION INTRAVENOUS at 09:12

## 2023-05-22 ASSESSMENT — LIFESTYLE VARIABLES: TOBACCO_USE: 0

## 2023-05-22 ASSESSMENT — ACTIVITIES OF DAILY LIVING (ADL)
ADLS_ACUITY_SCORE: 38

## 2023-05-22 ASSESSMENT — ENCOUNTER SYMPTOMS: SEIZURES: 0
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
## 2023-05-22 NOTE — ANESTHESIA POSTPROCEDURE EVALUATION
Patient: Leonor Bernstein    Procedure: Procedure(s):  CERVICAL 5 TO CERVICAL 6 ANTERIOR CERVICAL DISCECTOMY AND FUSION WITH CERVICAL 6 TO CERVICAL 7 ANTERIOR CERVICAL DISCECTOMY AND FUSION       Anesthesia Type:  General    Note:     Postop Pain Control: Uneventful            Sign Out: Well controlled pain   PONV: No   Neuro/Psych: Uneventful            Sign Out: Acceptable/Baseline neuro status   Airway/Respiratory: Uneventful            Sign Out: Acceptable/Baseline resp. status   CV/Hemodynamics: Uneventful            Sign Out: Acceptable CV status; No obvious hypovolemia; No obvious fluid overload   Other NRE: NONE   DID A NON-ROUTINE EVENT OCCUR? No           Last vitals:  Vitals Value Taken Time   /78 05/22/23 1230   Temp 36.8  C (98.2  F) 05/22/23 1200   Pulse 98 05/22/23 1242   Resp 17 05/22/23 1242   SpO2 94 % 05/22/23 1242   Vitals shown include unvalidated device data.    Electronically Signed By: Otis Morton MD  May 22, 2023  12:43 PM

## 2023-05-22 NOTE — OR NURSING
RAJI delgado not staying depressed. Dr Noe notified and said he knows about it and it is ok to gravity.

## 2023-05-22 NOTE — INTERVAL H&P NOTE
"I have reviewed the surgical (or preoperative) H&P that is linked to this encounter, and examined the patient. There are no significant changes    Clinical Conditions Present on Arrival:  Clinically Significant Risk Factors Present on Admission                  # Obesity: Estimated body mass index is 31.98 kg/m  as calculated from the following:    Height as of this encounter: 1.651 m (5' 5\").    Weight as of this encounter: 87.2 kg (192 lb 3.2 oz).       "

## 2023-05-22 NOTE — OP NOTE
[facility]  Operative Note      Leonor Bernstein MRN# [id number]   YOB: 1950  Procedure Date:  5/22/2023  Age: 73 year old         PREOPERATIVE DIAGNOSIS:  1.   Cervical foraminal stenosis at C5-C7 causing direct compression of nerve root C6 and C7  causing radiculopathy  2.   C5-C6 myelomalacia   3.   Cervical myelopathy    POSTOPERATIVE DIAGNOSIS:  SAME     1.C5-C7 Anterior Cervical Discectomy and Fusion    2. Anterior Spinal Instrumentation  C5-C7 Plate    3.Insertion of biomechanical interbody device  Medtronic cages     4.  Use of local autograft same incision     5. Use of Operative Microscope    6. Use of Fluoroscopy    7. Use of Intraoperative Neuromonitoring       Findings:  Severe foraminal stenosis at C5-C7    Neuromonitoring was unchanged throughout the whole procedure. The implants had good fixation.  The graft had good fit and fill.  The construct felt stable.  The decompression was generous.  I was happy with the overall operation.  There were no obvious complications or issues.    Estimated Blood Loss  ~100      Surgeon:  VIJAY TAPIA MD    Assistant:  Jocelyn Lóepz PA-C    Implants:   Implant Name Type Inv. Item Serial No.  Lot No. LRB No. Used Action   GRAFT BONE ACCELERATE RUFUS 2 CANNULA SET [facility] [id number] - [id number] Graft GRAFT BONE ACCELERATE RUFUS 2 CANNULA SET [facility] [id number] [employer]  N/A 1 Implanted   CAGE SPNL 14X6MM ENDOSKELETON TC 6D 12MM SM TI RADOPQ LG [facility] - [id number] Metallic Hardware/[place] CAGE SPNL 14X6MM ENDOSKELETON TC 6D 12MM SM TI RADOPQ LG [facility]  [employer] [id number] N/A 1 Implanted   CAGE SPNL 14X6MM ENDOSKELETON TC 6D 12MM SM TI RADOPQ LG WD - [id number] Metallic Hardware/[place] CAGE SPNL 14X6MM ENDOSKELETON TC 6D 12MM SM TI RADOPQ LG WDW  [employer] [id number] N/A 1 Implanted   IMP PLATE CERV MEDT ZEVO 35MM 2 LVL [id number] - [id number] Metallic Hardware/[place] IMP PLATE CERV MEDT ZEVO 35MM 2 LVL [id number]   [employer] LOAD 41 11 [id number] N/A 1 Implanted   IMP SCR MEDT ZEVO 3.5X13MM SD [place] [id number] - [id number] Metallic Hardware/[place] IMP SCR MEDT ZEVO 3.5X13MM SD [place] [id number]  [employer] LOAD 41 11 [id number] N/A 2 Implanted   IMP SCR MEDT ZEVO 3.5X13MM SD [place] [id number] - [id number] Metallic Hardware/[place] IMP SCR MEDT ZEVO 3.5X13MM SD [place] [id number]  [employer] LOAD 41 11 [id number] N/A 4 W[place]   IMP SCR MEDT ZEVO 3.5X15MM SD [place] [id number] - [id number] Metallic Hardware/[place] IMP SCR MEDT ZEVO 3.5X15MM SD [place] [id number]  [employer] LOAD 41 11 [id number] N/A 4 Implanted       Indications for procedure    The patient is a 73 year old female that presented with cervical myeloradiculopathy. After reviewing the patient's imaging studies and examination, the decision was made to proceed with the above procedure.The patient has been fully counseled on the risks, benefits, and alternatives.  All questions have been answered to the patient's satisfaction.  At this point in time, we are ready to proceed with the planned surgery.        Description of procedure:  After informed consent was obtained the patient was taken to the preoperative holding area where the correct site of surgery was identified in accordance with Hospital policy.  The patient was then taken to the operative suite where the patient was positioned supine on the operative table with their head in a jelly donut .  The anesthesia team then performed endotracheal intubation with the head maintained in a neutral posture. A bump was then placed under the upper thoracic spine which allowed the neck to rest in a preferred posture. Arms were then placed to the side, padded and secured.  All bony prominences were well-padded.  Shoulders were taped to improve visualization of the disc space.  When I was happy with the overall positioning,  The patient was prepped and draped in the usual sterile fashion.        A hospital pause was called.  Procedural pause conducted to  verify: correct patient identity, procedure to be performed, and as applicable, correct side and site, correct patient position, and availability of implants, special equipment, or special requirements.  We also confirmed that perioperative antibiotic was in and the SCDs were on and working.  We then infiltrated the planned incision using Marcaine and epinephrine       Exposure    We then used a skin knife to incise through the skin down to the subcutaneous fat. We then used the Bovie to dissect to and then through the platysma layer in line with the incision.  We developed generous sub-platysmal flaps.  At that point, we identified the plane just medial to the sternocleidomastoid muscle and proceeded to bluntly dissect using retractors and a peanut dissector, to create a plane lateral to the strap muscles and central structures and medial to the SCM and carotid sheath. When we were down onto the anterior surface of the spine, we palpated for the anterior lip of the targeted disc space.  We then placed a needle at what we palpated to be the level.  We brought the C-arm in a sterile fashion and counting from C2, confirmed on a lateral fluoroscopic image that the needle was in fact at the correct level. This was our radiographic timeout.  With the needle still in place we then reobtained visualization of the anterior spine. We then under direct visualization removed the needle and immediately Bovied the central portion of the disc space, to permanently indy the correct level and center. Then using hand-held retractors we dissected in a subperiosteal fashion the longus coli muscles off the anterior surface the spine.  We dissected sufficiently enough to create a plane under which the bladed self-retaining retractor could be securely placed. We then placed the appropriate size self-retaining retractors. At this point time we had excellent visualization of the anterior surface of the  C5- C7 levels.  We then had the  anesthesia team deflate and then re-inflate the ET cuff.  We then used the bur and small Martin to  garden  the anterior surface of the disc space to better accept a plate at the end of the case.  We then placed Kealia pins and a distractor.       Anterior Discectomy    We then used a 15 blade and created a large anterior annulectomy.  We then used a pituitary to remove the annular pieces as well as the loose nuclear material.  We then used curettes both straight and angled to preliminarily clean out the disc space, using a pituitary to remove the freed nuclear pulposis fragments as well as cartilaginous endplate. We then used a Kerrison to resect the inferior lip of the cephalad vertebral body. When we resected this to the point it was level with the central portion of the endplate, we then used the bur to smooth out the endplate from back to front.  We then used the bur to smooth out the other end plate in a similar fashion.  At this point in time we had excellent visualization of the posterior aspect of the disc space and planar endplates which had been curetted/burred to expose the cortical endplates.  I then used hazel to thin/take down the bilateral uncus and posterior bars that were readily approachable and needed to be removed.  We then used smaller straight and angled curettes to sequentially release the posterior annulus. When we had reached the level of the PLL, we used the nerve hook to create planes that we then developed and used a Kerrison to resect. This exposed the underlying ventral dura from foramen to foramen.  We then used the 1-2mm Kerrisons to resect the posterolateral aspects of the uncus and the entry zone of the foramen.  Removal of disc and uncus bilaterally allowed for complete decompression of the central canal and exiting nerve roots. To confirm this, I palpated into the foramen using a normal nerve hook and felt no residual compression.  At that point in time we obtained good hemostasis.   We then progressed to the grafting and instrumentation portion of the case.  We used the operative microscope to assist with visualization of the critical structures during the discectomy/decompression/interbody placement.        Insertion Biomechanical Interbody Device and Grafting    I trialed to identify the ideal cage height.  We then called for and placed the lordotically tapered cage.  We placed some DBM paste in the center of the cage to promote fusion.  We malleted the cage into place.  With all distraction released, the cage fit well and was snug.  At the end of the case we used the remaining DBM paste to fill the anterior and lateral recess to promote fusion.           Anterior Instrumentation    At this point in time we trialed plates. we selected the appropriate sized plate. We drilled screw holes and placed screws at each vertebra, the length of which was based on preoperative measures, intended to be long and unicortical.  All screws went in without problem and had good purchase.  The plate appeared flush and in good position. We palpated the anterior surface of the plate and there was no significant prominence.  At that time we obtained a final lateral fluoro shot.  We then removed the self-retaining retractors and confirmed good hemostasis and no evidence of esophageal injury.  We then moved the C-arm out and obtained a final AP shot.        Final Timeout:    Prior to closure, we verified that all needles, sponges and surgical instruments were accounted for.             Closure    We irrigated the wound copiously.  A drain was placed.  I then closed the wound in layers - 2-0 Vicryl in the platysma and subcuticular.  The skin was closed with 3-0 Monocryl. We placed steri-strips on the incision.  Then, we placed a sterile dressing and a cervical collar.  The patient was then awoken and extubated without problem in the OR and taken to the PACU in stable condition with the head of bed elevated.          VIJAY TAPIA MD

## 2023-05-22 NOTE — ANESTHESIA PROCEDURE NOTES
Airway       Patient location during procedure: OR       Procedure Start/Stop Times: 5/22/2023 7:52 AM  Staff -        Anesthesiologist:  Otis Morton MD       CRNA: Ignacia Agudelo       Other Anesthesia Staff: Tessa Ingram APRN CRNA       Performed By: SRNA  Consent for Airway        Urgency: elective  Indications and Patient Condition       Indications for airway management: anupama-procedural       Induction type:intravenous       Mask difficulty assessment: 2 - vent by mask + OA or adjuvant +/- NMBA    Final Airway Details       Final airway type: endotracheal airway       Successful airway: ETT - single  Endotracheal Airway Details        ETT size (mm): 7.0       Cuffed: yes       Successful intubation technique: video laryngoscopy       VL Blade Size: Glidescope 3       Grade View of Cords: 1       Adjucts: stylet       Position: Center       Measured from: gums/teeth       Secured at (cm): 21       Bite block used: Soft    Post intubation assessment        Placement verified by: capnometry, equal breath sounds and chest rise        Number of attempts at approach: 1       Number of other approaches attempted: 0       Secured with: pink tape       Ease of procedure: easy       Dentition: Intact and Unchanged    Medication(s) Administered   Medication Administration Time: 5/22/2023 7:52 AM

## 2023-05-23 ENCOUNTER — APPOINTMENT (OUTPATIENT)
Dept: GENERAL RADIOLOGY | Facility: CLINIC | Age: 73
End: 2023-05-23
Attending: STUDENT IN AN ORGANIZED HEALTH CARE EDUCATION/TRAINING PROGRAM
Payer: COMMERCIAL

## 2023-05-23 ENCOUNTER — APPOINTMENT (OUTPATIENT)
Dept: PHYSICAL THERAPY | Facility: CLINIC | Age: 73
End: 2023-05-23
Attending: STUDENT IN AN ORGANIZED HEALTH CARE EDUCATION/TRAINING PROGRAM
Payer: COMMERCIAL

## 2023-05-23 VITALS
DIASTOLIC BLOOD PRESSURE: 74 MMHG | OXYGEN SATURATION: 93 % | HEIGHT: 65 IN | BODY MASS INDEX: 32.02 KG/M2 | WEIGHT: 192.2 LBS | TEMPERATURE: 98 F | SYSTOLIC BLOOD PRESSURE: 132 MMHG | HEART RATE: 69 BPM | RESPIRATION RATE: 16 BRPM

## 2023-05-23 PROCEDURE — 999N000065 XR CERVICAL SPINE 2/3 VIEWS

## 2023-05-23 PROCEDURE — 250N000013 HC RX MED GY IP 250 OP 250 PS 637: Performed by: STUDENT IN AN ORGANIZED HEALTH CARE EDUCATION/TRAINING PROGRAM

## 2023-05-23 PROCEDURE — 97530 THERAPEUTIC ACTIVITIES: CPT | Mod: GP

## 2023-05-23 PROCEDURE — 250N000011 HC RX IP 250 OP 636: Performed by: STUDENT IN AN ORGANIZED HEALTH CARE EDUCATION/TRAINING PROGRAM

## 2023-05-23 PROCEDURE — 97161 PT EVAL LOW COMPLEX 20 MIN: CPT | Mod: GP

## 2023-05-23 RX ORDER — OXYCODONE HYDROCHLORIDE 5 MG/1
5 TABLET ORAL EVERY 6 HOURS PRN
Qty: 25 TABLET | Refills: 0 | Status: SHIPPED | OUTPATIENT
Start: 2023-05-23 | End: 2023-05-29

## 2023-05-23 RX ORDER — GABAPENTIN 300 MG/1
300 CAPSULE ORAL 3 TIMES DAILY
Qty: 60 CAPSULE | Refills: 0 | Status: SHIPPED | OUTPATIENT
Start: 2023-05-23

## 2023-05-23 RX ORDER — METHOCARBAMOL 500 MG/1
500 TABLET, FILM COATED ORAL 4 TIMES DAILY PRN
Qty: 60 TABLET | Refills: 0 | Status: SHIPPED | OUTPATIENT
Start: 2023-05-23

## 2023-05-23 RX ORDER — AMOXICILLIN 250 MG
1 CAPSULE ORAL DAILY
Qty: 30 TABLET | Refills: 0 | Status: SHIPPED | OUTPATIENT
Start: 2023-05-23

## 2023-05-23 RX ADMIN — CLINDAMYCIN PHOSPHATE 900 MG: 900 INJECTION, SOLUTION INTRAVENOUS at 05:31

## 2023-05-23 RX ADMIN — ACETAMINOPHEN 975 MG: 325 TABLET ORAL at 05:39

## 2023-05-23 RX ADMIN — BUPROPION HYDROCHLORIDE 150 MG: 150 TABLET, FILM COATED, EXTENDED RELEASE ORAL at 09:23

## 2023-05-23 RX ADMIN — AMLODIPINE BESYLATE 5 MG: 5 TABLET ORAL at 09:23

## 2023-05-23 RX ADMIN — FLUOXETINE 60 MG: 20 CAPSULE ORAL at 09:23

## 2023-05-23 ASSESSMENT — ACTIVITIES OF DAILY LIVING (ADL)
ADLS_ACUITY_SCORE: 39
ADLS_ACUITY_SCORE: 39
ADLS_ACUITY_SCORE: 41
ADLS_ACUITY_SCORE: 39
ADLS_ACUITY_SCORE: 41
ADLS_ACUITY_SCORE: 39
ADLS_ACUITY_SCORE: 39
ADLS_ACUITY_SCORE: 41

## 2023-05-23 NOTE — PLAN OF CARE
Physical Therapy Discharge Summary    Reason for therapy discharge:    All goals and outcomes met, no further needs identified.    Progress towards therapy goal(s). See goals on Care Plan in [facility] electronic health record for goal details.  Goals met    Therapy recommendation(s):    No further therapy is recommended.

## 2023-05-23 NOTE — DISCHARGE INSTRUCTIONS
Dr. Noe Post-operative instructions for Anterior Cervical Surgery  COLLAR  You will be given a hard cervical collar to wear postoperatively.  You must wear the hard collar  at all times. In the shower you will wear a soft collar that will be given to  you prior to discharge, or you can exchange the padding of your Aspen collar. You will wear the hard cervical collar until your first follow up which will  be between 2-3 weeks after surgery.    SHOWERING  Do not shower until 1 week after surgery. At that point, you may shower but no direct water on  the surgical incision. Make sure to dry the incision and cover with dry gauze. No tub baths, hot  tubs, or a whirlpool until your wound is completely healed at approximately 6-8 weeks.      EXERCISE  You have unlimited walking and stair climbing privileges.  Walking outside (in nice weather only)  or walking on a treadmill (no incline) is also allowed. Do NOT lift anything weighing greater than  10lbs.  Avoid bending, twisting, or lifting until the first follow up appointment at 3-6 weeks.    INCISION  After the first week change dressing 1-2 times days per day. Please check your incision at least twice daily for  signs and symptoms of infection:  Please make sure to check your incision at least twice daily for signs and symptoms of infection:  If any of the below should occur, please call the office   -Drainage from incisional site  -Opening of incision  -Fevers greater than 101  -Flu-like symptoms  -Increased redness and/or tenderness  -If you have staples or sutures (not steri-strips) in your incision they may be removed 2 weeks  following your surgery.  This may be done by a visiting nurse, family physician or by making an  appointment to come into the office.    SLEEPING  You may sleep in any position that makes you comfortable as long as your collar is securely in  place.  Many patients find comfort sleeping in a recliner chair.  It is normal to have  difficulty  sleeping for the first several weeks following your surgery.  We recommend trying Benadryl or  Tylenol PM (both are over the counter drugs at the drugstore).    EATING  It is normal to have a sore throat and some difficulty swallowing solid foods after this  surgery. This may persist for several weeks. We recommend cutting food into small bites and  eating soft foods. Popsicles may help soothe the throat.    PAIN  Do NOT take any anti-inflammatory medication (Advil, Aleve, Motrin, Ibuprofen) for the first ten  weeks following your surgery as this can affect bone healing.   Tylenol is an approved pain medication to be used after surgery, but do not exceed  4000mg/day.  To help alleviate soreness in neck or between the shoulder blades, you do small shoulder rolls  or apply ice or warm moist compresses around the area (NOT OVER THE INCISION). Other  over the counter options are Biofreeze, icy hot, or lidocaine patches (NOT OVER THE  INCISION). It is normal for this pain to persist for several weeks following your surgery. Your  activity should consist of walking.    DRIVING  You may NOT drive a car until completely off narcotic pain medications. You may not drive  while wearing a cervical collar as it is illegal. You may be a passenger in a car.  If you must take  a longer trip, make sure to make stops approximately every 1.5-2 hours so that you can walk  around and stretch your legs to prevent blood clots.  Reclining in the passenger seat may be the  most comfortable position.    FOLLOW-UP    You will have a follow-up appointment in approximately 2-3 weeks from surgery with x-rays.

## 2023-05-23 NOTE — PROGRESS NOTES
"Progress Note    Post-operative Day: 1 Day Post-Op    Procedure(s):  CERVICAL 5 TO CERVICAL 6 ANTERIOR CERVICAL DISCECTOMY AND FUSION WITH CERVICAL 6 TO CERVICAL 7 ANTERIOR CERVICAL DISCECTOMY AND FUSION      Subjective:  Pain adequately controlled this morning.  Has been able to tolerate full liquids.  Reports no significant discomfort.  Reports expected swallowing difficulty.  Improvement in preoperative symptoms.  No acute events overnight.    Objective:  Blood pressure 126/78, pulse 81, temperature 98.1  F (36.7  C), temperature source Oral, resp. rate 16, height 1.651 m (5' 5\"), weight 87.2 kg (192 lb 3.2 oz), SpO2 92 %.    Patient Vitals for the past 24 hrs:   BP Temp Temp src Pulse Resp SpO2   05/23/23 0037 126/78 98.1  F (36.7  C) Oral 81 16 92 %   05/22/23 2148 139/74 98.2  F (36.8  C) Oral 87 18 95 %   05/22/23 1558 (!) 144/87 -- -- -- -- --   05/22/23 1445 (!) 143/84 -- -- 104 18 94 %   05/22/23 1414 (!) 147/83 98  F (36.7  C) Axillary 97 19 95 %   05/22/23 1300 134/70 -- -- 100 19 93 %   05/22/23 1245 139/67 -- -- 97 14 94 %   05/22/23 1230 139/78 -- -- 96 16 94 %   05/22/23 1215 136/83 -- -- 101 14 90 %   05/22/23 1211 (!) 145/84 -- -- 101 10 98 %   05/22/23 1200 -- 98.2  F (36.8  C) Temporal 106 13 97 %       Wt Readings from Last 4 Encounters:   05/22/23 87.2 kg (192 lb 3.2 oz)   01/03/23 81.6 kg (180 lb)   11/14/22 81.6 kg (180 lb)   11/01/22 84.5 kg (186 lb 3.2 oz)         Motor function, sensation, and circulation intact   Yes  Wound status: incisions are clean dry and intact. Yes  Dressing saturated  Drain holding suction    Pertinent Labs   Lab Results: personally reviewed.     Recent Labs   Lab Test 10/15/22  5027 10/14/22  0736 10/13/22  0737 10/12/22  0749 10/11/22  0609 08/06/19  1628 06/03/19  0654   INR  --   --   --   --   --   --  0.99   HGB 13.1 13.0  --   --  12.2   < > 13.3   HCT 40.1 39.8  --   --  38.2   < > 39.8   MCV 96 97  --   --  98   < > 91   * 136*  --   --  171   < > " 315   NA  --  134 136 135 137   < > 140    < > = values in this interval not displayed.       Plan:   1.Anticoagulation protocol: Mechanical and ambulation  2.Pain medications: Current regimen  3.Weight bearing status: Weightbearing as tolerated, activity to tolerance and restrictions per instructions  4.Disposition: Later today  5.Continue cares and rehabilitation   6.  Encourage mobilization  7.  Awaiting postoperative radiographs, can be discharged after images    Report completed by:  VIJAY TAPIA MD  Date: 5/23/2023  Time: 7:40 AM

## 2023-05-23 NOTE — PLAN OF CARE
05/23/23 1019   Appointment Info   Signing Clinician's Name / Credentials (PT) Mary Ortiz DPT   Living Environment   People in Home alone   Current Living Arrangements house   Transportation Anticipated family or friend will provide   Living Environment Comments brother staying with pt at [place]   Self-Care   Usual Activity Tolerance good   Current Activity Tolerance moderate   Activity/Exercise/Self-Care Comment IND at baseline, has FWW she will use at [place]   General Information   Onset of Illness/Injury or Date of Surgery 05/22/23   Referring Physician Ricardo Noe MD   Pertinent History of Current Problem (include personal factors and/or comorbidities that impact the POC) CERVICAL 5 TO CERVICAL 6 ANTERIOR CERVICAL DISCECTOMY AND FUSION WITH CERVICAL 6 TO CERVICAL 7 ANTERIOR CERVICAL DISCECTOMY AND FUSION   Existing Precautions/Restrictions spinal   General Observations Aspen collar on   Cognition   Affect/Mental Status (Cognition) WFL   Pain Assessment   Patient Currently in Pain No   Range of Motion (ROM)   ROM Comment WFL   Strength (Manual Muscle Testing)   Strength Comments WFL   Bed Mobility   Comment, (Bed Mobility) Logroll, IND   Transfers   Comment, (Transfers) STS FWW SBA   Gait/Stairs (Locomotion)   Comment, (Gait/Stairs) 2' eval FWW reciprocal pattern downward gaze no LOB   Balance   Balance Comments Good dynamic balance with use of FWW   Clinical Impression   Criteria for Skilled Therapeutic Intervention Yes, treatment indicated   PT Diagnosis (PT) impaired IND with mobility from baseline   Influenced by the following impairments Gross functional weakness, impaired high level balance   Clinical Presentation (PT Evaluation Complexity) Stable/Uncomplicated   Clinical Decision Making (Complexity) low complexity   Planned Therapy Interventions (PT) cryotherapy;gait training;home exercise program;patient/family education;transfer training;stair training   Risk & Benefits of therapy have been explained  evaluation/treatment results reviewed;care plan/treatment goals reviewed;risks/benefits reviewed;patient   PT Total Evaluation Time   PT Eval, Low Complexity Minutes (94063) 10   Plan of Care Review   Plan of Care Reviewed With patient   Therapy Certification   Start of care date 05/22/23   Certification date from 05/23/23   Certification date to 05/23/23   Medical Diagnosis Cx spine fusion see chart for details   Physical Therapy Goals   PT Frequency One time eval and treatment only   PT Predicted Duration/Target Date for Goal Attainment 05/23/23   PT Goals Bed Mobility;Transfers;Gait;Stairs   PT: Bed Mobility Independent;Supine to/from sit;Rolling;Within precautions   PT: Transfers Modified independent;Sit to/from stand;Bed to/from chair;Assistive device;Within precautions   PT: Gait Modified independent;Rolling walker;50 feet;Supervision/stand-by assist;Greater than 200 feet   PT: Stairs Supervision/stand-by assist;Greater than 10 stairs;Rail on both sides   Interventions   Interventions Quick Adds Gait Training;Therapeutic Activity   Therapeutic Activity   Therapeutic Activities: dynamic activities to improve functional performance Minutes (66653) 15   Treatment Detail/Skilled Intervention Pt supine upon arrival. C collar on. Provided with proper posture handout. Reviewed proper dressing techniques, maintaining C spine precautions with ADLs. Pt verbalized understanding. Pt familiar with logroll technique, demo IND with supine > sitting. SBA STS no LOB. Use of FWW for stability today. Baseline L knee pain, needs TKA in near future, no pain this AM. Focus on functional mobility, pt engaged in hallway ambulation with ' tolerated well. Up/down 4 steps, SBA safe ft placement no LOB. Reviewed balancing rest with activity. Placing items at home within reach. Pt verbalized understanding. pt has met PT goals. Sit > supine IND end of session   PT Discharge Planning   PT Plan DC   PT Discharge Recommendation (DC  Rec) home with assist   PT Rationale for DC Rec Pt progressed to IND-mod I with mobility, Cspine precautions maintained. C collar on during session. Pt tolerated therapy well. No further IP needs   PT Brief overview of current status IND-mod I FWW   Total Session Time   Timed Code Treatment Minutes 15   Total Session Time (sum of timed and untimed services) 25         M [facility]  OUTPATIENT PHYSICAL THERAPY EVALUATION  PLAN OF TREATMENT FOR OUTPATIENT REHABILITATION  (COMPLETE FOR INITIAL CLAIMS ONLY)  Patient's Last Name, First Name, M.I.  YOB: 1950  Leonor Bernstein                        Provider's Name  [facility] Medical Record No.  [id number]                             Onset Date:  05/22/23   Start of Care Date:  05/22/23   Type:     _X_PT   ___OT   ___SLP Medical Diagnosis:  Cx spine fusion see chart for details              PT Diagnosis:  impaired IND with mobility from baseline Visits from SOC:  1     See note for plan of treatment, functional goals and certification details    I CERTIFY THE NEED FOR THESE SERVICES FURNISHED UNDER        THIS PLAN OF TREATMENT AND WHILE UNDER MY CARE     (Physician co-signature of this document indicates review and certification of the therapy plan).

## 2023-05-23 NOTE — PROGRESS NOTES
Patient vital signs are at baseline: Yes  Patient able to ambulate as they were prior to admission or with assist devices provided by therapies during their stay:  Yes  Patient MUST void prior to discharge:  Yes  Patient able to tolerate oral intake:  Yes  Pain has adequate pain control using Oral analgesics: Yes  Does patient have an identified :  N/A  Has goal D/C date and time been discussed with patient:  Yes    Alert and oriented x 4, VSS, medically stable upon discharge. Denies any pain, did not take any pain medication this shift. PVR 0, was able to void comfortably. Incision dressing clear and dry. Discharge insturction and medication provided to patient. Pt verbally understood the instructions and nurse clarified any questions she had. Discharged at 3pm, was picked up by brother.

## 2023-05-23 NOTE — PROGRESS NOTES
Patient vital signs are at baseline Yes  Patient able to ambulate as they were prior to admission or with assist devices provided by therapies during their stay:  Not out of bed this shift  Patient MUST void prior to discharge: Johnson removed, Pt due to void  Patient able to tolerate oral intake:  Yes  Pain has adequate pain control using Oral analgesics:  Yes  Does patient have an identified :Yes  Has goal D/C date and time been discussed with patient:  TBD    Pt is alert and oriented x 4, VSS on RA, PIV SL, Pt has a c collar covering anterior neck incision. CMS intact, dressing intact, denied having any pain this shift. Pt also has a left side RAJI drain that is not suctioning. Provider is aware . Johnson cath was removed @05:45. Pt is due to void. Discharge pending

## 2023-05-23 NOTE — PROGRESS NOTES
Pt is A&Ox4, VSS, CMS intact. Pt anterioris neck incision with moderate drainage, marked. Pt is tolerating full liquid diet and oral medication. Denies nausea and vomiting. Johnson in place, draining adequately. Denies pain. RAJI drainage in place, not suctioning adequately, surgeon is aware.

## 2023-09-02 ENCOUNTER — HEALTH MAINTENANCE LETTER (OUTPATIENT)
Age: 73
End: 2023-09-02

## 2023-11-11 ENCOUNTER — HEALTH MAINTENANCE LETTER (OUTPATIENT)
Age: 73
End: 2023-11-11

## 2024-10-26 ENCOUNTER — HEALTH MAINTENANCE LETTER (OUTPATIENT)
Age: 74
End: 2024-10-26

## 2025-07-08 NOTE — ANESTHESIA CARE TRANSFER NOTE
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    Anesthesia Type:   General     Note:    Oropharynx: oropharynx clear of all foreign objects and spontaneously breathing  Level of Consciousness: awake  Oxygen Supplementation: face mask  Level of Supplemental Oxygen (L/min / FiO2): 6  Independent Airway: airway patency satisfactory and stable  Dentition: dentition unchanged  Vital Signs Stable: post-procedure vital signs reviewed and stable  Report to RN Given: handoff report given  Patient transferred to: PACU    Handoff Report: Identifed the Patient, Identified the Reponsible Provider, Reviewed the pertinent medical history, Discussed the surgical course, Reviewed Intra-OP anesthesia mangement and issues during anesthesia, Set expectations for post-procedure period and Allowed opportunity for questions and acknowledgement of understanding      Vitals:  Vitals Value Taken Time   /129 05/22/23 1200   Temp     Pulse 104 05/22/23 1205   Resp 6 05/22/23 1205   SpO2 97 % 05/22/23 1205   Vitals shown include unvalidated device data.    Electronically Signed By: JACQUE Batista CRNA  May 22, 2023  12:07 PM   

## 2025-07-27 ENCOUNTER — HOSPITAL ENCOUNTER (EMERGENCY)
Facility: CLINIC | Age: 75
Discharge: HOME | End: 2025-07-27
Attending: EMERGENCY MEDICINE
Payer: COMMERCIAL

## 2025-07-27 ENCOUNTER — APPOINTMENT (OUTPATIENT)
Dept: CT IMAGING | Facility: CLINIC | Age: 75
End: 2025-07-27
Attending: EMERGENCY MEDICINE
Payer: COMMERCIAL

## 2025-07-27 VITALS
RESPIRATION RATE: 16 BRPM | SYSTOLIC BLOOD PRESSURE: 124 MMHG | OXYGEN SATURATION: 95 % | DIASTOLIC BLOOD PRESSURE: 66 MMHG | HEART RATE: 68 BPM | BODY MASS INDEX: 31.65 KG/M2 | WEIGHT: 190 LBS | TEMPERATURE: 97.4 F | HEIGHT: 65 IN

## 2025-07-27 DIAGNOSIS — N20.0 NEPHROLITHIASIS: ICD-10-CM

## 2025-07-27 DIAGNOSIS — N28.1 RENAL CYST: ICD-10-CM

## 2025-07-27 DIAGNOSIS — N30.91 HEMORRHAGIC CYSTITIS: Primary | ICD-10-CM

## 2025-07-27 DIAGNOSIS — E86.0 DEHYDRATION: ICD-10-CM

## 2025-07-27 LAB
ALBUMIN UR-MCNC: 10 MG/DL
AMORPH CRY #/AREA URNS HPF: ABNORMAL /HPF
ANION GAP SERPL CALCULATED.3IONS-SCNC: 12 MMOL/L (ref 7–15)
APPEARANCE UR: ABNORMAL
BACTERIA #/AREA URNS HPF: ABNORMAL /HPF
BASOPHILS # BLD AUTO: 0 10E3/UL (ref 0–0.2)
BASOPHILS NFR BLD AUTO: 1 %
BILIRUB UR QL STRIP: NEGATIVE
BUN SERPL-MCNC: 16 MG/DL (ref 8–23)
CALCIUM SERPL-MCNC: 8.7 MG/DL (ref 8.8–10.4)
CHLORIDE SERPL-SCNC: 103 MMOL/L (ref 98–107)
COLOR UR AUTO: ABNORMAL
CREAT SERPL-MCNC: 1.09 MG/DL (ref 0.51–0.95)
EGFRCR SERPLBLD CKD-EPI 2021: 53 ML/MIN/1.73M2
EOSINOPHIL # BLD AUTO: 0.2 10E3/UL (ref 0–0.7)
EOSINOPHIL NFR BLD AUTO: 2 %
ERYTHROCYTE [DISTWIDTH] IN BLOOD BY AUTOMATED COUNT: 13.4 % (ref 10–15)
GLUCOSE SERPL-MCNC: 88 MG/DL (ref 70–99)
GLUCOSE UR STRIP-MCNC: NEGATIVE MG/DL
HCO3 SERPL-SCNC: 25 MMOL/L (ref 22–29)
HCT VFR BLD AUTO: 41.6 % (ref 35–47)
HGB BLD-MCNC: 13.8 G/DL (ref 11.7–15.7)
HGB UR QL STRIP: ABNORMAL
HYALINE CASTS: 9 /LPF
IMM GRANULOCYTES # BLD: 0 10E3/UL
IMM GRANULOCYTES NFR BLD: 0 %
KETONES UR STRIP-MCNC: NEGATIVE MG/DL
LEUKOCYTE ESTERASE UR QL STRIP: ABNORMAL
LYMPHOCYTES # BLD AUTO: 1.1 10E3/UL (ref 0.8–5.3)
LYMPHOCYTES NFR BLD AUTO: 15 %
MCH RBC QN AUTO: 31.4 PG (ref 26.5–33)
MCHC RBC AUTO-ENTMCNC: 33.2 G/DL (ref 31.5–36.5)
MCV RBC AUTO: 95 FL (ref 78–100)
MONOCYTES # BLD AUTO: 0.7 10E3/UL (ref 0–1.3)
MONOCYTES NFR BLD AUTO: 9 %
MUCOUS THREADS #/AREA URNS LPF: PRESENT /LPF
NEUTROPHILS # BLD AUTO: 5.5 10E3/UL (ref 1.6–8.3)
NEUTROPHILS NFR BLD AUTO: 73 %
NITRATE UR QL: NEGATIVE
NRBC # BLD AUTO: 0 10E3/UL
NRBC BLD AUTO-RTO: 0 /100
PH UR STRIP: 7 [PH] (ref 5–7)
PLATELET # BLD AUTO: 287 10E3/UL (ref 150–450)
POTASSIUM SERPL-SCNC: 3.9 MMOL/L (ref 3.4–5.3)
RBC # BLD AUTO: 4.39 10E6/UL (ref 3.8–5.2)
RBC URINE: 37 /HPF
SODIUM SERPL-SCNC: 140 MMOL/L (ref 135–145)
SP GR UR STRIP: 1.01 (ref 1–1.03)
SQUAMOUS EPITHELIAL: 2 /HPF
UROBILINOGEN UR STRIP-MCNC: NORMAL MG/DL
WBC # BLD AUTO: 7.6 10E3/UL (ref 4–11)
WBC CLUMPS #/AREA URNS HPF: PRESENT /HPF
WBC URINE: 161 /HPF

## 2025-07-27 PROCEDURE — 80048 BASIC METABOLIC PNL TOTAL CA: CPT | Performed by: EMERGENCY MEDICINE

## 2025-07-27 PROCEDURE — 85025 COMPLETE CBC W/AUTO DIFF WBC: CPT | Performed by: EMERGENCY MEDICINE

## 2025-07-27 PROCEDURE — 36415 COLL VENOUS BLD VENIPUNCTURE: CPT | Performed by: EMERGENCY MEDICINE

## 2025-07-27 PROCEDURE — 250N000011 HC RX IP 250 OP 636: Performed by: EMERGENCY MEDICINE

## 2025-07-27 PROCEDURE — 87086 URINE CULTURE/COLONY COUNT: CPT | Performed by: EMERGENCY MEDICINE

## 2025-07-27 PROCEDURE — 258N000003 HC RX IP 258 OP 636: Performed by: EMERGENCY MEDICINE

## 2025-07-27 PROCEDURE — 96361 HYDRATE IV INFUSION ADD-ON: CPT

## 2025-07-27 PROCEDURE — 96375 TX/PRO/DX INJ NEW DRUG ADDON: CPT

## 2025-07-27 PROCEDURE — 74176 CT ABD & PELVIS W/O CONTRAST: CPT

## 2025-07-27 PROCEDURE — 81001 URINALYSIS AUTO W/SCOPE: CPT | Performed by: EMERGENCY MEDICINE

## 2025-07-27 PROCEDURE — 96365 THER/PROPH/DIAG IV INF INIT: CPT

## 2025-07-27 PROCEDURE — 99285 EMERGENCY DEPT VISIT HI MDM: CPT | Mod: 25 | Performed by: EMERGENCY MEDICINE

## 2025-07-27 RX ORDER — CEFTRIAXONE 2 G/1
2 INJECTION, POWDER, FOR SOLUTION INTRAMUSCULAR; INTRAVENOUS ONCE
Status: COMPLETED | OUTPATIENT
Start: 2025-07-27 | End: 2025-07-27

## 2025-07-27 RX ORDER — HYDROMORPHONE HYDROCHLORIDE 1 MG/ML
0.5 INJECTION, SOLUTION INTRAMUSCULAR; INTRAVENOUS; SUBCUTANEOUS ONCE
Status: DISCONTINUED | OUTPATIENT
Start: 2025-07-27 | End: 2025-07-27 | Stop reason: HOSPADM

## 2025-07-27 RX ORDER — KETOROLAC TROMETHAMINE 15 MG/ML
15 INJECTION, SOLUTION INTRAMUSCULAR; INTRAVENOUS ONCE
Status: COMPLETED | OUTPATIENT
Start: 2025-07-27 | End: 2025-07-27

## 2025-07-27 RX ORDER — CEFDINIR 300 MG/1
300 CAPSULE ORAL 2 TIMES DAILY
Qty: 14 CAPSULE | Refills: 0 | Status: SHIPPED | OUTPATIENT
Start: 2025-07-27 | End: 2025-08-03

## 2025-07-27 RX ADMIN — CEFTRIAXONE SODIUM 2 G: 2 INJECTION, POWDER, FOR SOLUTION INTRAMUSCULAR; INTRAVENOUS at 11:48

## 2025-07-27 RX ADMIN — KETOROLAC TROMETHAMINE 15 MG: 15 INJECTION, SOLUTION INTRAMUSCULAR; INTRAVENOUS at 11:14

## 2025-07-27 RX ADMIN — SODIUM CHLORIDE 1000 ML: 0.9 INJECTION, SOLUTION INTRAVENOUS at 09:50

## 2025-07-27 ASSESSMENT — COLUMBIA-SUICIDE SEVERITY RATING SCALE - C-SSRS
1. IN THE PAST MONTH, HAVE YOU WISHED YOU WERE DEAD OR WISHED YOU COULD GO TO SLEEP AND NOT WAKE UP?: NO
6. HAVE YOU EVER DONE ANYTHING, STARTED TO DO ANYTHING, OR PREPARED TO DO ANYTHING TO END YOUR LIFE?: NO
2. HAVE YOU ACTUALLY HAD ANY THOUGHTS OF KILLING YOURSELF IN THE PAST MONTH?: NO

## 2025-07-27 ASSESSMENT — ACTIVITIES OF DAILY LIVING (ADL)
ADLS_ACUITY_SCORE: 53

## 2025-07-27 NOTE — ED PROVIDER NOTES
"  Emergency Department Note      History of Present Illness     Chief Complaint   Flank Pain      HPI   Leonor Bernstein is a 75 year old female with history of anemia, stage 3 CKD, hemorrhoids, hypertension, osteoporosis, and kidney stones who presents for evaluation of flank pain. Patient reports that 5 days ago on 7/22 at night she began experiencing right mid to low back pain that has continued since with intermittent radiation to the flanks bilaterally and across the abdomen. She has history of a cholecystectomy and lumbar surgery. She also notes history of a kidney stone in 2022 with the same presentation of pain as she is currently experiencing for which she had to undergo a cystoscopy stone removal. Patient denies experiencing recent fever or injuries.      Independent Historian   None    Review of External Notes       Past Medical History     Medical History and Problem List   Anemia  Arthritis  Stage 3 CKD  Depression  GERD  Hemorrhoids  Hypertension  Mumps  Obesity  Osteoporosis  PONV  Restless legs syndrome  Obstructive sleep apnea  Right sciatica  Vitamin deficiency       Medications   Amlodipine   Wellbutrin  Cleocin  Cyanocobalamin  Fluoxetine  Gabapentin   Robaxin  Senokot      Surgical History   Right total knee arthroplasty  Cholecystectomy  Colonoscopy  Cystoscopy, right retrograde pyelogram with right ureteral stent insertion  Decompression lumbar two levels  Discectomy, fusion cervical anterior two levels, combined  Gastric bypass  Tubal ligation  Laminectomy lumbar one level, bilateral L3-4 decompression  Optical tracking system fusion spine posterior lumbar three plus levels  Right total hip arthroplasty      Physical Exam     Patient Vitals for the past 24 hrs:   BP Temp Temp src Pulse Resp SpO2 Height Weight   07/27/25 1100 (!) 149/76 -- -- 72 -- 96 % -- --   07/27/25 1000 138/69 -- -- 71 -- 94 % -- --   07/27/25 0934 (!) 150/85 97.4  F (36.3  C) Temporal 83 18 97 % 1.651 m (5' 5\") 86.2 kg " (190 lb)     Physical Exam  General: Resting uncomfortably on the gurney, the pain is worse with movement  Head:  The scalp, face, and head appear normal  Eyes:  The pupils are equal, round, and reactive to light    There is no nystagmus    Extraocular muscles are intact    Conjunctivae and sclerae are normal  ENT:    The nose is normal    Pinnae are normal    The oropharynx is normal  Neck:  Normal range of motion    There is no rigidity noted  CV:  Normal rate  Normal rhythm     Normal S1/S2    No pathological murmur detected  Resp:  Lungs are clear    There is no tachypnea    Non-labored    No rales    No wheezing   GI:  Abdomen is soft, there is no rigidity    No distension    No rebound tenderness     Non-surgical without peritoneal features  MS:  Normal muscular tone    Symmetric motor strength  Well-healed surgical scar to the lumbar spine from the fusion.  No CVA tenderness.  Skin:  No shingles.  Neuro:  Speech is normal and fluent, there is no aphasia    No motor deficits    Cranial nerves are intact  Psych: Awake. Alert.      Normal affect      Diagnostics     Lab Results   Labs Ordered and Resulted from Time of ED Arrival to Time of ED Departure   BASIC METABOLIC PANEL (LIMITED OCCURRENCES) - Abnormal       Result Value    Sodium 140      Potassium 3.9      Chloride 103      Carbon Dioxide (CO2) 25      Anion Gap 12      Urea Nitrogen 16.0      Creatinine 1.09 (*)     GFR Estimate 53 (*)     Calcium 8.7 (*)     Glucose 88     ROUTINE UA WITH MICROSCOPIC REFLEX TO CULTURE - Abnormal    Color Urine Orange (*)     Appearance Urine Cloudy (*)     Glucose Urine Negative      Bilirubin Urine Negative      Ketones Urine Negative      Specific Gravity Urine 1.009      Blood Urine Small (*)     pH Urine 7.0      Protein Albumin Urine 10 (*)     Urobilinogen Urine Normal      Nitrite Urine Negative      Leukocyte Esterase Urine Large (*)     Bacteria Urine Many (*)     WBC Clumps Urine Present (*)     Mucus Urine  Present (*)     Amorphous Crystals Urine Few (*)     RBC Urine 37 (*)     WBC Urine 161 (*)     Squamous Epithelials Urine 2 (*)     Hyaline Casts Urine 9 (*)    CBC WITH PLATELETS AND DIFFERENTIAL    WBC Count 7.6      RBC Count 4.39      Hemoglobin 13.8      Hematocrit 41.6      MCV 95      MCH 31.4      MCHC 33.2      RDW 13.4      Platelet Count 287      % Neutrophils 73      % Lymphocytes 15      % Monocytes 9      % Eosinophils 2      % Basophils 1      % Immature Granulocytes 0      NRBCs per 100 WBC 0      Absolute Neutrophils 5.5      Absolute Lymphocytes 1.1      Absolute Monocytes 0.7      Absolute Eosinophils 0.2      Absolute Basophils 0.0      Absolute Immature Granulocytes 0.0      Absolute NRBCs 0.0     URINE CULTURE       Imaging   CT Abdomen Pelvis w/o Contrast   Final Result   IMPRESSION:    1.  No hydronephrosis or obstructing calculi.   2.  Couple nonobstructive right renal calculi.   3.  No other inflammatory changes or acute findings within limitations of noncontrast exam.   4.  Moderate age-indeterminate L1 compression fracture, favor chronic but new since 2022. Correlate for any acute pain/tenderness.                ED Course      Medications Administered   Medications   HYDROmorphone (PF) (DILAUDID) injection 0.5 mg (has no administration in time range)   sodium chloride 0.9% BOLUS 1,000 mL (0 mLs Intravenous Stopped 7/27/25 1115)   ketorolac (TORADOL) injection 15 mg (15 mg Intravenous $Given 7/27/25 1114)   cefTRIAXone (ROCEPHIN) 2 g vial to attach to  ml bag for ADULTS or NS 50 ml bag for PEDS (2 g Intravenous $New Bag 7/27/25 1148)       Procedures   Procedures     Discussion of Management   None    ED Course   ED Course as of 07/27/25 1223   Sun Jul 27, 2025   0940 I obtained patient history and performed a physical exam.    1128 I reassessed patient who is feeling better after medications.         Additional Documentation  None    Medical Decision Making / Diagnosis     CMS  Diagnoses: IV Antibiotics given and/or elevated Lactate of 0 and no sepsis note found - Delete this reminder and enter the sepsis note or '.edcms' before signing chart.>>>None    MIPS   None               [facility]   Leonor Bernstein is a 75 year old female presents to the emergency department with flank discomfort as noted above.  This has been present for several days.  The patient does have a history of kidney stones.  She underwent CT scan of the abdomen which shows no acute ureteral stones she does have some nephrolithiasis with some small stones in the right kidney and some bilateral renal cyst which are stable.  No other acute life-threatening etiology was noted.  Urinalysis shows significant hemorrhagic cystitis with also dehydration.  Patient was given intravenous ceftriaxone to help jumpstart therapy and she will be started on a third-generation cephalosporin for the next week.  The differential diagnosis includes cystitis versus some mild ureteral inflammation causing back pain, versus the possibility of subtle/early pyelonephritis which appears less likely given the patient's laboratory analysis and CT findings.    Disposition   The patient was discharged.     Diagnosis     ICD-10-CM    1. Hemorrhagic cystitis  N30.91       2. Dehydration  E86.0       3. Nephrolithiasis  N20.0       4. Renal cyst  N28.1            Discharge Medications   New Prescriptions    CEFDINIR (OMNICEF) 300 MG CAPSULE    Take 1 capsule (300 mg) by mouth 2 times daily for 7 days.         Scribe Disclosure:  Renita FLROENTINO, am serving as a scribe at 9:44 AM on 7/27/2025 to document services personally performed by Paddy Fernandes MD based on my observations and the provider's statements to me.        Paddy Fernandes MD  07/27/25 [id number]

## 2025-07-27 NOTE — DISCHARGE INSTRUCTIONS
Please take the antibiotic that I have prescribed for 7 days.  You may use Tylenol or ibuprofen as needed for pain.  Return to the emergency department for recurrent pain, ongoing fevers, chills, or urinary symptoms.

## 2025-07-28 LAB — BACTERIA UR CULT: NORMAL

## (undated) DEVICE — GLOVE BIOGEL PI MICRO INDICATOR UNDERGLOVE SZ 7.0 48970

## (undated) DEVICE — SOL WATER IRRIG 3000ML BAG 2B7117

## (undated) DEVICE — HOOD FLYTE W/PEELAWAY 408-800-100

## (undated) DEVICE — DRSG GAUZE 4X4" 8044

## (undated) DEVICE — Device

## (undated) DEVICE — SPONGE SURGIFOAM 100 1974

## (undated) DEVICE — DRAPE IOBAN INCISE 23X17" 6650EZ

## (undated) DEVICE — GLOVE PROTEXIS POWDER FREE 8.0 ORTHOPEDIC 2D73ET80

## (undated) DEVICE — TOOL DISSECT MIDAS MR8 14CM MATCH HEAD 3MM MR8-14MH30

## (undated) DEVICE — GLOVE PROTEXIS W/NEU-THERA 7.5  2D73TE75

## (undated) DEVICE — GLOVE PROTEXIS BLUE W/NEU-THERA 7.0  2D73EB70

## (undated) DEVICE — TUBING SUCTION SOFT 20'X3/16" 0036570

## (undated) DEVICE — GLOVE BIOGEL PI MICRO SZ 7.0 48570

## (undated) DEVICE — MARKER SPHERES PASSIVE MEDT PACK 5 8801075

## (undated) DEVICE — BAG DRAIN URO FOR SIEMENS 8MM ADAPTER NS CC164NS-A

## (undated) DEVICE — PACK LARGE SPINE SNE15LSFSE

## (undated) DEVICE — GLOVE PROTEXIS BLUE W/NEU-THERA 8.0  2D73EB80

## (undated) DEVICE — PREP CHLORAPREP W/ORANGE TINT 10.5ML 260715

## (undated) DEVICE — BLADE BONE MILL STRK 3.2MM FINE 5400-702-000

## (undated) DEVICE — DRAPE MAYO STAND 23X54 8337

## (undated) DEVICE — GLOVE PROTEXIS W/NEU-THERA 7.0  2D73TE70

## (undated) DEVICE — SU VICRYL 3-0 PS-1 18" UND J683

## (undated) DEVICE — NDL 21GA 1.5"

## (undated) DEVICE — GLOVE PROTEXIS W/NEU-THERA 8.0  2D73TE80

## (undated) DEVICE — PIN PERCUTANEOUS NAVIGATION FOR SPINE O-ARM150MM 9733236

## (undated) DEVICE — TOOL DISSECT MIDAS MR8 14CM BALL DMD 3MM DIA MR8-14BA30D

## (undated) DEVICE — COVER FOOTSWITCH W/CINCH 20X24" 923267

## (undated) DEVICE — SU SILK 2-0 TIE 24" SA75H

## (undated) DEVICE — SU VICRYL 3-0 PS-2 18" UND J497G

## (undated) DEVICE — CUSHION INSERT LG PRONE VIEW JACKSON TABLE

## (undated) DEVICE — SU SILK 2-0 FSL 18" 677G

## (undated) DEVICE — COVER TABLE POLY 65X90" 8186

## (undated) DEVICE — SU VICRYL 2-0 CT-2 CR 8X18" J726D

## (undated) DEVICE — DRSG ADAPTIC 3X3" 6112

## (undated) DEVICE — UROSEAL ADJUSTABLE ENDOSCOPIC VALVE

## (undated) DEVICE — DRSG GAUZE 4X4" 3033

## (undated) DEVICE — RX SURGIFLO HEMOSTATIC MATRIX W/THROMBIN 8ML 2994

## (undated) DEVICE — SUCTION MANIFOLD NEPTUNE SGL

## (undated) DEVICE — MANIFOLD NEPTUNE 4 PORT 700-20

## (undated) DEVICE — NDL SPINAL 18GA 3.5" 405184

## (undated) DEVICE — PACK SPINE SM CUSTOM SNE15SSFSK

## (undated) DEVICE — FIBER LASER 200 UM DISPOSABLE TFL-FBX200S

## (undated) DEVICE — SOL WATER IRRIG 1000ML BOTTLE 2F7114

## (undated) DEVICE — SYR EAR BULB 3OZ 0035830

## (undated) DEVICE — SYR 01ML 27GA 0.5" NDL TBC 309623

## (undated) DEVICE — TUBING IRRIG TUR Y TYPE 96" LF 6543-01

## (undated) DEVICE — POSITIONER PT PRONESAFE HEAD REST W/DERMAPROX INSERT 40599

## (undated) DEVICE — PACK UNIVERSAL SPLIT 29131

## (undated) DEVICE — PIN DISTRACTION ANCHOR FOR SCR 14MM MDS9091414

## (undated) DEVICE — GLOVE PROTEXIS BLUE W/NEU-THERA 8.5  2D73EB85

## (undated) DEVICE — ESU ELEC BLADE 4" COATED

## (undated) DEVICE — BONE CEMENT MIXEVAC HI VAC W/CARTRIDGE 0306-563-000

## (undated) DEVICE — DRAIN JACKSON PRATT 07MM FLAT 3/4 PERF

## (undated) DEVICE — LINEN TOWEL PACK X5 5464

## (undated) DEVICE — RAD RX ISOVUE 300 (50ML) 61% IOPAMIDOL CHARGE PER ML

## (undated) DEVICE — SUCTION IRR SYSTEM W/O TIP INTERPULSE HANDPIECE 0210-100-000

## (undated) DEVICE — SU VICRYL 1 CTX CR 8X18" J765D

## (undated) DEVICE — PREP CHLORAPREP 26ML TINTED ORANGE  260815

## (undated) DEVICE — PREP SCRUB SOL EXIDINE 4% CHG 4OZ 29002-404

## (undated) DEVICE — BLADE PRECISION 25X1.27X9 6725127090

## (undated) DEVICE — DRAPE MICROSCOPE LEICA 54X150" AR8033650

## (undated) DEVICE — SPONGE COTTONOID 1/2X3" 80-1407

## (undated) DEVICE — DRAIN JACKSON PRATT RESERVOIR 100ML SU130-1305

## (undated) DEVICE — TAPE MEDIPORE 4"X10YD 2964

## (undated) DEVICE — STPL SKIN PROXIMATE 35 WIDE PMW35

## (undated) DEVICE — SOL NACL 0.9% IRRIG 1000ML BOTTLE 2F7124

## (undated) DEVICE — SUCTION FRAZIER 12FR W/HANDLE K73

## (undated) DEVICE — IOM SUPPLIES

## (undated) DEVICE — SOL NACL 0.9% IRRIG 3000ML BAG 2B7477

## (undated) DEVICE — GLOVE PROTEXIS BLUE W/NEU-THERA 6.5  2D73EB65

## (undated) DEVICE — NDL 30GA 0.5" 305106

## (undated) DEVICE — SPONGE COTTONOID 1/2X1/2" 80-1400

## (undated) DEVICE — SOLUTION WOUND CLEANSING 3/4OZ 10% PVP EA-L3011FB-50

## (undated) DEVICE — DRAPE STERI U 1015

## (undated) DEVICE — DRAPE STERI TOWEL SM 1000

## (undated) DEVICE — DRSG STERI STRIP 1/2X4" R1547

## (undated) DEVICE — PACK CYSTOSCOPY SBA15CYFSI

## (undated) DEVICE — SURGICEL HEMOSTAT 2X14" 1951

## (undated) DEVICE — ESU GROUND PAD UNIVERSAL W/O CORD

## (undated) DEVICE — GUIDEWIRE URO STR STIFF .035"X150CM NITINOL 150NSS35

## (undated) DEVICE — CAST PADDING 6" UNSTERILE 9046

## (undated) DEVICE — GOWN XXL 9575

## (undated) DEVICE — SYR 30ML LL W/O NDL 302832

## (undated) DEVICE — DRAPE COVER C-ARM SEAMLESS SNAP-KAP 03-KP26 LATEX FREE

## (undated) DEVICE — GLOVE PROTEXIS W/NEU-THERA 6.0  2D73TE60

## (undated) DEVICE — MIDAS REX DISSECTING TOOL  14MH30

## (undated) DEVICE — DRAPE STERI TOWEL LG 1010

## (undated) DEVICE — PAD CHUX UNDERPAD 30X36" P3036C

## (undated) DEVICE — NDL 19GA 1.5"

## (undated) DEVICE — SU VICRYL 0 CT-1 CR 8X18" J740D

## (undated) DEVICE — DRSG TEGADERM 2 1/2X 2 3/4"

## (undated) DEVICE — SU VICRYL 3-0 SH 27" UND J416H

## (undated) DEVICE — IMP SCR MEDT ZEVO 3.5X13MM SD VA 7713513: Type: IMPLANTABLE DEVICE | Site: SPINE CERVICAL | Status: NON-FUNCTIONAL

## (undated) DEVICE — LINEN HALF SHEET 5512

## (undated) DEVICE — SU VICRYL 2-0 CT-1 27" J339H

## (undated) DEVICE — IONM UP TO 7 HOURS

## (undated) DEVICE — GOWN IMPERVIOUS SPECIALTY XL/XLONG 39049

## (undated) DEVICE — DRAPE C-ARMOR 5 SIDED 5523

## (undated) DEVICE — SPONGE SURGIFOAM 50

## (undated) DEVICE — SU VICRYL 1 CT 36" J959H

## (undated) DEVICE — BAG CLEAR TRASH 1.3M 39X33" P4040C

## (undated) DEVICE — CATH TRAY FOLEY SURESTEP 16FR WDRAIN BAG STLK LATEX A300316A

## (undated) DEVICE — LINEN FULL SHEET 5511

## (undated) DEVICE — ESU ELEC BLADE 2.75" COATED/INSULATED E1455

## (undated) DEVICE — SPONGE KITTNER 30-101

## (undated) DEVICE — GLOVE PROTEXIS POWDER FREE 7.5 ORTHOPEDIC 2D73ET75

## (undated) DEVICE — GLOVE PROTEXIS POWDER FREE 6.5 ORTHOPEDIC 2D73ET65

## (undated) DEVICE — GLOVE PROTEXIS MICRO 7.0  2D73PM70

## (undated) DEVICE — CATH URETERAL FLEX TIP TIGERTAIL 06FRX70CM 139006

## (undated) DEVICE — BLADE KNIFE SURG 15 371115

## (undated) DEVICE — PACK TOTAL KNEE SOP15TKFSD

## (undated) DEVICE — SYR 10ML SLIP TIP W/O NDL

## (undated) DEVICE — DRAPE SHEET REV FOLD 3/4 9349

## (undated) DEVICE — GLOVE PROTEXIS BLUE W/NEU-THERA 7.5  2D73EB75

## (undated) DEVICE — TAPE DURAPORE 3" SILK 1538-3

## (undated) DEVICE — SU VICRYL 1 MO-4 18" J702D

## (undated) DEVICE — DRSG STERI STRIP 1/2X4" B1557

## (undated) DEVICE — BONE CLEANING TIP INTERPULSE  0210-010-000

## (undated) DEVICE — SU VICRYL 4-0 PS-2 18" UND J496H

## (undated) DEVICE — PIN PERCUTANEOUS NAVIGATION FOR SPINE O-ARM 100MM 9733235

## (undated) DEVICE — PACK CYSTO CUSTOM RIDGES

## (undated) DEVICE — DRAIN CHANNEL ROUND 15FR FLUTED 072188

## (undated) DEVICE — SYR 03ML LL W/O NDL 309657

## (undated) DEVICE — SYR 10ML FINGER CONTROL W/O NDL 309695

## (undated) DEVICE — PAD CHUX UNDERPAD 23X24" 7136

## (undated) DEVICE — GLOVE PROTEXIS POWDER FREE 7.0 ORTHOPEDIC 2D73ET70

## (undated) DEVICE — SUCTION CANISTER MEDIVAC LINER 3000ML W/LID 65651-530

## (undated) DEVICE — DRSG AQUACEL AG 3.5X9.75" HYDROFIBER 412011

## (undated) DEVICE — GLOVE PROTEXIS W/NEU-THERA 8.5  2D73TE85

## (undated) DEVICE — SU MONOCRYL 4-0 PS-2 18" UND Y496G

## (undated) DEVICE — WRAP EZY KNEE

## (undated) DEVICE — GOWN XXLG REINFORCED 9071EL

## (undated) DEVICE — SPONGE RAY-TEC 4X8" 7318

## (undated) DEVICE — BASKET NITINOL TIPLESS HALO  1.5FRX120CM 554120

## (undated) RX ORDER — FENTANYL CITRATE 50 UG/ML
INJECTION, SOLUTION INTRAMUSCULAR; INTRAVENOUS
Status: DISPENSED
Start: 2019-08-06

## (undated) RX ORDER — FENTANYL CITRATE 50 UG/ML
INJECTION, SOLUTION INTRAMUSCULAR; INTRAVENOUS
Status: DISPENSED
Start: 2022-11-01

## (undated) RX ORDER — PROPOFOL 10 MG/ML
INJECTION, EMULSION INTRAVENOUS
Status: DISPENSED
Start: 2019-08-06

## (undated) RX ORDER — PROPOFOL 10 MG/ML
INJECTION, EMULSION INTRAVENOUS
Status: DISPENSED
Start: 2017-10-16

## (undated) RX ORDER — CEFAZOLIN SODIUM 1 G/3ML
INJECTION, POWDER, FOR SOLUTION INTRAMUSCULAR; INTRAVENOUS
Status: DISPENSED
Start: 2019-08-06

## (undated) RX ORDER — ONDANSETRON 2 MG/ML
INJECTION INTRAMUSCULAR; INTRAVENOUS
Status: DISPENSED
Start: 2022-11-01

## (undated) RX ORDER — LIDOCAINE HYDROCHLORIDE 10 MG/ML
INJECTION, SOLUTION EPIDURAL; INFILTRATION; INTRACAUDAL; PERINEURAL
Status: DISPENSED
Start: 2022-11-01

## (undated) RX ORDER — FENTANYL CITRATE-0.9 % NACL/PF 10 MCG/ML
PLASTIC BAG, INJECTION (ML) INTRAVENOUS
Status: DISPENSED
Start: 2022-11-01

## (undated) RX ORDER — DEXAMETHASONE SODIUM PHOSPHATE 4 MG/ML
INJECTION, SOLUTION INTRA-ARTICULAR; INTRALESIONAL; INTRAMUSCULAR; INTRAVENOUS; SOFT TISSUE
Status: DISPENSED
Start: 2022-05-20

## (undated) RX ORDER — EPHEDRINE SULFATE 50 MG/ML
INJECTION, SOLUTION INTRAMUSCULAR; INTRAVENOUS; SUBCUTANEOUS
Status: DISPENSED
Start: 2023-05-22

## (undated) RX ORDER — CLINDAMYCIN PHOSPHATE 900 MG/50ML
INJECTION, SOLUTION INTRAVENOUS
Status: DISPENSED
Start: 2022-11-01

## (undated) RX ORDER — CLINDAMYCIN PHOSPHATE 900 MG/50ML
INJECTION, SOLUTION INTRAVENOUS
Status: DISPENSED
Start: 2023-05-22

## (undated) RX ORDER — GLYCOPYRROLATE 0.2 MG/ML
INJECTION, SOLUTION INTRAMUSCULAR; INTRAVENOUS
Status: DISPENSED
Start: 2019-08-06

## (undated) RX ORDER — NEOSTIGMINE METHYLSULFATE 1 MG/ML
VIAL (ML) INJECTION
Status: DISPENSED
Start: 2022-05-20

## (undated) RX ORDER — GABAPENTIN 100 MG/1
CAPSULE ORAL
Status: DISPENSED
Start: 2023-05-22

## (undated) RX ORDER — KETOROLAC TROMETHAMINE 15 MG/ML
INJECTION, SOLUTION INTRAMUSCULAR; INTRAVENOUS
Status: DISPENSED
Start: 2022-11-01

## (undated) RX ORDER — PROPOFOL 10 MG/ML
INJECTION, EMULSION INTRAVENOUS
Status: DISPENSED
Start: 2022-10-11

## (undated) RX ORDER — FENTANYL CITRATE 50 UG/ML
INJECTION, SOLUTION INTRAMUSCULAR; INTRAVENOUS
Status: DISPENSED
Start: 2022-05-20

## (undated) RX ORDER — PROPOFOL 10 MG/ML
INJECTION, EMULSION INTRAVENOUS
Status: DISPENSED
Start: 2022-11-01

## (undated) RX ORDER — VANCOMYCIN HYDROCHLORIDE 1 G/20ML
INJECTION, POWDER, LYOPHILIZED, FOR SOLUTION INTRAVENOUS
Status: DISPENSED
Start: 2022-05-20

## (undated) RX ORDER — ACETAMINOPHEN 325 MG/1
TABLET ORAL
Status: DISPENSED
Start: 2022-05-20

## (undated) RX ORDER — CEFAZOLIN SODIUM/WATER 2 G/20 ML
SYRINGE (ML) INTRAVENOUS
Status: DISPENSED
Start: 2022-05-20

## (undated) RX ORDER — GLYCOPYRROLATE 0.2 MG/ML
INJECTION, SOLUTION INTRAMUSCULAR; INTRAVENOUS
Status: DISPENSED
Start: 2019-06-03

## (undated) RX ORDER — HYDROMORPHONE HYDROCHLORIDE 1 MG/ML
INJECTION, SOLUTION INTRAMUSCULAR; INTRAVENOUS; SUBCUTANEOUS
Status: DISPENSED
Start: 2023-05-22

## (undated) RX ORDER — PROPOFOL 10 MG/ML
INJECTION, EMULSION INTRAVENOUS
Status: DISPENSED
Start: 2019-06-03

## (undated) RX ORDER — HYDROMORPHONE HYDROCHLORIDE 1 MG/ML
INJECTION, SOLUTION INTRAMUSCULAR; INTRAVENOUS; SUBCUTANEOUS
Status: DISPENSED
Start: 2019-08-06

## (undated) RX ORDER — LIDOCAINE HYDROCHLORIDE 20 MG/ML
INJECTION, SOLUTION EPIDURAL; INFILTRATION; INTRACAUDAL; PERINEURAL
Status: DISPENSED
Start: 2019-06-03

## (undated) RX ORDER — ONDANSETRON 2 MG/ML
INJECTION INTRAMUSCULAR; INTRAVENOUS
Status: DISPENSED
Start: 2019-06-03

## (undated) RX ORDER — GABAPENTIN 100 MG/1
CAPSULE ORAL
Status: DISPENSED
Start: 2019-08-06

## (undated) RX ORDER — VECURONIUM BROMIDE 1 MG/ML
INJECTION, POWDER, LYOPHILIZED, FOR SOLUTION INTRAVENOUS
Status: DISPENSED
Start: 2019-08-06

## (undated) RX ORDER — ONDANSETRON 2 MG/ML
INJECTION INTRAMUSCULAR; INTRAVENOUS
Status: DISPENSED
Start: 2022-05-20

## (undated) RX ORDER — REMIFENTANIL HYDROCHLORIDE 1 MG/ML
INJECTION, POWDER, LYOPHILIZED, FOR SOLUTION INTRAVENOUS
Status: DISPENSED
Start: 2023-05-22

## (undated) RX ORDER — FENTANYL CITRATE 50 UG/ML
INJECTION, SOLUTION INTRAMUSCULAR; INTRAVENOUS
Status: DISPENSED
Start: 2017-10-16

## (undated) RX ORDER — FENTANYL CITRATE 50 UG/ML
INJECTION, SOLUTION INTRAMUSCULAR; INTRAVENOUS
Status: DISPENSED
Start: 2022-10-11

## (undated) RX ORDER — PROPOFOL 10 MG/ML
INJECTION, EMULSION INTRAVENOUS
Status: DISPENSED
Start: 2022-05-20

## (undated) RX ORDER — LIDOCAINE HYDROCHLORIDE 20 MG/ML
INJECTION, SOLUTION EPIDURAL; INFILTRATION; INTRACAUDAL; PERINEURAL
Status: DISPENSED
Start: 2019-08-06

## (undated) RX ORDER — VECURONIUM BROMIDE 1 MG/ML
INJECTION, POWDER, LYOPHILIZED, FOR SOLUTION INTRAVENOUS
Status: DISPENSED
Start: 2019-06-03

## (undated) RX ORDER — SCOLOPAMINE TRANSDERMAL SYSTEM 1 MG/1
PATCH, EXTENDED RELEASE TRANSDERMAL
Status: DISPENSED
Start: 2022-11-01

## (undated) RX ORDER — HYDROMORPHONE HYDROCHLORIDE 1 MG/ML
INJECTION, SOLUTION INTRAMUSCULAR; INTRAVENOUS; SUBCUTANEOUS
Status: DISPENSED
Start: 2017-10-16

## (undated) RX ORDER — NEOSTIGMINE METHYLSULFATE 1 MG/ML
VIAL (ML) INJECTION
Status: DISPENSED
Start: 2019-08-06

## (undated) RX ORDER — GLYCOPYRROLATE 0.2 MG/ML
INJECTION, SOLUTION INTRAMUSCULAR; INTRAVENOUS
Status: DISPENSED
Start: 2022-05-20

## (undated) RX ORDER — CEFAZOLIN SODIUM 2 G/100ML
INJECTION, SOLUTION INTRAVENOUS
Status: DISPENSED
Start: 2019-06-03

## (undated) RX ORDER — ONDANSETRON 2 MG/ML
INJECTION INTRAMUSCULAR; INTRAVENOUS
Status: DISPENSED
Start: 2022-10-11

## (undated) RX ORDER — HYDROMORPHONE HYDROCHLORIDE 1 MG/ML
INJECTION, SOLUTION INTRAMUSCULAR; INTRAVENOUS; SUBCUTANEOUS
Status: DISPENSED
Start: 2022-05-20

## (undated) RX ORDER — EPINEPHRINE 1 MG/ML
INJECTION, SOLUTION INTRAMUSCULAR; SUBCUTANEOUS
Status: DISPENSED
Start: 2017-10-16

## (undated) RX ORDER — PROPOFOL 10 MG/ML
INJECTION, EMULSION INTRAVENOUS
Status: DISPENSED
Start: 2023-05-22

## (undated) RX ORDER — ALBUMIN, HUMAN INJ 5% 5 %
SOLUTION INTRAVENOUS
Status: DISPENSED
Start: 2019-08-06

## (undated) RX ORDER — BETAMETHASONE SODIUM PHOSPHATE AND BETAMETHASONE ACETATE 3; 3 MG/ML; MG/ML
INJECTION, SUSPENSION INTRA-ARTICULAR; INTRALESIONAL; INTRAMUSCULAR; SOFT TISSUE
Status: DISPENSED
Start: 2017-10-16

## (undated) RX ORDER — ACETAMINOPHEN 325 MG/1
TABLET ORAL
Status: DISPENSED
Start: 2019-08-06

## (undated) RX ORDER — FENTANYL CITRATE 50 UG/ML
INJECTION, SOLUTION INTRAMUSCULAR; INTRAVENOUS
Status: DISPENSED
Start: 2019-06-03

## (undated) RX ORDER — CLINDAMYCIN PHOSPHATE 900 MG/50ML
INJECTION, SOLUTION INTRAVENOUS
Status: DISPENSED
Start: 2017-10-16

## (undated) RX ORDER — ACETAMINOPHEN 325 MG/1
TABLET ORAL
Status: DISPENSED
Start: 2019-06-03

## (undated) RX ORDER — DEXAMETHASONE SODIUM PHOSPHATE 4 MG/ML
INJECTION, SOLUTION INTRA-ARTICULAR; INTRALESIONAL; INTRAMUSCULAR; INTRAVENOUS; SOFT TISSUE
Status: DISPENSED
Start: 2019-06-03

## (undated) RX ORDER — LIDOCAINE HYDROCHLORIDE 10 MG/ML
INJECTION, SOLUTION EPIDURAL; INFILTRATION; INTRACAUDAL; PERINEURAL
Status: DISPENSED
Start: 2017-10-16

## (undated) RX ORDER — BUPIVACAINE HYDROCHLORIDE AND EPINEPHRINE 2.5; 5 MG/ML; UG/ML
INJECTION, SOLUTION EPIDURAL; INFILTRATION; INTRACAUDAL; PERINEURAL
Status: DISPENSED
Start: 2019-08-06

## (undated) RX ORDER — CEFAZOLIN SODIUM 2 G/100ML
INJECTION, SOLUTION INTRAVENOUS
Status: DISPENSED
Start: 2017-10-16

## (undated) RX ORDER — ACETAMINOPHEN 325 MG/1
TABLET ORAL
Status: DISPENSED
Start: 2022-11-01

## (undated) RX ORDER — BUPIVACAINE HYDROCHLORIDE AND EPINEPHRINE 2.5; 5 MG/ML; UG/ML
INJECTION, SOLUTION EPIDURAL; INFILTRATION; INTRACAUDAL; PERINEURAL
Status: DISPENSED
Start: 2019-06-03

## (undated) RX ORDER — LIDOCAINE HYDROCHLORIDE 20 MG/ML
INJECTION, SOLUTION EPIDURAL; INFILTRATION; INTRACAUDAL; PERINEURAL
Status: DISPENSED
Start: 2022-05-20

## (undated) RX ORDER — FENTANYL CITRATE 50 UG/ML
INJECTION, SOLUTION INTRAMUSCULAR; INTRAVENOUS
Status: DISPENSED
Start: 2023-05-22

## (undated) RX ORDER — DEXAMETHASONE SODIUM PHOSPHATE 4 MG/ML
INJECTION, SOLUTION INTRA-ARTICULAR; INTRALESIONAL; INTRAMUSCULAR; INTRAVENOUS; SOFT TISSUE
Status: DISPENSED
Start: 2022-11-01

## (undated) RX ORDER — CEFAZOLIN SODIUM 2 G/100ML
INJECTION, SOLUTION INTRAVENOUS
Status: DISPENSED
Start: 2019-08-06

## (undated) RX ORDER — BUPIVACAINE HYDROCHLORIDE 5 MG/ML
INJECTION, SOLUTION EPIDURAL; INTRACAUDAL
Status: DISPENSED
Start: 2017-10-16

## (undated) RX ORDER — BUPIVACAINE HYDROCHLORIDE AND EPINEPHRINE 2.5; 5 MG/ML; UG/ML
INJECTION, SOLUTION EPIDURAL; INFILTRATION; INTRACAUDAL; PERINEURAL
Status: DISPENSED
Start: 2023-05-22

## (undated) RX ORDER — ONDANSETRON 2 MG/ML
INJECTION INTRAMUSCULAR; INTRAVENOUS
Status: DISPENSED
Start: 2019-08-06

## (undated) RX ORDER — TRANEXAMIC ACID 650 MG/1
TABLET ORAL
Status: DISPENSED
Start: 2022-05-20

## (undated) RX ORDER — NEOSTIGMINE METHYLSULFATE 1 MG/ML
VIAL (ML) INJECTION
Status: DISPENSED
Start: 2019-06-03